# Patient Record
Sex: FEMALE | Race: WHITE | HISPANIC OR LATINO | Employment: OTHER | URBAN - METROPOLITAN AREA
[De-identification: names, ages, dates, MRNs, and addresses within clinical notes are randomized per-mention and may not be internally consistent; named-entity substitution may affect disease eponyms.]

---

## 2017-03-03 ENCOUNTER — HOSPITAL ENCOUNTER (OUTPATIENT)
Facility: MEDICAL CENTER | Age: 77
End: 2017-03-03
Attending: FAMILY MEDICINE
Payer: MEDICARE

## 2017-03-03 PROCEDURE — 81001 URINALYSIS AUTO W/SCOPE: CPT

## 2017-03-03 PROCEDURE — 87086 URINE CULTURE/COLONY COUNT: CPT

## 2017-03-04 LAB
APPEARANCE UR: CLEAR
BILIRUB UR QL STRIP.AUTO: NEGATIVE
COLOR UR AUTO: YELLOW
GLUCOSE UR STRIP.AUTO-MCNC: NEGATIVE MG/DL
KETONES UR STRIP.AUTO-MCNC: NEGATIVE MG/DL
LEUKOCYTE ESTERASE UR QL STRIP.AUTO: NEGATIVE
MICRO URNS: ABNORMAL
NITRITE UR QL STRIP.AUTO: NEGATIVE
PH UR: 6.5 [PH]
PROT UR QL STRIP: NEGATIVE MG/DL
RBC #/AREA URNS HPF: NORMAL /HPF
RBC UR QL AUTO: ABNORMAL
SP GR UR STRIP.AUTO: 1.01

## 2017-03-04 PROCEDURE — 81001 URINALYSIS AUTO W/SCOPE: CPT

## 2017-03-04 PROCEDURE — 87086 URINE CULTURE/COLONY COUNT: CPT

## 2017-03-06 LAB
BACTERIA UR CULT: NORMAL
SIGNIFICANT IND 70042: NORMAL
SOURCE SOURCE: NORMAL

## 2017-03-21 ENCOUNTER — HOSPITAL ENCOUNTER (OUTPATIENT)
Dept: LAB | Facility: MEDICAL CENTER | Age: 77
End: 2017-03-21
Attending: FAMILY MEDICINE
Payer: MEDICARE

## 2017-03-21 LAB
ANION GAP SERPL CALC-SCNC: 6 MMOL/L (ref 0–11.9)
BUN SERPL-MCNC: 19 MG/DL (ref 8–22)
CALCIUM SERPL-MCNC: 9.6 MG/DL (ref 8.5–10.5)
CHLORIDE SERPL-SCNC: 104 MMOL/L (ref 96–112)
CO2 SERPL-SCNC: 26 MMOL/L (ref 20–33)
CREAT SERPL-MCNC: 0.81 MG/DL (ref 0.5–1.4)
GLUCOSE SERPL-MCNC: 94 MG/DL (ref 65–99)
POTASSIUM SERPL-SCNC: 4.3 MMOL/L (ref 3.6–5.5)
SODIUM SERPL-SCNC: 136 MMOL/L (ref 135–145)

## 2017-03-21 PROCEDURE — 36415 COLL VENOUS BLD VENIPUNCTURE: CPT

## 2017-03-21 PROCEDURE — 80048 BASIC METABOLIC PNL TOTAL CA: CPT

## 2017-04-06 ENCOUNTER — HOSPITAL ENCOUNTER (OUTPATIENT)
Dept: RADIOLOGY | Facility: MEDICAL CENTER | Age: 77
End: 2017-04-06
Attending: FAMILY MEDICINE
Payer: MEDICARE

## 2017-04-06 DIAGNOSIS — G45.9 TRANSIENT CEREBRAL ISCHEMIA, UNSPECIFIED TYPE: ICD-10-CM

## 2017-04-06 PROCEDURE — 700117 HCHG RX CONTRAST REV CODE 255: Performed by: FAMILY MEDICINE

## 2017-04-06 PROCEDURE — A9579 GAD-BASE MR CONTRAST NOS,1ML: HCPCS | Performed by: FAMILY MEDICINE

## 2017-04-06 PROCEDURE — 70553 MRI BRAIN STEM W/O & W/DYE: CPT

## 2017-04-06 RX ADMIN — GADODIAMIDE 15 ML: 287 INJECTION INTRAVENOUS at 15:58

## 2017-10-17 ENCOUNTER — HOSPITAL ENCOUNTER (OUTPATIENT)
Dept: LAB | Facility: MEDICAL CENTER | Age: 77
End: 2017-10-17
Attending: FAMILY MEDICINE
Payer: MEDICARE

## 2017-10-17 LAB
ALBUMIN SERPL BCP-MCNC: 3.8 G/DL (ref 3.2–4.9)
ALBUMIN/GLOB SERPL: 1.1 G/DL
ALP SERPL-CCNC: 90 U/L (ref 30–99)
ALT SERPL-CCNC: 13 U/L (ref 2–50)
ANION GAP SERPL CALC-SCNC: 9 MMOL/L (ref 0–11.9)
AST SERPL-CCNC: 18 U/L (ref 12–45)
BASOPHILS # BLD AUTO: 1.5 % (ref 0–1.8)
BASOPHILS # BLD: 0.13 K/UL (ref 0–0.12)
BILIRUB SERPL-MCNC: 0.2 MG/DL (ref 0.1–1.5)
BUN SERPL-MCNC: 19 MG/DL (ref 8–22)
CALCIUM SERPL-MCNC: 9.5 MG/DL (ref 8.5–10.5)
CHLORIDE SERPL-SCNC: 107 MMOL/L (ref 96–112)
CO2 SERPL-SCNC: 23 MMOL/L (ref 20–33)
CREAT SERPL-MCNC: 0.76 MG/DL (ref 0.5–1.4)
CRP SERPL HS-MCNC: 12.1 MG/L (ref 0–7.5)
EOSINOPHIL # BLD AUTO: 0.15 K/UL (ref 0–0.51)
EOSINOPHIL NFR BLD: 1.7 % (ref 0–6.9)
ERYTHROCYTE [DISTWIDTH] IN BLOOD BY AUTOMATED COUNT: 42.5 FL (ref 35.9–50)
ERYTHROCYTE [SEDIMENTATION RATE] IN BLOOD BY WESTERGREN METHOD: 43 MM/HOUR (ref 0–30)
GFR SERPL CREATININE-BSD FRML MDRD: >60 ML/MIN/1.73 M 2
GLOBULIN SER CALC-MCNC: 3.5 G/DL (ref 1.9–3.5)
GLUCOSE SERPL-MCNC: 105 MG/DL (ref 65–99)
HCT VFR BLD AUTO: 38 % (ref 37–47)
HGB BLD-MCNC: 12.3 G/DL (ref 12–16)
IMM GRANULOCYTES # BLD AUTO: 0.02 K/UL (ref 0–0.11)
IMM GRANULOCYTES NFR BLD AUTO: 0.2 % (ref 0–0.9)
LYMPHOCYTES # BLD AUTO: 2.59 K/UL (ref 1–4.8)
LYMPHOCYTES NFR BLD: 29.2 % (ref 22–41)
MCH RBC QN AUTO: 27.5 PG (ref 27–33)
MCHC RBC AUTO-ENTMCNC: 32.4 G/DL (ref 33.6–35)
MCV RBC AUTO: 85 FL (ref 81.4–97.8)
MONOCYTES # BLD AUTO: 0.35 K/UL (ref 0–0.85)
MONOCYTES NFR BLD AUTO: 4 % (ref 0–13.4)
NEUTROPHILS # BLD AUTO: 5.62 K/UL (ref 2–7.15)
NEUTROPHILS NFR BLD: 63.4 % (ref 44–72)
NRBC # BLD AUTO: 0.02 K/UL
NRBC BLD AUTO-RTO: 0.2 /100 WBC
PLATELET # BLD AUTO: 144 K/UL (ref 164–446)
PMV BLD AUTO: 12 FL (ref 9–12.9)
POTASSIUM SERPL-SCNC: 4.3 MMOL/L (ref 3.6–5.5)
PROT SERPL-MCNC: 7.3 G/DL (ref 6–8.2)
RBC # BLD AUTO: 4.47 M/UL (ref 4.2–5.4)
RHEUMATOID FACT SER IA-ACNC: <10 IU/ML (ref 0–14)
SODIUM SERPL-SCNC: 139 MMOL/L (ref 135–145)
WBC # BLD AUTO: 8.9 K/UL (ref 4.8–10.8)

## 2017-10-17 PROCEDURE — 86038 ANTINUCLEAR ANTIBODIES: CPT

## 2017-10-17 PROCEDURE — 86431 RHEUMATOID FACTOR QUANT: CPT

## 2017-10-17 PROCEDURE — 85652 RBC SED RATE AUTOMATED: CPT

## 2017-10-17 PROCEDURE — 85025 COMPLETE CBC W/AUTO DIFF WBC: CPT

## 2017-10-17 PROCEDURE — 86200 CCP ANTIBODY: CPT

## 2017-10-17 PROCEDURE — 86141 C-REACTIVE PROTEIN HS: CPT

## 2017-10-17 PROCEDURE — 36415 COLL VENOUS BLD VENIPUNCTURE: CPT

## 2017-10-17 PROCEDURE — 80053 COMPREHEN METABOLIC PANEL: CPT

## 2017-10-19 LAB
CCP IGG SERPL-ACNC: 42 UNITS (ref 0–19)
NUCLEAR IGG SER QL IA: NORMAL

## 2017-11-16 ENCOUNTER — HOSPITAL ENCOUNTER (OUTPATIENT)
Dept: RADIOLOGY | Facility: MEDICAL CENTER | Age: 77
End: 2017-11-16
Attending: FAMILY MEDICINE
Payer: MEDICARE

## 2017-11-16 DIAGNOSIS — Z12.31 VISIT FOR SCREENING MAMMOGRAM: ICD-10-CM

## 2017-11-16 PROCEDURE — G0202 SCR MAMMO BI INCL CAD: HCPCS

## 2018-06-12 ENCOUNTER — HOSPITAL ENCOUNTER (OUTPATIENT)
Dept: LAB | Facility: MEDICAL CENTER | Age: 78
End: 2018-06-12
Attending: FAMILY MEDICINE
Payer: MEDICARE

## 2018-06-12 LAB
25(OH)D3 SERPL-MCNC: 85 NG/ML (ref 30–100)
ALBUMIN SERPL BCP-MCNC: 4.1 G/DL (ref 3.2–4.9)
ALBUMIN/GLOB SERPL: 1.1 G/DL
ALP SERPL-CCNC: 97 U/L (ref 30–99)
ALT SERPL-CCNC: 21 U/L (ref 2–50)
ANION GAP SERPL CALC-SCNC: 8 MMOL/L (ref 0–11.9)
AST SERPL-CCNC: 21 U/L (ref 12–45)
BASOPHILS # BLD AUTO: 1.1 % (ref 0–1.8)
BASOPHILS # BLD: 0.07 K/UL (ref 0–0.12)
BILIRUB SERPL-MCNC: 0.5 MG/DL (ref 0.1–1.5)
BUN SERPL-MCNC: 21 MG/DL (ref 8–22)
CALCIUM SERPL-MCNC: 9.4 MG/DL (ref 8.5–10.5)
CHLORIDE SERPL-SCNC: 106 MMOL/L (ref 96–112)
CHOLEST SERPL-MCNC: 144 MG/DL (ref 100–199)
CO2 SERPL-SCNC: 24 MMOL/L (ref 20–33)
CREAT SERPL-MCNC: 1.06 MG/DL (ref 0.5–1.4)
EOSINOPHIL # BLD AUTO: 0.13 K/UL (ref 0–0.51)
EOSINOPHIL NFR BLD: 2 % (ref 0–6.9)
ERYTHROCYTE [DISTWIDTH] IN BLOOD BY AUTOMATED COUNT: 44.6 FL (ref 35.9–50)
GLOBULIN SER CALC-MCNC: 3.6 G/DL (ref 1.9–3.5)
GLUCOSE SERPL-MCNC: 119 MG/DL (ref 65–99)
HCT VFR BLD AUTO: 43.4 % (ref 37–47)
HDLC SERPL-MCNC: 51 MG/DL
HGB BLD-MCNC: 13.4 G/DL (ref 12–16)
IMM GRANULOCYTES # BLD AUTO: 0.01 K/UL (ref 0–0.11)
IMM GRANULOCYTES NFR BLD AUTO: 0.2 % (ref 0–0.9)
LDLC SERPL CALC-MCNC: 70 MG/DL
LYMPHOCYTES # BLD AUTO: 1.58 K/UL (ref 1–4.8)
LYMPHOCYTES NFR BLD: 24.1 % (ref 22–41)
MCH RBC QN AUTO: 27.3 PG (ref 27–33)
MCHC RBC AUTO-ENTMCNC: 30.9 G/DL (ref 33.6–35)
MCV RBC AUTO: 88.4 FL (ref 81.4–97.8)
MONOCYTES # BLD AUTO: 0.27 K/UL (ref 0–0.85)
MONOCYTES NFR BLD AUTO: 4.1 % (ref 0–13.4)
NEUTROPHILS # BLD AUTO: 4.5 K/UL (ref 2–7.15)
NEUTROPHILS NFR BLD: 68.5 % (ref 44–72)
NRBC # BLD AUTO: 0 K/UL
NRBC BLD-RTO: 0 /100 WBC
PLATELET # BLD AUTO: 126 K/UL (ref 164–446)
PMV BLD AUTO: 11.9 FL (ref 9–12.9)
POTASSIUM SERPL-SCNC: 4.1 MMOL/L (ref 3.6–5.5)
PROT SERPL-MCNC: 7.7 G/DL (ref 6–8.2)
RBC # BLD AUTO: 4.91 M/UL (ref 4.2–5.4)
SODIUM SERPL-SCNC: 138 MMOL/L (ref 135–145)
TRIGL SERPL-MCNC: 113 MG/DL (ref 0–149)
TSH SERPL DL<=0.005 MIU/L-ACNC: 2.61 UIU/ML (ref 0.38–5.33)
WBC # BLD AUTO: 6.6 K/UL (ref 4.8–10.8)

## 2018-06-12 PROCEDURE — 82306 VITAMIN D 25 HYDROXY: CPT

## 2018-06-12 PROCEDURE — 84443 ASSAY THYROID STIM HORMONE: CPT

## 2018-06-12 PROCEDURE — 36415 COLL VENOUS BLD VENIPUNCTURE: CPT

## 2018-06-12 PROCEDURE — 85025 COMPLETE CBC W/AUTO DIFF WBC: CPT

## 2018-06-12 PROCEDURE — 80061 LIPID PANEL: CPT

## 2018-06-12 PROCEDURE — 80053 COMPREHEN METABOLIC PANEL: CPT

## 2019-03-28 ENCOUNTER — APPOINTMENT (OUTPATIENT)
Dept: RADIOLOGY | Facility: MEDICAL CENTER | Age: 79
DRG: 388 | End: 2019-03-28
Attending: HOSPITALIST
Payer: MEDICARE

## 2019-03-28 ENCOUNTER — APPOINTMENT (OUTPATIENT)
Dept: RADIOLOGY | Facility: MEDICAL CENTER | Age: 79
DRG: 388 | End: 2019-03-28
Attending: EMERGENCY MEDICINE
Payer: MEDICARE

## 2019-03-28 ENCOUNTER — HOSPITAL ENCOUNTER (INPATIENT)
Facility: MEDICAL CENTER | Age: 79
LOS: 4 days | DRG: 388 | End: 2019-04-01
Attending: EMERGENCY MEDICINE | Admitting: HOSPITALIST
Payer: MEDICARE

## 2019-03-28 DIAGNOSIS — R54 AGE-RELATED PHYSICAL DEBILITY: ICD-10-CM

## 2019-03-28 DIAGNOSIS — E87.20 LACTIC ACIDOSIS: ICD-10-CM

## 2019-03-28 DIAGNOSIS — R07.9 ACUTE CHEST PAIN: ICD-10-CM

## 2019-03-28 DIAGNOSIS — D72.829 LEUKOCYTOSIS, UNSPECIFIED TYPE: ICD-10-CM

## 2019-03-28 DIAGNOSIS — E86.0 DEHYDRATION: ICD-10-CM

## 2019-03-28 DIAGNOSIS — K56.7 ILEUS (HCC): ICD-10-CM

## 2019-03-28 DIAGNOSIS — R10.9 ACUTE ABDOMINAL PAIN: ICD-10-CM

## 2019-03-28 DIAGNOSIS — I87.8 POOR VENOUS ACCESS: ICD-10-CM

## 2019-03-28 DIAGNOSIS — Z86.73 HISTORY OF STROKE: ICD-10-CM

## 2019-03-28 DIAGNOSIS — K85.90 ACUTE PANCREATITIS, UNSPECIFIED COMPLICATION STATUS, UNSPECIFIED PANCREATITIS TYPE: ICD-10-CM

## 2019-03-28 DIAGNOSIS — I10 ESSENTIAL HYPERTENSION: ICD-10-CM

## 2019-03-28 DIAGNOSIS — F41.9 ANXIETY: ICD-10-CM

## 2019-03-28 DIAGNOSIS — M79.7 FIBROMYALGIA: ICD-10-CM

## 2019-03-28 PROBLEM — R65.10 SIRS (SYSTEMIC INFLAMMATORY RESPONSE SYNDROME) (HCC): Status: ACTIVE | Noted: 2019-03-28

## 2019-03-28 PROBLEM — R79.9 ELEVATED BUN: Status: ACTIVE | Noted: 2019-03-28

## 2019-03-28 PROBLEM — K56.609 SMALL BOWEL OBSTRUCTION (HCC): Status: ACTIVE | Noted: 2019-03-28

## 2019-03-28 LAB
ALBUMIN SERPL BCP-MCNC: 4.8 G/DL (ref 3.2–4.9)
ALBUMIN/GLOB SERPL: 1.3 G/DL
ALP SERPL-CCNC: 105 U/L (ref 30–99)
ALT SERPL-CCNC: 26 U/L (ref 2–50)
ANION GAP SERPL CALC-SCNC: 11 MMOL/L (ref 0–11.9)
APTT PPP: 21.3 SEC (ref 24.7–36)
AST SERPL-CCNC: 37 U/L (ref 12–45)
BASOPHILS # BLD AUTO: 0.3 % (ref 0–1.8)
BASOPHILS # BLD: 0.07 K/UL (ref 0–0.12)
BILIRUB SERPL-MCNC: 0.3 MG/DL (ref 0.1–1.5)
BNP SERPL-MCNC: 11 PG/ML (ref 0–100)
BUN SERPL-MCNC: 24 MG/DL (ref 8–22)
CALCIUM SERPL-MCNC: 9.3 MG/DL (ref 8.5–10.5)
CHLORIDE SERPL-SCNC: 106 MMOL/L (ref 96–112)
CHOLEST SERPL-MCNC: 133 MG/DL (ref 100–199)
CO2 SERPL-SCNC: 24 MMOL/L (ref 20–33)
CREAT SERPL-MCNC: 1.15 MG/DL (ref 0.5–1.4)
EKG IMPRESSION: NORMAL
EOSINOPHIL # BLD AUTO: 0.02 K/UL (ref 0–0.51)
EOSINOPHIL NFR BLD: 0.1 % (ref 0–6.9)
ERYTHROCYTE [DISTWIDTH] IN BLOOD BY AUTOMATED COUNT: 46.2 FL (ref 35.9–50)
GLOBULIN SER CALC-MCNC: 3.6 G/DL (ref 1.9–3.5)
GLUCOSE SERPL-MCNC: 182 MG/DL (ref 65–99)
HCG SERPL QL: NEGATIVE
HCT VFR BLD AUTO: 47.7 % (ref 37–47)
HDLC SERPL-MCNC: 48 MG/DL
HGB BLD-MCNC: 15.3 G/DL (ref 12–16)
IMM GRANULOCYTES # BLD AUTO: 0.17 K/UL (ref 0–0.11)
IMM GRANULOCYTES NFR BLD AUTO: 0.8 % (ref 0–0.9)
INR PPP: 0.97 (ref 0.87–1.13)
LACTATE BLD-SCNC: 1.9 MMOL/L (ref 0.5–2)
LACTATE BLD-SCNC: 3.1 MMOL/L (ref 0.5–2)
LACTATE BLD-SCNC: 3.3 MMOL/L (ref 0.5–2)
LDLC SERPL CALC-MCNC: 67 MG/DL
LIPASE SERPL-CCNC: 4447 U/L (ref 11–82)
LYMPHOCYTES # BLD AUTO: 1.78 K/UL (ref 1–4.8)
LYMPHOCYTES NFR BLD: 8.8 % (ref 22–41)
MCH RBC QN AUTO: 29.1 PG (ref 27–33)
MCHC RBC AUTO-ENTMCNC: 32.1 G/DL (ref 33.6–35)
MCV RBC AUTO: 90.7 FL (ref 81.4–97.8)
MONOCYTES # BLD AUTO: 0.85 K/UL (ref 0–0.85)
MONOCYTES NFR BLD AUTO: 4.2 % (ref 0–13.4)
NEUTROPHILS # BLD AUTO: 17.23 K/UL (ref 2–7.15)
NEUTROPHILS NFR BLD: 85.8 % (ref 44–72)
NRBC # BLD AUTO: 0 K/UL
NRBC BLD-RTO: 0 /100 WBC
PLATELET # BLD AUTO: 209 K/UL (ref 164–446)
PMV BLD AUTO: 11 FL (ref 9–12.9)
POTASSIUM SERPL-SCNC: 4.1 MMOL/L (ref 3.6–5.5)
PROT SERPL-MCNC: 8.4 G/DL (ref 6–8.2)
PROTHROMBIN TIME: 13 SEC (ref 12–14.6)
RBC # BLD AUTO: 5.26 M/UL (ref 4.2–5.4)
SODIUM SERPL-SCNC: 141 MMOL/L (ref 135–145)
TRIGL SERPL-MCNC: 89 MG/DL (ref 0–149)
TROPONIN I SERPL-MCNC: <0.01 NG/ML (ref 0–0.04)
WBC # BLD AUTO: 20.1 K/UL (ref 4.8–10.8)

## 2019-03-28 PROCEDURE — 85025 COMPLETE CBC W/AUTO DIFF WBC: CPT

## 2019-03-28 PROCEDURE — 99223 1ST HOSP IP/OBS HIGH 75: CPT | Performed by: HOSPITALIST

## 2019-03-28 PROCEDURE — 304538 HCHG NG TUBE

## 2019-03-28 PROCEDURE — 80053 COMPREHEN METABOLIC PANEL: CPT

## 2019-03-28 PROCEDURE — 83605 ASSAY OF LACTIC ACID: CPT

## 2019-03-28 PROCEDURE — 770006 HCHG ROOM/CARE - MED/SURG/GYN SEMI*

## 2019-03-28 PROCEDURE — 76705 ECHO EXAM OF ABDOMEN: CPT

## 2019-03-28 PROCEDURE — 700111 HCHG RX REV CODE 636 W/ 250 OVERRIDE (IP): Performed by: EMERGENCY MEDICINE

## 2019-03-28 PROCEDURE — 70450 CT HEAD/BRAIN W/O DYE: CPT

## 2019-03-28 PROCEDURE — 700105 HCHG RX REV CODE 258: Performed by: EMERGENCY MEDICINE

## 2019-03-28 PROCEDURE — 84703 CHORIONIC GONADOTROPIN ASSAY: CPT

## 2019-03-28 PROCEDURE — 96376 TX/PRO/DX INJ SAME DRUG ADON: CPT

## 2019-03-28 PROCEDURE — 36556 INSERT NON-TUNNEL CV CATH: CPT

## 2019-03-28 PROCEDURE — 96368 THER/DIAG CONCURRENT INF: CPT

## 2019-03-28 PROCEDURE — B548ZZA ULTRASONOGRAPHY OF SUPERIOR VENA CAVA, GUIDANCE: ICD-10-PCS | Performed by: EMERGENCY MEDICINE

## 2019-03-28 PROCEDURE — 02HV33Z INSERTION OF INFUSION DEVICE INTO SUPERIOR VENA CAVA, PERCUTANEOUS APPROACH: ICD-10-PCS | Performed by: EMERGENCY MEDICINE

## 2019-03-28 PROCEDURE — 700111 HCHG RX REV CODE 636 W/ 250 OVERRIDE (IP): Performed by: HOSPITALIST

## 2019-03-28 PROCEDURE — 700111 HCHG RX REV CODE 636 W/ 250 OVERRIDE (IP): Performed by: NURSE PRACTITIONER

## 2019-03-28 PROCEDURE — 99285 EMERGENCY DEPT VISIT HI MDM: CPT

## 2019-03-28 PROCEDURE — 96365 THER/PROPH/DIAG IV INF INIT: CPT

## 2019-03-28 PROCEDURE — 71045 X-RAY EXAM CHEST 1 VIEW: CPT

## 2019-03-28 PROCEDURE — 71275 CT ANGIOGRAPHY CHEST: CPT

## 2019-03-28 PROCEDURE — 84484 ASSAY OF TROPONIN QUANT: CPT

## 2019-03-28 PROCEDURE — C1751 CATH, INF, PER/CENT/MIDLINE: HCPCS

## 2019-03-28 PROCEDURE — 700101 HCHG RX REV CODE 250: Performed by: EMERGENCY MEDICINE

## 2019-03-28 PROCEDURE — 93005 ELECTROCARDIOGRAM TRACING: CPT | Performed by: EMERGENCY MEDICINE

## 2019-03-28 PROCEDURE — 83690 ASSAY OF LIPASE: CPT

## 2019-03-28 PROCEDURE — 700105 HCHG RX REV CODE 258: Performed by: HOSPITALIST

## 2019-03-28 PROCEDURE — 83880 ASSAY OF NATRIURETIC PEPTIDE: CPT

## 2019-03-28 PROCEDURE — 96375 TX/PRO/DX INJ NEW DRUG ADDON: CPT

## 2019-03-28 PROCEDURE — 96372 THER/PROPH/DIAG INJ SC/IM: CPT

## 2019-03-28 PROCEDURE — 85730 THROMBOPLASTIN TIME PARTIAL: CPT

## 2019-03-28 PROCEDURE — C9113 INJ PANTOPRAZOLE SODIUM, VIA: HCPCS | Performed by: NURSE PRACTITIONER

## 2019-03-28 PROCEDURE — 87040 BLOOD CULTURE FOR BACTERIA: CPT

## 2019-03-28 PROCEDURE — 85610 PROTHROMBIN TIME: CPT

## 2019-03-28 PROCEDURE — 80061 LIPID PANEL: CPT

## 2019-03-28 PROCEDURE — 700117 HCHG RX CONTRAST REV CODE 255: Performed by: EMERGENCY MEDICINE

## 2019-03-28 RX ORDER — LOTEPREDNOL ETABONATE 5 MG/G
1 GEL OPHTHALMIC 4 TIMES DAILY
COMMUNITY
Start: 2019-03-04 | End: 2019-03-28 | Stop reason: CLARIF

## 2019-03-28 RX ORDER — ACETAMINOPHEN AND CODEINE PHOSPHATE 120; 12 MG/5ML; MG/5ML
30 SOLUTION ORAL
Status: DISCONTINUED | OUTPATIENT
Start: 2019-03-28 | End: 2019-03-28

## 2019-03-28 RX ORDER — PROCHLORPERAZINE MALEATE 10 MG
10 TABLET ORAL EVERY 6 HOURS PRN
Status: DISCONTINUED | OUTPATIENT
Start: 2019-03-28 | End: 2019-04-01 | Stop reason: HOSPADM

## 2019-03-28 RX ORDER — SODIUM CHLORIDE 9 MG/ML
INJECTION, SOLUTION INTRAVENOUS CONTINUOUS
Status: DISCONTINUED | OUTPATIENT
Start: 2019-03-28 | End: 2019-03-28

## 2019-03-28 RX ORDER — ONDANSETRON 4 MG/1
4 TABLET, ORALLY DISINTEGRATING ORAL EVERY 8 HOURS PRN
COMMUNITY
End: 2019-04-02

## 2019-03-28 RX ORDER — PANTOPRAZOLE SODIUM 40 MG/10ML
40 INJECTION, POWDER, LYOPHILIZED, FOR SOLUTION INTRAVENOUS DAILY
Status: DISCONTINUED | OUTPATIENT
Start: 2019-03-28 | End: 2019-03-30

## 2019-03-28 RX ORDER — ONDANSETRON 2 MG/ML
4 INJECTION INTRAMUSCULAR; INTRAVENOUS EVERY 4 HOURS PRN
Status: DISCONTINUED | OUTPATIENT
Start: 2019-03-28 | End: 2019-04-01 | Stop reason: HOSPADM

## 2019-03-28 RX ORDER — HEPARIN SODIUM 5000 [USP'U]/ML
5000 INJECTION, SOLUTION INTRAVENOUS; SUBCUTANEOUS EVERY 8 HOURS
Status: DISCONTINUED | OUTPATIENT
Start: 2019-03-28 | End: 2019-04-01 | Stop reason: HOSPADM

## 2019-03-28 RX ORDER — ONDANSETRON 2 MG/ML
4 INJECTION INTRAMUSCULAR; INTRAVENOUS ONCE
Status: COMPLETED | OUTPATIENT
Start: 2019-03-28 | End: 2019-03-28

## 2019-03-28 RX ORDER — CLONIDINE 0.1 MG/24H
1 PATCH, EXTENDED RELEASE TRANSDERMAL
Status: DISCONTINUED | OUTPATIENT
Start: 2019-03-28 | End: 2019-03-28

## 2019-03-28 RX ORDER — MORPHINE SULFATE 4 MG/ML
4 INJECTION, SOLUTION INTRAMUSCULAR; INTRAVENOUS
Status: DISCONTINUED | OUTPATIENT
Start: 2019-03-28 | End: 2019-03-31

## 2019-03-28 RX ORDER — BUSPIRONE HYDROCHLORIDE 10 MG/1
30 TABLET ORAL 2 TIMES DAILY
Status: DISCONTINUED | OUTPATIENT
Start: 2019-03-28 | End: 2019-03-28

## 2019-03-28 RX ORDER — SODIUM CHLORIDE 9 MG/ML
1000 INJECTION, SOLUTION INTRAVENOUS ONCE
Status: COMPLETED | OUTPATIENT
Start: 2019-03-28 | End: 2019-03-28

## 2019-03-28 RX ORDER — POLYETHYLENE GLYCOL 3350 17 G/17G
1 POWDER, FOR SOLUTION ORAL
Status: DISCONTINUED | OUTPATIENT
Start: 2019-03-28 | End: 2019-04-01 | Stop reason: HOSPADM

## 2019-03-28 RX ORDER — SODIUM CHLORIDE, SODIUM LACTATE, POTASSIUM CHLORIDE, CALCIUM CHLORIDE 600; 310; 30; 20 MG/100ML; MG/100ML; MG/100ML; MG/100ML
INJECTION, SOLUTION INTRAVENOUS CONTINUOUS
Status: DISCONTINUED | OUTPATIENT
Start: 2019-03-28 | End: 2019-03-31

## 2019-03-28 RX ORDER — ONDANSETRON 4 MG/1
4 TABLET, ORALLY DISINTEGRATING ORAL EVERY 4 HOURS PRN
Status: DISCONTINUED | OUTPATIENT
Start: 2019-03-28 | End: 2019-04-01 | Stop reason: HOSPADM

## 2019-03-28 RX ORDER — OMEPRAZOLE 20 MG/1
20 CAPSULE, DELAYED RELEASE ORAL DAILY
COMMUNITY
End: 2019-04-02

## 2019-03-28 RX ORDER — TRIAMCINOLONE ACETONIDE 1 MG/G
1 CREAM TOPICAL 3 TIMES DAILY PRN
COMMUNITY
End: 2019-03-28 | Stop reason: CLARIF

## 2019-03-28 RX ORDER — SODIUM CHLORIDE 9 MG/ML
1000 INJECTION, SOLUTION INTRAVENOUS ONCE
Status: DISPENSED | OUTPATIENT
Start: 2019-03-28 | End: 2019-03-29

## 2019-03-28 RX ORDER — MIDAZOLAM HYDROCHLORIDE 1 MG/ML
INJECTION INTRAMUSCULAR; INTRAVENOUS
Status: DISPENSED
Start: 2019-03-28 | End: 2019-03-28

## 2019-03-28 RX ORDER — GABAPENTIN 100 MG/1
200 CAPSULE ORAL 2 TIMES DAILY
Status: DISCONTINUED | OUTPATIENT
Start: 2019-03-28 | End: 2019-04-01 | Stop reason: HOSPADM

## 2019-03-28 RX ORDER — ENALAPRILAT 1.25 MG/ML
1.25 INJECTION INTRAVENOUS EVERY 6 HOURS PRN
Status: DISCONTINUED | OUTPATIENT
Start: 2019-03-28 | End: 2019-04-01 | Stop reason: HOSPADM

## 2019-03-28 RX ORDER — LORAZEPAM 1 MG/1
0.5 TABLET ORAL EVERY 4 HOURS PRN
Status: DISCONTINUED | OUTPATIENT
Start: 2019-03-28 | End: 2019-03-30

## 2019-03-28 RX ORDER — BISACODYL 10 MG
10 SUPPOSITORY, RECTAL RECTAL
Status: DISCONTINUED | OUTPATIENT
Start: 2019-03-28 | End: 2019-04-01 | Stop reason: HOSPADM

## 2019-03-28 RX ORDER — LOTEPREDNOL ETABONATE 5 MG/G
1 GEL OPHTHALMIC 3 TIMES DAILY
COMMUNITY
End: 2019-04-02

## 2019-03-28 RX ORDER — OXYCODONE HYDROCHLORIDE 10 MG/1
10 TABLET ORAL
Status: DISCONTINUED | OUTPATIENT
Start: 2019-03-28 | End: 2019-03-31

## 2019-03-28 RX ORDER — HYDROXYZINE HYDROCHLORIDE 25 MG/1
25 TABLET, FILM COATED ORAL
Status: ON HOLD | COMMUNITY
End: 2019-04-01

## 2019-03-28 RX ORDER — OXYCODONE HYDROCHLORIDE 5 MG/1
5 TABLET ORAL
Status: DISCONTINUED | OUTPATIENT
Start: 2019-03-28 | End: 2019-03-31

## 2019-03-28 RX ORDER — AMOXICILLIN 250 MG
2 CAPSULE ORAL 2 TIMES DAILY
Status: DISCONTINUED | OUTPATIENT
Start: 2019-03-28 | End: 2019-04-01 | Stop reason: HOSPADM

## 2019-03-28 RX ORDER — BUSPIRONE HYDROCHLORIDE 10 MG/1
15 TABLET ORAL 3 TIMES DAILY
Status: DISCONTINUED | OUTPATIENT
Start: 2019-03-28 | End: 2019-04-01 | Stop reason: HOSPADM

## 2019-03-28 RX ORDER — LABETALOL HYDROCHLORIDE 5 MG/ML
10 INJECTION, SOLUTION INTRAVENOUS EVERY 4 HOURS PRN
Status: DISCONTINUED | OUTPATIENT
Start: 2019-03-28 | End: 2019-04-01 | Stop reason: HOSPADM

## 2019-03-28 RX ORDER — ACETAMINOPHEN AND CODEINE PHOSPHATE 120; 12 MG/5ML; MG/5ML
30 SOLUTION ORAL NIGHTLY PRN
COMMUNITY
End: 2019-04-02

## 2019-03-28 RX ADMIN — ONDANSETRON 4 MG: 2 INJECTION INTRAMUSCULAR; INTRAVENOUS at 17:32

## 2019-03-28 RX ADMIN — MORPHINE SULFATE 4 MG: 4 INJECTION INTRAVENOUS at 09:14

## 2019-03-28 RX ADMIN — ONDANSETRON 4 MG: 2 INJECTION INTRAMUSCULAR; INTRAVENOUS at 09:18

## 2019-03-28 RX ADMIN — SODIUM CHLORIDE 1000 ML: 9 INJECTION, SOLUTION INTRAVENOUS at 05:35

## 2019-03-28 RX ADMIN — ONDANSETRON 4 MG: 2 INJECTION INTRAMUSCULAR; INTRAVENOUS at 05:23

## 2019-03-28 RX ADMIN — ENALAPRILAT 1.25 MG: 1.25 INJECTION INTRAVENOUS at 12:11

## 2019-03-28 RX ADMIN — SODIUM CHLORIDE, POTASSIUM CHLORIDE, SODIUM LACTATE AND CALCIUM CHLORIDE: 600; 310; 30; 20 INJECTION, SOLUTION INTRAVENOUS at 17:23

## 2019-03-28 RX ADMIN — FENTANYL CITRATE 50 MCG: 50 INJECTION INTRAMUSCULAR; INTRAVENOUS at 05:28

## 2019-03-28 RX ADMIN — SODIUM CHLORIDE 1000 ML: 9 INJECTION, SOLUTION INTRAVENOUS at 04:45

## 2019-03-28 RX ADMIN — MORPHINE SULFATE 4 MG: 4 INJECTION INTRAVENOUS at 18:21

## 2019-03-28 RX ADMIN — HEPARIN SODIUM 5000 UNITS: 5000 INJECTION, SOLUTION INTRAVENOUS; SUBCUTANEOUS at 21:47

## 2019-03-28 RX ADMIN — FENTANYL CITRATE 50 MCG: 50 INJECTION INTRAMUSCULAR; INTRAVENOUS at 07:57

## 2019-03-28 RX ADMIN — CEFTRIAXONE SODIUM 2 G: 2 INJECTION, POWDER, FOR SOLUTION INTRAMUSCULAR; INTRAVENOUS at 08:05

## 2019-03-28 RX ADMIN — METRONIDAZOLE 500 MG: 500 INJECTION, SOLUTION INTRAVENOUS at 08:07

## 2019-03-28 RX ADMIN — SODIUM CHLORIDE 150 ML: 9 INJECTION, SOLUTION INTRAVENOUS at 08:09

## 2019-03-28 RX ADMIN — MORPHINE SULFATE 4 MG: 4 INJECTION INTRAVENOUS at 21:48

## 2019-03-28 RX ADMIN — IOHEXOL 100 ML: 350 INJECTION, SOLUTION INTRAVENOUS at 04:49

## 2019-03-28 RX ADMIN — MORPHINE SULFATE 4 MG: 4 INJECTION INTRAVENOUS at 15:13

## 2019-03-28 RX ADMIN — MORPHINE SULFATE 4 MG: 4 INJECTION INTRAVENOUS at 12:11

## 2019-03-28 RX ADMIN — SODIUM CHLORIDE, POTASSIUM CHLORIDE, SODIUM LACTATE AND CALCIUM CHLORIDE 150 ML: 600; 310; 30; 20 INJECTION, SOLUTION INTRAVENOUS at 12:15

## 2019-03-28 RX ADMIN — PANTOPRAZOLE SODIUM 40 MG: 40 INJECTION, POWDER, LYOPHILIZED, FOR SOLUTION INTRAVENOUS at 21:49

## 2019-03-28 RX ADMIN — SODIUM CHLORIDE, POTASSIUM CHLORIDE, SODIUM LACTATE AND CALCIUM CHLORIDE: 600; 310; 30; 20 INJECTION, SOLUTION INTRAVENOUS at 22:22

## 2019-03-28 RX ADMIN — HEPARIN SODIUM 5000 UNITS: 5000 INJECTION, SOLUTION INTRAVENOUS; SUBCUTANEOUS at 15:36

## 2019-03-28 RX ADMIN — ONDANSETRON 4 MG: 2 INJECTION INTRAMUSCULAR; INTRAVENOUS at 21:48

## 2019-03-28 ASSESSMENT — COGNITIVE AND FUNCTIONAL STATUS - GENERAL
MOVING FROM LYING ON BACK TO SITTING ON SIDE OF FLAT BED: A LITTLE
CLIMB 3 TO 5 STEPS WITH RAILING: A LITTLE
WALKING IN HOSPITAL ROOM: A LITTLE
STANDING UP FROM CHAIR USING ARMS: A LITTLE
MOBILITY SCORE: 19
SUGGESTED CMS G CODE MODIFIER MOBILITY: CK
TURNING FROM BACK TO SIDE WHILE IN FLAT BAD: A LITTLE

## 2019-03-28 ASSESSMENT — PATIENT HEALTH QUESTIONNAIRE - PHQ9
2. FEELING DOWN, DEPRESSED, IRRITABLE, OR HOPELESS: NOT AT ALL
1. LITTLE INTEREST OR PLEASURE IN DOING THINGS: NOT AT ALL
SUM OF ALL RESPONSES TO PHQ9 QUESTIONS 1 AND 2: 0

## 2019-03-28 ASSESSMENT — ENCOUNTER SYMPTOMS
DOUBLE VISION: 0
BLURRED VISION: 0
ABDOMINAL PAIN: 1
BRUISES/BLEEDS EASILY: 0
COUGH: 0
DIZZINESS: 0
FOCAL WEAKNESS: 0
WEAKNESS: 1
NAUSEA: 1
HALLUCINATIONS: 0
DEPRESSION: 0
FLANK PAIN: 0
SHORTNESS OF BREATH: 0
EYE DISCHARGE: 0
HEARTBURN: 0
MYALGIAS: 0
PALPITATIONS: 0
CHILLS: 0
FEVER: 0
HEMOPTYSIS: 0
VOMITING: 1
SPEECH CHANGE: 0
SENSORY CHANGE: 0

## 2019-03-28 ASSESSMENT — LIFESTYLE VARIABLES
DO YOU DRINK ALCOHOL: NO
ALCOHOL_USE: NO
SUBSTANCE_ABUSE: 0

## 2019-03-28 ASSESSMENT — PAIN DESCRIPTION - DESCRIPTORS
DESCRIPTORS: SHARP
DESCRIPTORS: SHARP

## 2019-03-28 NOTE — ASSESSMENT & PLAN NOTE
Unclear etiology of pancreatitis   RUQ ultrasound OK  IVF aggressive   Anti emetics   Pain control   Advance diet as tolerataed  Improving

## 2019-03-28 NOTE — ASSESSMENT & PLAN NOTE
SBO VS ileus from pancreaitis   IVF, anti emetics   Dr. Gomez General surgery consulted   NGT unable to be placed at bedside, through IR or via endoscopy  Abd w +BS and improving clinically  GS recommends again tube given above  3/30 Advance to clears, mobilize patient, decr IVF  3/31 Advance to full if taking clears at lunch. Says she's not taking much PO because she dislikes the fluids here.

## 2019-03-28 NOTE — ED NOTES
Pt sitting at a  45 degree comfortably speaking with family, no needs at this time, bed in lowered position, side rails up, call light in reach

## 2019-03-28 NOTE — ED NOTES
Pt refused oxycodone stating she is allergic to medication, pt will wait for her 3hr dose of morphine due to increased pain

## 2019-03-28 NOTE — ED NOTES
Received report from ZULEMA Chou, taking over pt care, pt resting comfortably, bed in lowered position side rails up, son at bedside. MD advised the unsuccessful attempts at NG placement

## 2019-03-28 NOTE — PROGRESS NOTES
Attending Hospitalist today is Dr. Rodriguez starting at 0900. Please call this physician for orders, updates and questions. Thank you.

## 2019-03-28 NOTE — PROGRESS NOTES
Patient seen and examined in the emergency room.  Patient appears in mild pain with a distended abdomen that is diffusely tender to palpation.  She is mildly diaphoretic.  Blood pressure is poorly controlled.  NG tube unsuccessfully placed by bedside nursing after multiple attempts.  Attempting to orchestrate placement through fluoroscopy.  Added as needed blood pressure control and continuing IV LR for acute pancreatitis.  At present clear etiology noted.  Patient admitted after midnight please see the HPI for complete details from this morning.  Patient awaits a bed on the general surgical unit.

## 2019-03-28 NOTE — ED TRIAGE NOTES
Chief Complaint   Patient presents with   • Chest Pain   • Abdominal Pain       Pt BIB by EMS from home sudden onset of n/v/d at 2200 yesterday.  EMS reports giving 6.25mg phenegran in route, 50 of fentanyl at 0304, aspirin 324mg.     Patient demonstrates mottling of extremities upon arrival at ED. Abdomen distended,  firm and painful.    BP's equal bilaterally. Patient is A&Ox4.

## 2019-03-28 NOTE — ED PROVIDER NOTES
ED PROVIDER NOTE     Scribed for Guanako Clark M.D. by Claire Wasserman. 3/28/2019, 4:10 AM.    CHIEF COMPLAINT  Chief Complaint   Patient presents with   • Chest Pain   • Abdominal Pain     HPI    Primary care provider: William Coleman M.D.  Means of arrival: Ambulance  History obtained from: Patient, daughter  History limited by: Patient being poor historian    Luz Maria Saavedra is a 78 y.o. female with a history of seizures and a CVA in 2008 who presents to the ED for evaluation of acute left sided chest pain that radiates to upper back and diffuse abdominal pain at 2200 tonight after eating some ground meat. She endorses associated nausea and vomiting. No alleviating or exacerbating factors reported. Patient's daughter contacted EMS at that time. Patient additionally reports to have generalized weakness the past 3 months. The patient also endorses diarrhea and is concerned she may have an infection in her right eye, but has no associated eye redness at this time. She has prior surgical history of a cholecystectomy. The patient denies numbness or weakness or vision changes.  On arrival daughter reports the patient has been feeling unwell for the last 4-5 days.  No alleviating measures noted.  No aggravating factors noted.  Symptoms have been constant and worsening since 10 PM.  At worst her pain is severe, and she feels abdominal distention as well.    Further HPI unobtainable due to patient being a poor historian.    REVIEW OF SYSTEMS  Constitutional: Positive for generalized weakness, no fevers or chills.  Eyes: Negative for redness or vision changes or discharge.  Cardiovascular: Positive for chest pain.  No syncope.  Gastrointestinal: Positive for diffuse abdominal pain, nausea, vomiting, and diarrhea.  No black or bloody output.  Musculoskeletal: Positive for left upper back pain.  No extremity pain.  Skin: Negative for itching or rash.   Neurological: Negative for numbness or weakness.  Further ROS  "unobtainable secondary to patient being a poor historian.    PAST MEDICAL HISTORY   has a past medical history of Anxiety and depression; Hypertension; Renal disorder; and Stroke (HCC).    PAST FAMILY HISTORY  History reviewed. No pertinent family history.    SOCIAL HISTORY  Social History     Social History Main Topics   • Smoking status: Never Smoker   • Smokeless tobacco: Never Used   • Alcohol use No   • Drug use: Yes   • Sexual activity: None noted       SURGICAL HISTORY   has a past surgical history that includes pham by laparoscopy; neuroma excision (11/21/08); cataract phaco with iol (1/27/2009); cataract phaco with iol (2/10/2009); cholecystectomy; exploratory laparotomy (8/4/2012); and hiatal hernia repair.    CURRENT MEDICATIONS  Home Medications     Reviewed by Denise Bernal (Pharmacy Tech) on 03/28/19 at 1208  Med List Status: Complete   Medication Last Dose Status   aspirin EC (ECOTRIN) 81 MG Tablet Delayed Response 3/27/2019 Active   busPIRone (BUSPAR) 15 MG tablet 3/27/2019 Active   flurazepam (DALMANE) 30 MG capsule 3/26/2019 Active   hydrOXYzine HCl (ATARAX) 25 MG Tab 3/26/2019 Active   lisinopril (PRINIVIL) 10 MG TABS 3/27/2019 Active   Loteprednol Etabonate (LOTEMAX) 0.5 % Gel 3/27/2019 Active   omeprazole (PRILOSEC) 20 MG delayed-release capsule 3/27/2019 Active   ondansetron (ZOFRAN ODT) 4 MG TABLET DISPERSIBLE 3/27/2019 Active   vitamin D, Ergocalciferol, (DRISDOL) 07123 UNITS CAPS capsule 3/23/2019 Active                ALLERGIES  Allergies   Allergen Reactions   • Amitriptyline    • Ciprofloxacin    • Codeine    • Hydralazine      Face swelling   • Other Drug      Pt states she is allergic to many antibiotics but she does not remember what they are.   • Pcn [Penicillins]    • Potassium Shortness of Breath     \"I feel like I can't breath\".  Has tolerate IV KCl in past   • Xanax [Alprazolam]    • Zoloft    • Zyrtec [Cetirizine]        PHYSICAL EXAM  VITAL SIGNS: /54   Pulse " "79   Temp 36.6 °C (97.9 °F) (Temporal)   Resp 12   Ht 1.626 m (5' 4\")   Wt 81.2 kg (179 lb 0.2 oz)   SpO2 94%   BMI 30.73 kg/m²    Pulse ox interpretation: On room air, I interpret this pulse ox as normal.  Constitutional: Chronically ill appearing in moderate distress.   HEENT: Normocephalic, atraumatic. Posterior pharynx clear, mucous membranes very dry.  Eyes:  EOMI. Normal sclerae.  Normal conjunctivae.  Neck: Supple, nontender.  Chest/Pulmonary: Diminished at bases, no wheezes or rhonchi.  Cardiovascular: Regular rate and rhythm, no murmur.   Abdomen: Diffuse tenderness, slightly firm and distended with guarding present. No rebound or masses.  Back: No CVA or midline tenderness.   Musculoskeletal: No deformity or edema or tenderness to her extremities.  Neuro: No asymmetry. Sensation grossly intact. Moving all extremities  Psych: Very anxious but cooperative.  Skin: Mottled extremities, diaphoretic.    DIAGNOSTIC STUDIES / PROCEDURES    LABS & EKG  Results for orders placed or performed during the hospital encounter of 03/28/19   CBC with Differential   Result Value Ref Range    WBC 20.1 (H) 4.8 - 10.8 K/uL    RBC 5.26 4.20 - 5.40 M/uL    Hemoglobin 15.3 12.0 - 16.0 g/dL    Hematocrit 47.7 (H) 37.0 - 47.0 %    MCV 90.7 81.4 - 97.8 fL    MCH 29.1 27.0 - 33.0 pg    MCHC 32.1 (L) 33.6 - 35.0 g/dL    RDW 46.2 35.9 - 50.0 fL    Platelet Count 209 164 - 446 K/uL    MPV 11.0 9.0 - 12.9 fL    Neutrophils-Polys 85.80 (H) 44.00 - 72.00 %    Lymphocytes 8.80 (L) 22.00 - 41.00 %    Monocytes 4.20 0.00 - 13.40 %    Eosinophils 0.10 0.00 - 6.90 %    Basophils 0.30 0.00 - 1.80 %    Immature Granulocytes 0.80 0.00 - 0.90 %    Nucleated RBC 0.00 /100 WBC    Neutrophils (Absolute) 17.23 (H) 2.00 - 7.15 K/uL    Lymphs (Absolute) 1.78 1.00 - 4.80 K/uL    Monos (Absolute) 0.85 0.00 - 0.85 K/uL    Eos (Absolute) 0.02 0.00 - 0.51 K/uL    Baso (Absolute) 0.07 0.00 - 0.12 K/uL    Immature Granulocytes (abs) 0.17 (H) 0.00 - 0.11 " K/uL    NRBC (Absolute) 0.00 K/uL   Complete Metabolic Panel (CMP)   Result Value Ref Range    Sodium 141 135 - 145 mmol/L    Potassium 4.1 3.6 - 5.5 mmol/L    Chloride 106 96 - 112 mmol/L    Co2 24 20 - 33 mmol/L    Anion Gap 11.0 0.0 - 11.9    Glucose 182 (H) 65 - 99 mg/dL    Bun 24 (H) 8 - 22 mg/dL    Creatinine 1.15 0.50 - 1.40 mg/dL    Calcium 9.3 8.5 - 10.5 mg/dL    AST(SGOT) 37 12 - 45 U/L    ALT(SGPT) 26 2 - 50 U/L    Alkaline Phosphatase 105 (H) 30 - 99 U/L    Total Bilirubin 0.3 0.1 - 1.5 mg/dL    Albumin 4.8 3.2 - 4.9 g/dL    Total Protein 8.4 (H) 6.0 - 8.2 g/dL    Globulin 3.6 (H) 1.9 - 3.5 g/dL    A-G Ratio 1.3 g/dL   Troponin   Result Value Ref Range    Troponin I <0.01 0.00 - 0.04 ng/mL   LIPASE   Result Value Ref Range    Lipase 4447 (H) 11 - 82 U/L   BTYPE NATRIURETIC PEPTIDE   Result Value Ref Range    B Natriuretic Peptide 11 0 - 100 pg/mL   APTT (PTT)   Result Value Ref Range    APTT 21.3 (L) 24.7 - 36.0 sec   PROTHROMBIN TIME (INR)   Result Value Ref Range    PT 13.0 12.0 - 14.6 sec    INR 0.97 0.87 - 1.13   HCG QUAL SERUM   Result Value Ref Range    Beta-Hcg Qualitative Serum Negative Negative   LACTIC ACID   Result Value Ref Range    Lactic Acid 3.3 (H) 0.5 - 2.0 mmol/L   LACTIC ACID   Result Value Ref Range    Lactic Acid 1.9 0.5 - 2.0 mmol/L   ESTIMATED GFR   Result Value Ref Range    GFR If  55 (A) >60 mL/min/1.73 m 2    GFR If Non African American 45 (A) >60 mL/min/1.73 m 2   Lipid Profile   Result Value Ref Range    Cholesterol,Tot 133 100 - 199 mg/dL    Triglycerides 89 0 - 149 mg/dL    HDL 48 >=40 mg/dL    LDL 67 <100 mg/dL   LACTIC ACID   Result Value Ref Range    Lactic Acid 3.1 (H) 0.5 - 2.0 mmol/L   EKG   Result Value Ref Range    Report       [facility] Emergency Dept.    Test Date:  2019-03-28  Pt Name:    JOSE DEL CID                Department: ER  MRN:        [id number]                      Room:       T416  Gender:     Female                        Technician: [id number]  :        [id number]                   Requested By:ER TRIAGE PROTOCOL  Order #:    [id number]                    Reading MD: Guanako Clark MD    Measurements  Intervals                                Axis  Rate:       80                           P:          36  NJ:         160                          QRS:        13  QRSD:       84                           T:          -22  QT:         392  QTc:        453    Interpretive Statements  SINUS RHYTHM  ABNORMAL T, CONSIDER ISCHEMIA, INFERIOR LEADS  Compared to ECG 10/19/2014 13:12:02  Possible ischemia now present  T-wave abnormality still present  No STEMI  Electronically Signed On 3- 17:58:11 PDT by Guanako Clark MD         RADIOLOGY  DX-CHEST-PORTABLE (1 VIEW)   Final Result      Right central line projects over the superior cavoatrial junction. No pneumothorax.      Stable elevation of the right hemidiaphragm.      Bibasilar atelectasis.      Stable prominence of the cardiomediastinal silhouette.      Atherosclerotic plaque.         US-RUQ   Final Result         1. Limited study due to poor acoustic windows and excessive bowel gas.   2. Hepatic steatosis.   3. Cholecystectomy.      CT-CTA COMPLETE THORACOABDOMINAL AORTA   Final Result         1. No aortic aneurysm or dissection. No pulmonary embolus.   2. Findings suggestive of acute interstitial edematous pancreatitis. Correlate with lipase.   3. Near diffuse small bowel dilatation with nondilated terminal ileum. This may be due to low-grade obstruction, or due to ileus related to pancreatitis. Transition point is not identified.   4. Stable tiny pulmonary nodules, overall unchanged since  study, benign.      CT-HEAD W/O   Final Result      No noncontrast CT evidence of acute intracranial hemorrhage.      Stable nonspecific white matter changes and cerebral volume loss.        PROCEDURES  Central Line Placement Procedure Note  Indication: vascular access    Consent: The  patient provided verbal consent for this procedure.    Procedure: The patient was positioned appropriately and the skin over the right internal jugular vein was prepped with chlorhexidine, and sterile technique was used throughout the entire procedure.  Hands were washed prior to procedure, Sterile cap and goggles and mask and gown and gloves and draped ultrasound probe cover utilized.  Local anesthesia was obtained by infiltration using 1% Lidocaine without epinephrine.  Under ultrasound guidance a long 18-gauge Angiocath was inserted into the right internal jugular vein, the catheter was then inserted over the needle and venous return was noted.  A guidewire was inserted into the Angiocath.  Ultrasound confirmed guidewire in the right internal jugular vein.  A skin nick was made with an 11 blade scalpel.  Dilator passed over wire.  A triple lumen catheter was then inserted into the vessel over the guidewire using the Seldinger technique.  All ports showed good, free flowing blood return and were flushed with saline solution.  The catheter was then securely fastened to the skin with sutures and covered with a sterile dressing after placement of Biopatch.  A post procedure X-ray was ordered and showed good line position and no evidence of pneumothorax or other complication.    The patient tolerated the procedure well.  EBL less than 5 ml.    Complications: None      COURSE & MEDICAL DECISION MAKING    This is a 78 y.o. female who presents with abdominal pain and vomiting and chest pain and back pain, significantly worse since 10 PM but potentially present for several days.    Differential Diagnosis includes but is not limited to:  Aortic dissection, ACS, PE, AAA, food borne illness    ED Course:  4:10 AM Patient evaluated at bedside. Patient will be treated with 1 L IV fluids for concern for dehydration and she appears unwell and I presume a surgical process is present so I must keep her nothing by mouth, and she is  also going to receive a large IV contrast bolus.  Code aorta protocol ordered.  Ordered for CT-CTA Complete Thoracoabdominal Aorta, CT-Head w/o, DX-Chest (1 view), Lactic Acid, BNP, APTT, PTT, HCG Qual Serum, CBC with differential, CMP, Troponin, Lipase, Estimated GFR, and EKG to evaluate her symptoms.     4:17 AM - Obtained and reviewed past medical records which indicate patient has history of CVA, depression, and anxiety.    4:57 AM - Reviewed patient's lab results as shown above. She will be medicated with Rocephin 2 g and Flagyl 500 mg for possible abdominal infection.  Lactic acid level elevated, white count elevated.  Significant bowel distention but no evidence of perforation on my review of CT scan, no obvious aortic dissection.    5:20 AM - Patient reevaluated at bedside. She is resting in bed with stable vital signs. Discussed lab and radiology results with indicates pancreatitis with a severely elevated lipase. Ordered US-RUQ to further investigate. Patient will receive 50 mcg Fentanyl for further pain management.     5:35 AM - Patient informed she will require a central line given her concerning lab work, and she is a difficult stick nursing staff has attempted multiple peripheral IV attempts and she only has a very small line but is not infusing well. The patient was counseled on risks, benefits, and alternatives. She consents to central line.     5:53 AM I discussed the patient's case and the above findings with Dr. Guerra (Hospitlaist) who kindly agrees to admit the patient. She will be admitted in guarded condition.     6:01 AM - Central line placed at this time as outlined above.     6:32 AM - Dr. Daniel, general surgery, consulted by phone. She will evaluate the patient and agrees with the plan of care.     Patient has had a positive response to IV fluid rehydration, vitals are stable and she is feeling slightly better.  Attempted many times for NG tube placement without success, the patient has had  esophageal strictures in the past and also a Nissen fundoplication.  General surgery team aware of my failed attempts at placement of nasogastric tube.  Ultrasound limited but no acute process, EKG without STEMI.  Labs otherwise stable, lactic acid level clearing with IV fluids.    Upon my evaluation, this patient had a high probability of imminent or life-threatening deterioration due to acute pancreatitis, lactic acidosis, severe dehydration.     I personally provided 35 minutes of total critical care time outside of time spent on separately billable/documented procedures. This required my direct attention, intervention, and management which included the following:  -review of laboratory data  -review of radiology studies  -discussion with consultants: Hospitalist, general surgery  -discussions with family and patient  -monitoring for potential decompensation with serial bedside reassessments  -Aggressive IV fluid rehydration, parenteral antibiotics    Medications   NS infusion 1,000 mL (0 mL Intravenous Held 3/28/19 0530)   senna-docusate (PERICOLACE or SENOKOT S) 8.6-50 MG per tablet 2 Tab (2 Tabs Oral Refused 3/28/19 1738)     And   polyethylene glycol/lytes (MIRALAX) PACKET 1 Packet (not administered)     And   magnesium hydroxide (MILK OF MAGNESIA) suspension 30 mL (not administered)     And   bisacodyl (DULCOLAX) suppository 10 mg (not administered)   heparin injection 5,000 Units (5,000 Units Subcutaneous Given 3/28/19 1536)   Pharmacy Consult Request ...Pain Management Review 1 Each (not administered)     And   oxyCODONE immediate-release (ROXICODONE) tablet 5 mg (not administered)     And   oxyCODONE immediate release (ROXICODONE) tablet 10 mg (not administered)     And   morphine (pf) 4 mg/ml injection 4 mg (4 mg Intravenous Given 3/28/19 1513)   ondansetron (ZOFRAN) syringe/vial injection 4 mg (4 mg Intravenous Given 3/28/19 1732)   ondansetron (ZOFRAN ODT) dispertab 4 mg (not administered)   aspirin EC  (ECOTRIN) tablet 325 mg (0 mg Oral Held 3/28/19 0615)   gabapentin (NEURONTIN) capsule 200 mg (200 mg Oral Refused 3/28/19 1738)   LORazepam (ATIVAN) tablet 0.5 mg (not administered)   MIDAZOLAM HCL 2 MG/2ML INJ SOLN (not administered)   labetalol (NORMODYNE,TRANDATE) injection 10 mg (not administered)   enalaprilat (VASOTEC) injection 1.25 mg (1.25 mg Intravenous Given 3/28/19 1211)   lactated ringers infusion ( Intravenous New Bag 3/28/19 1723)   pantoprazole (PROTONIX) injection 40 mg (not administered)   busPIRone (BUSPAR) tablet 15 mg (15 mg Oral Refused 3/28/19 1738)   NS infusion 1,000 mL (0 mL Intravenous Stopped 3/28/19 0809)   iohexol (OMNIPAQUE) 350 mg/mL (100 mL Intravenous Given 3/28/19 0449)   cefTRIAXone (ROCEPHIN) 2 g in  mL IVPB (0 g Intravenous Stopped 3/28/19 0836)   metroNIDAZOLE (FLAGYL) IVPB 500 mg (0 mg Intravenous Stopped 3/28/19 0911)   NS infusion 1,000 mL (0 mL Intravenous Stopped 3/28/19 0809)   fentaNYL (SUBLIMAZE) injection 50 mcg (50 mcg Intravenous Given 3/28/19 0528)   ondansetron (ZOFRAN) syringe/vial injection 4 mg (4 mg Intravenous Given 3/28/19 0523)   fentaNYL (SUBLIMAZE) injection 50 mcg (50 mcg Intravenous Given 3/28/19 0757)       FINAL IMPRESSION  1. Acute pancreatitis, unspecified complication status, unspecified pancreatitis type    2. Ileus (HCC)    3. Leukocytosis, unspecified type    4. Dehydration    5. Acute chest pain    6. Acute abdominal pain    7. Poor venous access    8. Lactic acidosis    9. Essential hypertension    10. Anxiety    11. History of stroke    12. Fibromyalgia        -ADMIT-     The patient is referred to a primary physician for blood pressure management, diabetic screening, and for all other preventative health concerns.     Pertinent Labs & Imaging studies reviewed and verified by myself, as well as nursing notes and the patient's past medical, family, and social histories (See chart for details).    Results, exam findings, clinical  impression, presumed diagnosis, treatment options, and plan for admission were discussed with the patient and family, and they verbalized understanding, agreed with, and appreciated the plan of care.    I, Claire Wasserman (Addi), am scribing for, and in the presence of, Guanako Clark M.D..    Electronically signed by: Claire Wasserman (Addi), 3/28/2019    Guanako SIDHU M.D. personally performed the services described in this documentation, as scribed by Claire Wasserman in my presence, and it is both accurate and complete.    C.    Portions of this record were made with voice recognition software.  Despite my review, spelling/grammar/context errors may still remain.  Interpretation of this chart should be taken in this context.    The note accurately reflects work and decisions made by me.  Guanako Clark  3/28/2019  6:06 PM

## 2019-03-28 NOTE — PROGRESS NOTES
The attending hospitalist between 07:00-09:00 is Dr. Guerra, please call this physician for orders, updates and questions.  Thank you

## 2019-03-28 NOTE — ASSESSMENT & PLAN NOTE
Stop Ativan given possible delirium  Resume buspar  Decr interruptions at night  Up to chairs w meals  PT/OT

## 2019-03-28 NOTE — H&P
Hospital Medicine History & Physical Note    Date of Service  3/28/2019    Primary Care Physician  William Coleman M.D.    Consultants  Dr. Gomez     Code Status  full    Chief Complaint  abd pain     History of Presenting Illness  78 y.o. female who presented 3/28/2019 with Past medical history of depression hypertension previous stroke who presents with epigastric abdominal pain that radiates to her back.  This patient ingested Demerol may last night and developed acute onset epigastric abdominal pain radiating into the back.  Associated nausea and vomiting.  Multiple episodes of dry heaving.  Pain is 10 out of 10 radiating into her back.  She has not had any new medications or recent travel.  She has a prior history of cholecystectomy and multiple abdominal surgeries.  In the emergency department she is found to have evidence of ileus versus small bowel obstruction and diffuse pancreatitis.  She will be admitted to the hospital for further management.    Review of Systems  Review of Systems   Constitutional: Negative for chills and fever.   HENT: Negative for congestion, hearing loss and tinnitus.    Eyes: Negative for blurred vision, double vision and discharge.   Respiratory: Negative for cough, hemoptysis and shortness of breath.    Cardiovascular: Negative for chest pain, palpitations and leg swelling.   Gastrointestinal: Positive for abdominal pain, nausea and vomiting. Negative for heartburn.   Genitourinary: Negative for dysuria and flank pain.   Musculoskeletal: Negative for joint pain and myalgias.   Skin: Negative for rash.   Neurological: Positive for weakness. Negative for dizziness, sensory change, speech change and focal weakness.   Endo/Heme/Allergies: Negative for environmental allergies. Does not bruise/bleed easily.   Psychiatric/Behavioral: Negative for depression, hallucinations and substance abuse.       Past Medical History   has a past medical history of Anxiety and depression;  "Hypertension; Renal disorder; and Stroke (HCC).    Surgical History   has a past surgical history that includes pham by laparoscopy; neuroma excision (11/21/08); cataract phaco with iol (1/27/2009); cataract phaco with iol (2/10/2009); cholecystectomy; exploratory laparotomy (8/4/2012); and hiatal hernia repair.     Family History  Reviewed and not pertinent    Social History   reports that she has never smoked. She has never used smokeless tobacco. She reports that she uses drugs. She reports that she does not drink alcohol.    Allergies  Allergies   Allergen Reactions   • Amitriptyline    • Ciprofloxacin    • Codeine    • Hydralazine      Face swelling   • Other Drug      Pt states she is allergic to many antibiotics but she does not remember what they are.   • Pcn [Penicillins]    • Potassium Shortness of Breath     \"I feel like I can't breath\"   • Xanax [Alprazolam]    • Zoloft    • Zyrtec [Cetirizine]        Medications  Prior to Admission Medications   Prescriptions Last Dose Informant Patient Reported? Taking?   aspirin EC (ECOTRIN) 81 MG TBEC   Yes No   Sig: Take 4 Tabs by mouth every day.   busPIRone (BUSPAR) 15 MG tablet   Yes No   Sig: Take 30 mg by mouth 2 times a day.   flurazepam (DALMANE) 30 MG capsule   Yes No   Sig: Take 30 mg by mouth every bedtime.   fluticasone (FLONASE) 50 MCG/ACT nasal spray   No No   Sig: Spray 1 Spray in nose every day.   gabapentin (NEURONTIN) 100 MG CAPS   Yes No   Sig: Take 200 mg by mouth 2 Times a Day.   hydrOXYzine (ATARAX) 25 MG TABS   Yes No   Sig: Take 25 mg by mouth every bedtime. Indications: Motion Sickness   levofloxacin (LEVAQUIN) 500 MG tablet   No No   Sig: Take 1.5 Tabs by mouth every day.   lisinopril (PRINIVIL) 10 MG TABS   No No   Sig: Take 1 Tab by mouth every day. Indications: High Blood Pressure   oxycodone, immediate release, (ROXICODONE) 5 MG TABS   No No   Sig: Take 1 Tab by mouth every four hours as needed (moderate to severe pain).   polyethylene " glycol 3350 (MIRALAX) POWD   No No   Sig: Take 17 g by mouth every day.   psyllium (METAMUCIL SMOOTH TEXTURE) 28 % packet   No No   Sig: Take 1 Packet by mouth 2 times a day.   vitamin D, Ergocalciferol, (DRISDOL) 51614 UNITS CAPS capsule   Yes No   Sig: Take 50,000 Units by mouth every Sunday.      Facility-Administered Medications: None       Physical Exam  Temp:  [36.6 °C (97.9 °F)] 36.6 °C (97.9 °F)  Pulse:  [76-86] 77  Resp:  [12-22] 20  BP: (134-136)/(54-67) 136/54  SpO2:  [93 %-95 %] 95 %    Physical Exam   Constitutional: She is oriented to person, place, and time. She appears well-developed and well-nourished. She appears distressed.   Ill appearing   HENT:   Head: Normocephalic and atraumatic.   Eyes: Pupils are equal, round, and reactive to light. Conjunctivae and EOM are normal.   Neck: Normal range of motion. Neck supple. No JVD present.   Cardiovascular: Normal rate, regular rhythm, normal heart sounds and intact distal pulses.    No murmur heard.  Pulmonary/Chest: Effort normal and breath sounds normal. No respiratory distress. She has no wheezes.   Abdominal: Soft. Bowel sounds are normal. She exhibits distension. There is tenderness.   Severe ttp Epigastric area with associated distention   Musculoskeletal: Normal range of motion. She exhibits no edema.   Neurological: She is alert and oriented to person, place, and time. She exhibits normal muscle tone.   Skin: Skin is warm and dry.   Psychiatric: She has a normal mood and affect. Her behavior is normal. Judgment and thought content normal.   Nursing note and vitals reviewed.      Laboratory:  Recent Labs      03/28/19   0400   WBC  20.1*   RBC  5.26   HEMOGLOBIN  15.3   HEMATOCRIT  47.7*   MCV  90.7   MCH  29.1   MCHC  32.1*   RDW  46.2   PLATELETCT  209   MPV  11.0     Recent Labs      03/28/19   0400   SODIUM  141   POTASSIUM  4.1   CHLORIDE  106   CO2  24   GLUCOSE  182*   BUN  24*   CREATININE  1.15   CALCIUM  9.3     Recent Labs       03/28/19   0400   ALTSGPT  26   ASTSGOT  37   ALKPHOSPHAT  105*   TBILIRUBIN  0.3   LIPASE  4447*   GLUCOSE  182*     Recent Labs      03/28/19   0400   APTT  21.3*   INR  0.97     Recent Labs      03/28/19   0400   BNPBTYPENAT  11         Recent Labs      03/28/19   0400   TROPONINI  <0.01       Urinalysis:    No results found     Imaging:  CT-CTA COMPLETE THORACOABDOMINAL AORTA   Final Result         1. No aortic aneurysm or dissection. No pulmonary embolus.   2. Findings suggestive of acute interstitial edematous pancreatitis. Correlate with lipase.   3. Near diffuse small bowel dilatation with nondilated terminal ileum. This may be due to low-grade obstruction, or due to ileus related to pancreatitis. Transition point is not identified.   4. Stable tiny pulmonary nodules, overall unchanged since 2014 study, benign.      CT-HEAD W/O   Final Result      No noncontrast CT evidence of acute intracranial hemorrhage.      Stable nonspecific white matter changes and cerebral volume loss.      US-RUQ    (Results Pending)   DX-CHEST-PORTABLE (1 VIEW)    (Results Pending)         Assessment/Plan:  I anticipate this patient will require at least two midnights for appropriate medical management, necessitating inpatient admission.    * Pancreatitis   Assessment & Plan    Unclear etiology of pancreatitis   RUQ ultrasound pending   Check lipid panel   IVF aggressive   Anti emetics   Pain control   Advance diet as tolerataed     Elevated BUN   Assessment & Plan    IVF and monitor renal fucntion   Suspect due to hypovolemia      SIRS (systemic inflammatory response syndrome) (HCC)   Assessment & Plan    Reactive from pancreatitis   Cont to monitor      Small bowel obstruction (HCC)   Assessment & Plan    SBO VS ileus from pancreaitis   IVF, anti emetics   Dr. Gomez General surgery consulted   NG tube LIS      Lactic acidosis- (present on admission)   Assessment & Plan    Hypovolemic from n/v and pancreatitis   IVF and repeat  lactic ordered     Depression- (present on admission)   Assessment & Plan    On buspar      History of stroke- (present on admission)   Assessment & Plan    Hx of      Anxiety- (present on admission)   Assessment & Plan    Prn ativan ordered  Resume buspar     GERD (gastroesophageal reflux disease)- (present on admission)   Assessment & Plan    Hx of cont to montior          VTE prophylaxis: heparin

## 2019-03-28 NOTE — ED NOTES
Pt resting comfortably at this time after pain medication administration, bed in lowered position, side rails up, call light in reach,son at bedside

## 2019-03-29 LAB
ALBUMIN SERPL BCP-MCNC: 2.9 G/DL (ref 3.2–4.9)
ALBUMIN/GLOB SERPL: 1 G/DL
ALP SERPL-CCNC: 65 U/L (ref 30–99)
ALT SERPL-CCNC: 34 U/L (ref 2–50)
ANION GAP SERPL CALC-SCNC: 6 MMOL/L (ref 0–11.9)
AST SERPL-CCNC: 33 U/L (ref 12–45)
BASOPHILS # BLD AUTO: 0.7 % (ref 0–1.8)
BASOPHILS # BLD: 0.14 K/UL (ref 0–0.12)
BILIRUB SERPL-MCNC: 0.5 MG/DL (ref 0.1–1.5)
BUN SERPL-MCNC: 16 MG/DL (ref 8–22)
CALCIUM SERPL-MCNC: 8 MG/DL (ref 8.5–10.5)
CHLORIDE SERPL-SCNC: 112 MMOL/L (ref 96–112)
CO2 SERPL-SCNC: 24 MMOL/L (ref 20–33)
CREAT SERPL-MCNC: 0.74 MG/DL (ref 0.5–1.4)
EOSINOPHIL # BLD AUTO: 0.01 K/UL (ref 0–0.51)
EOSINOPHIL NFR BLD: 0 % (ref 0–6.9)
ERYTHROCYTE [DISTWIDTH] IN BLOOD BY AUTOMATED COUNT: 49 FL (ref 35.9–50)
GLOBULIN SER CALC-MCNC: 3 G/DL (ref 1.9–3.5)
GLUCOSE SERPL-MCNC: 133 MG/DL (ref 65–99)
HCT VFR BLD AUTO: 34.6 % (ref 37–47)
HGB BLD-MCNC: 12 G/DL (ref 12–16)
IMM GRANULOCYTES # BLD AUTO: 0.15 K/UL (ref 0–0.11)
IMM GRANULOCYTES NFR BLD AUTO: 0.7 % (ref 0–0.9)
LIPASE SERPL-CCNC: 423 U/L (ref 11–82)
LYMPHOCYTES # BLD AUTO: 1.44 K/UL (ref 1–4.8)
LYMPHOCYTES NFR BLD: 6.9 % (ref 22–41)
MCH RBC QN AUTO: 32.8 PG (ref 27–33)
MCHC RBC AUTO-ENTMCNC: 34.7 G/DL (ref 33.6–35)
MCV RBC AUTO: 94.5 FL (ref 81.4–97.8)
MONOCYTES # BLD AUTO: 0.98 K/UL (ref 0–0.85)
MONOCYTES NFR BLD AUTO: 4.7 % (ref 0–13.4)
NEUTROPHILS # BLD AUTO: 18.1 K/UL (ref 2–7.15)
NEUTROPHILS NFR BLD: 87 % (ref 44–72)
NRBC # BLD AUTO: 0.03 K/UL
NRBC BLD-RTO: 0.1 /100 WBC
PLATELET # BLD AUTO: 197 K/UL (ref 164–446)
PMV BLD AUTO: 10.6 FL (ref 9–12.9)
POTASSIUM SERPL-SCNC: 4.1 MMOL/L (ref 3.6–5.5)
PROT SERPL-MCNC: 5.9 G/DL (ref 6–8.2)
RBC # BLD AUTO: 3.66 M/UL (ref 4.2–5.4)
SODIUM SERPL-SCNC: 142 MMOL/L (ref 135–145)
WBC # BLD AUTO: 20.8 K/UL (ref 4.8–10.8)

## 2019-03-29 PROCEDURE — 80053 COMPREHEN METABOLIC PANEL: CPT

## 2019-03-29 PROCEDURE — 700111 HCHG RX REV CODE 636 W/ 250 OVERRIDE (IP): Performed by: HOSPITALIST

## 2019-03-29 PROCEDURE — 83690 ASSAY OF LIPASE: CPT

## 2019-03-29 PROCEDURE — 99233 SBSQ HOSP IP/OBS HIGH 50: CPT | Performed by: HOSPITALIST

## 2019-03-29 PROCEDURE — A9270 NON-COVERED ITEM OR SERVICE: HCPCS | Performed by: HOSPITALIST

## 2019-03-29 PROCEDURE — 700105 HCHG RX REV CODE 258: Performed by: HOSPITALIST

## 2019-03-29 PROCEDURE — 700102 HCHG RX REV CODE 250 W/ 637 OVERRIDE(OP): Performed by: HOSPITALIST

## 2019-03-29 PROCEDURE — 85025 COMPLETE CBC W/AUTO DIFF WBC: CPT

## 2019-03-29 PROCEDURE — C9113 INJ PANTOPRAZOLE SODIUM, VIA: HCPCS | Performed by: NURSE PRACTITIONER

## 2019-03-29 PROCEDURE — 770006 HCHG ROOM/CARE - MED/SURG/GYN SEMI*

## 2019-03-29 PROCEDURE — 97162 PT EVAL MOD COMPLEX 30 MIN: CPT

## 2019-03-29 PROCEDURE — 700111 HCHG RX REV CODE 636 W/ 250 OVERRIDE (IP): Performed by: NURSE PRACTITIONER

## 2019-03-29 RX ADMIN — ASPIRIN 325 MG: 325 TABLET, COATED ORAL at 06:04

## 2019-03-29 RX ADMIN — HEPARIN SODIUM 5000 UNITS: 5000 INJECTION, SOLUTION INTRAVENOUS; SUBCUTANEOUS at 12:34

## 2019-03-29 RX ADMIN — SODIUM CHLORIDE, POTASSIUM CHLORIDE, SODIUM LACTATE AND CALCIUM CHLORIDE: 600; 310; 30; 20 INJECTION, SOLUTION INTRAVENOUS at 18:13

## 2019-03-29 RX ADMIN — LORAZEPAM 0.5 MG: 1 TABLET ORAL at 06:04

## 2019-03-29 RX ADMIN — MORPHINE SULFATE 4 MG: 4 INJECTION INTRAVENOUS at 12:34

## 2019-03-29 RX ADMIN — MORPHINE SULFATE 4 MG: 4 INJECTION INTRAVENOUS at 04:16

## 2019-03-29 RX ADMIN — GABAPENTIN 200 MG: 100 CAPSULE ORAL at 18:13

## 2019-03-29 RX ADMIN — HEPARIN SODIUM 5000 UNITS: 5000 INJECTION, SOLUTION INTRAVENOUS; SUBCUTANEOUS at 06:03

## 2019-03-29 RX ADMIN — ENALAPRILAT 1.25 MG: 1.25 INJECTION INTRAVENOUS at 08:15

## 2019-03-29 RX ADMIN — SODIUM CHLORIDE, POTASSIUM CHLORIDE, SODIUM LACTATE AND CALCIUM CHLORIDE: 600; 310; 30; 20 INJECTION, SOLUTION INTRAVENOUS at 10:57

## 2019-03-29 RX ADMIN — PANTOPRAZOLE SODIUM 40 MG: 40 INJECTION, POWDER, LYOPHILIZED, FOR SOLUTION INTRAVENOUS at 06:12

## 2019-03-29 RX ADMIN — GABAPENTIN 200 MG: 100 CAPSULE ORAL at 06:04

## 2019-03-29 RX ADMIN — BUSPIRONE HYDROCHLORIDE 15 MG: 30 TABLET ORAL at 06:03

## 2019-03-29 RX ADMIN — ONDANSETRON 4 MG: 2 INJECTION INTRAMUSCULAR; INTRAVENOUS at 04:16

## 2019-03-29 RX ADMIN — MORPHINE SULFATE 4 MG: 4 INJECTION INTRAVENOUS at 08:20

## 2019-03-29 RX ADMIN — HEPARIN SODIUM 5000 UNITS: 5000 INJECTION, SOLUTION INTRAVENOUS; SUBCUTANEOUS at 22:49

## 2019-03-29 RX ADMIN — PROCHLORPERAZINE MALEATE 10 MG: 10 TABLET, FILM COATED ORAL at 06:04

## 2019-03-29 RX ADMIN — BUSPIRONE HYDROCHLORIDE 15 MG: 30 TABLET ORAL at 10:57

## 2019-03-29 RX ADMIN — SENNOSIDES, DOCUSATE SODIUM 2 TABLET: 50; 8.6 TABLET, FILM COATED ORAL at 06:04

## 2019-03-29 RX ADMIN — MORPHINE SULFATE 4 MG: 4 INJECTION INTRAVENOUS at 18:13

## 2019-03-29 RX ADMIN — BUSPIRONE HYDROCHLORIDE 15 MG: 30 TABLET ORAL at 18:13

## 2019-03-29 ASSESSMENT — COGNITIVE AND FUNCTIONAL STATUS - GENERAL
WALKING IN HOSPITAL ROOM: A LITTLE
WALKING IN HOSPITAL ROOM: A LITTLE
SUGGESTED CMS G CODE MODIFIER MOBILITY: CK
SUGGESTED CMS G CODE MODIFIER MOBILITY: CL
TURNING FROM BACK TO SIDE WHILE IN FLAT BAD: UNABLE
MOVING FROM LYING ON BACK TO SITTING ON SIDE OF FLAT BED: UNABLE
STANDING UP FROM CHAIR USING ARMS: A LITTLE
MOBILITY SCORE: 11
SUGGESTED CMS G CODE MODIFIER DAILY ACTIVITY: CK
MOBILITY SCORE: 18
DRESSING REGULAR LOWER BODY CLOTHING: A LITTLE
DRESSING REGULAR UPPER BODY CLOTHING: A LITTLE
DAILY ACTIVITIY SCORE: 19
MOVING FROM LYING ON BACK TO SITTING ON SIDE OF FLAT BED: A LITTLE
PERSONAL GROOMING: A LITTLE
CLIMB 3 TO 5 STEPS WITH RAILING: A LITTLE
STANDING UP FROM CHAIR USING ARMS: A LITTLE
TOILETING: A LITTLE
MOVING TO AND FROM BED TO CHAIR: UNABLE
TURNING FROM BACK TO SIDE WHILE IN FLAT BAD: A LITTLE
CLIMB 3 TO 5 STEPS WITH RAILING: A LOT
MOVING TO AND FROM BED TO CHAIR: A LITTLE
HELP NEEDED FOR BATHING: A LITTLE

## 2019-03-29 ASSESSMENT — ENCOUNTER SYMPTOMS
WEIGHT LOSS: 0
CHILLS: 0
ABDOMINAL PAIN: 1
VOMITING: 0
FEVER: 0
NAUSEA: 1

## 2019-03-29 ASSESSMENT — GAIT ASSESSMENTS: GAIT LEVEL OF ASSIST: REFUSED

## 2019-03-29 NOTE — DIETARY
"Nutrition services: Day 1 of admit.  Luz Maria Saavedra is a 78 y.o. female with admitting DX of Pancreatitis.     Consult received for poor PO intake PTA.     Pt with PMH including Stroke, Renal disorder, HTN, anxiety and depression. Per H&P, pt presented with abd pain. Pt had associated nausea and vomiting. Pt with hx of multiple abdominal surgeries.     Per ED note on 3/28, pt's DTR reports her feeling unwell x 4-5 days with abdominal distension, pain, and N/V.     Nursing was unable to place NG tube despite trying 20 times. Continued with anti-emetics in the meantime awaiting consult for Dr. Daniel regarding placement of NG with endoscopy or surgical assistance. MD Daniel noted on 3/29 to hold NGT related to pt with fundoplication.     Assessment:  Height: 162.6 cm (5' 4\")  Weight: 81.2 kg (179 lb 0.2 oz)  Body mass index is 30.73 kg/m².  Diet/Intake: Pt initially on CLD diet though currently has ADAT orders.    Per RN NPO day #1/2?     Evaluation:   1. Meds: Buspar, Protonix 40mg QD, Senna   2. Fluids:  3. GI/: MD Daniel noted on 3/29, pt abd less distended today, though tender..   4. Labs: Glucose 133 Alb 2.9 Ca 8.0  Lipase 423 Lactic Acid 3.1   5. Spoke with RN regarding currently without diet orders despite ADAT orders. RN stated plan for surgical placement of NGT and pt should be NPO.     Malnutrition Risk: Pt with no recent acute history, poor PO acute (4-5 days and do not suspect malnutrition at this time. Negative to unplanned wt loss per nursing nutrition screen.     Recommendations/Plan:  1. Nutrition per MD discretion.   2. Awaiting diet advancement when medically feasible.   3. Encourage intake of 50% or greater.   4. Document intake of all meals  as % taken in ADL's to provide interdisciplinary communication across all shifts.   5. Monitor weight.  6. Nutrition rep will continue to see patient for ongoing meal and snack preferences.             "

## 2019-03-29 NOTE — PROGRESS NOTES
Report received from Salomón PEREZ RN. Pt arrived to the floor via gurney with transport at 1700. Family at bedside. C/O nausea and pain, medicated per MAR, no SOB. Assessment complete. Discussed POC, , pt verbalizes understanding. Explained importance of calling before getting OOB. Call light and belongings within reach. Bed alarm on. Bed in the lowest position. Treaded socks in place. Hourly rounding in progress. Will continue to monitor .

## 2019-03-29 NOTE — CARE PLAN
Problem: Pain Management  Goal: Pain level will decrease to patient's comfort goal  Patient requesting IV Morphine as needed for pain.     Problem: Psychosocial Needs:  Goal: Level of anxiety will decrease  Patient very anxious and defensive regarding education and plan of care for pancreatitis. This RN spent hours educating patient and family and reassuring patient.

## 2019-03-29 NOTE — PROGRESS NOTES
Hospital Medicine Daily Progress Note    Date of Service  3/29/2019    Chief Complaint  78 y.o. female admitted 3/28/2019 with acute pancreatitis, and ileus versus small bowel obstruction    Hospital Course    Patient presented with abdominal pain and distention that radiates to her back.  She had associated nausea and vomiting.  She does report ingesting Demerol prior to arrival.  She has had multiple prior abdominal surgeries including cholecystectomy, Nissen fundoplication and takedown of multiple adhesions.  CT CTA thoracoabdominal aorta again redemonstrated pancreatitis and showed near diffuse small bowel dilatation with nondilated terminal ileum.  Differential included obstruction versus ileus related to pancreatitis.  Transition point was not identified.  General surgery consultation was obtained with instructions to place an NG tube.  However after multiple attempts this was unsuccessful.  The patient was transferred to the medical floor and kept strictly n.p.o.  She was given IV blood pressure support, IV PPI.  Her abdomen is less distended and tender today.  She is burping and belching regularly.  She was evaluated again by general surgery who feels given her esophageal history and improvement today replacement of the NG tube is not warranted.  She is not a surgical candidate at present.  We will continue medical management.      Interval Problem Update  Lipase markedly reduced today at 423  Abdominal pain--less  Lipid panel benign  Anemia ---  Leukocytosis ---  Blood cultures no growth to date    Consultants/Specialty  General surgery    Code Status  Full    Disposition  Home likely    Review of Systems  Review of Systems   Constitutional: Negative for chills, fever, malaise/fatigue and weight loss.   Gastrointestinal: Positive for abdominal pain and nausea. Negative for vomiting.   Genitourinary: Negative for dysuria, frequency and urgency.        Physical Exam  Temp:  [36.4 °C (97.5 °F)-37.4 °C (99.3  °F)] 37.4 °C (99.3 °F)  Pulse:  [] 98  Resp:  [16-20] 20  BP: (120-185)/(55-86) 163/76  SpO2:  [92 %-99 %] 92 %    Physical Exam   Constitutional: She is oriented to person, place, and time. She appears well-developed and well-nourished. No distress.   HENT:   Head: Normocephalic and atraumatic.   Eyes: Pupils are equal, round, and reactive to light. EOM are normal.   Neck: Neck supple.   Cardiovascular: Normal rate, regular rhythm and normal heart sounds.    Pulmonary/Chest: Effort normal and breath sounds normal. No respiratory distress. She has no wheezes. She has no rales.   Abdominal: Soft. She exhibits distension. Bowel sounds are decreased. There is generalized tenderness.   Neurological: She is alert and oriented to person, place, and time. No cranial nerve deficit.   Skin: Skin is warm and dry.       Fluids    Intake/Output Summary (Last 24 hours) at 03/29/19 1534  Last data filed at 03/28/19 2032   Gross per 24 hour   Intake          7996.67 ml   Output                0 ml   Net          7996.67 ml       Laboratory  Recent Labs      03/28/19   0400   WBC  20.1*   RBC  5.26   HEMOGLOBIN  15.3   HEMATOCRIT  47.7*   MCV  90.7   MCH  29.1   MCHC  32.1*   RDW  46.2   PLATELETCT  209   MPV  11.0     Recent Labs      03/28/19   0400  03/29/19   1012   SODIUM  141  142   POTASSIUM  4.1  4.1   CHLORIDE  106  112   CO2  24  24   GLUCOSE  182*  133*   BUN  24*  16   CREATININE  1.15  0.74   CALCIUM  9.3  8.0*     Recent Labs      03/28/19   0400   APTT  21.3*   INR  0.97     Recent Labs      03/28/19   0400   BNPBTYPENAT  11     Recent Labs      03/28/19   0820   TRIGLYCERIDE  89   HDL  48   LDL  67       Imaging  DX-CHEST-PORTABLE (1 VIEW)   Final Result      Right central line projects over the superior cavoatrial junction. No pneumothorax.      Stable elevation of the right hemidiaphragm.      Bibasilar atelectasis.      Stable prominence of the cardiomediastinal silhouette.      Atherosclerotic plaque.          US-RUQ   Final Result         1. Limited study due to poor acoustic windows and excessive bowel gas.   2. Hepatic steatosis.   3. Cholecystectomy.      CT-CTA COMPLETE THORACOABDOMINAL AORTA   Final Result         1. No aortic aneurysm or dissection. No pulmonary embolus.   2. Findings suggestive of acute interstitial edematous pancreatitis. Correlate with lipase.   3. Near diffuse small bowel dilatation with nondilated terminal ileum. This may be due to low-grade obstruction, or due to ileus related to pancreatitis. Transition point is not identified.   4. Stable tiny pulmonary nodules, overall unchanged since 2014 study, benign.      CT-HEAD W/O   Final Result      No noncontrast CT evidence of acute intracranial hemorrhage.      Stable nonspecific white matter changes and cerebral volume loss.           Assessment/Plan  * Pancreatitis   Assessment & Plan    Unclear etiology of pancreatitis   RUQ ultrasound OK  IVF aggressive   Anti emetics   Pain control   Advance diet as tolerataed  Improving     Elevated BUN   Assessment & Plan    IVF and monitor renal fucntion   Suspect due to hypovolemia      SIRS (systemic inflammatory response syndrome) (HCC)   Assessment & Plan    Reactive from pancreatitis   Cont to monitor      Small bowel obstruction (HCC)   Assessment & Plan    SBO VS ileus from pancreaitis   IVF, anti emetics   Dr. Gomez General surgery consulted   NG tube LIS      Lactic acidosis- (present on admission)   Assessment & Plan    Hypovolemic from n/v and pancreatitis   IVF and repeat lactic ordered     Depression- (present on admission)   Assessment & Plan    On buspar      History of stroke- (present on admission)   Assessment & Plan    Hx of      Anxiety- (present on admission)   Assessment & Plan    Prn ativan ordered  Resume buspar     GERD (gastroesophageal reflux disease)- (present on admission)   Assessment & Plan    Hx of   Cont IV PPI          VTE prophylaxis: SCDs

## 2019-03-29 NOTE — PROGRESS NOTES
Trauma / Surgical Daily Progress Note    Date of Service  3/29/2019    Chief Complaint  78 y.o. female admitted 3/28/2019 with Pancreatitis    Interval Events  Pain somewhat better. No labs done today. Abd exam improved. Still no surgical issues. Hold on NGT as she has a fundoplication.    Review of Systems  Review of Systems   Gastrointestinal: Positive for abdominal pain.        Vital Signs  Temp:  [36.4 °C (97.5 °F)-37.2 °C (99 °F)] 37.1 °C (98.8 °F)  Pulse:  [] 96  Resp:  [9-21] 20  BP: (120-185)/(55-86) 120/73  SpO2:  [96 %-100 %] 99 %    Physical Exam  Physical Exam   Constitutional: She appears well-developed.   HENT:   Head: Normocephalic.   Neck: Neck supple.   Cardiovascular: Normal rate.    Pulmonary/Chest: Effort normal.   Abdominal: She exhibits distension. There is tenderness.   Less distended today   Musculoskeletal: Normal range of motion.   Neurological: She is alert.   Skin: Skin is warm.       Laboratory  Recent Results (from the past 24 hour(s))   LACTIC ACID    Collection Time: 03/28/19 12:10 PM   Result Value Ref Range    Lactic Acid 3.1 (H) 0.5 - 2.0 mmol/L       Fluids    Intake/Output Summary (Last 24 hours) at 03/29/19 1002  Last data filed at 03/28/19 2032   Gross per 24 hour   Intake          7996.67 ml   Output                0 ml   Net          7996.67 ml       Core Measures & Quality Metrics  Labs reviewed, Medications reviewed and Radiology images reviewed  Myers catheter: No Myers  Central line in place: Need for access          Antibiotics: Treating active infection/contamination beyond 24 hours perioperative coverage      AKILA Score  ETOH Screening    Assessment/Plan  No new Assessment & Plan notes have been filed under this hospital service since the last note was generated.  Service: Surgery General      Discussed patient condition with RN and Patient.  CRITICAL CARE TIME EXCLUDING PROCEDURES: 20    minutes

## 2019-03-29 NOTE — PROGRESS NOTES
· 2 RN skin check complete with ZULEMA Recinos.  · Sacrum red but blanching. Noted bruising to bilateral arms.   · Devices in place: nasal cannula.  · Skin assessed under devices yes.  · Confirmed pressure ulcers found on none.  The following interventions in place: pt turns self from side to side, barrier cream applies to sacrum.

## 2019-03-29 NOTE — PROGRESS NOTES
Spiritual Care Note    Patient Information     Patient's Name: Luz Maria Saavedra   MRN: [id number]    YOB: 1940   Age and Gender: 78 y.o. female   Service Area:    Room (and Bed): [place] [address]   Ethnicity or Nationality: [facility]    Primary Language: English and Citizen of the [place]   Gnosticist/Spiritual preference: Religious   Place of Residence:    Family/Friends/Others Present:    Clinical Team Present:    Medical Diagnosis(-es)/Procedure(s):    Code Status: Full Code    Date of Admission: 3/28/2019   Length of Stay: 1 days        Spiritual Care Provider Information:  Name of Spiritual Care Provider: Suman  Title of Spiritual Care Provider:      Total time : 20 minutes    Spiritual Screen Results:    Gen Nursing  Spiritual Screen  Is your spiritual health or inner well-being important to you as you cope with your medical condition?: Yes  Would you like to receive a visit from our Spiritual Care team or your own Mosque or spiritual leader?: Yes  Was spiritual care education provided to the patient?: Yes  Cultural/Spiritual Needs During Care: Cultural     Palliative Care  PC Gnosticist/Spiritual Screening  Was spiritual care education provided to the patient?: Yes      Encounter/Request Information  Encounter/Request Type   Visited With: Patient  Nature of the Visit: Initial, On shift  Continue Visiting: No  Crisis Visit: Critical care  Referral From/ Origin of Request: Epic nursing  Referral To: Community clergy    Religous Needs/Values  Gnosticist Needs Visit  Gnosticist Needs: Prayer  Ritual Needs Visit  Ritual Needs: [place]    Spiritual Assessment   Spiritual Care Encounters  Observations/Symptoms: Accepting  Interacton/Conversation: Requested   Assessment: Need  Need: Malinda Issues  Intended Effects: Demonstrate Caring and Concern  Outcomes: Acceptance  Plan: No Further Visits    Notes:

## 2019-03-29 NOTE — PROGRESS NOTES
Report received from day shift RN at bedside.  Patient up to restroom during shift change.    Patient alert and oriented x 4.  Patient states she understands to call for assistance when getting out of bed.    Patient on 2L O2 via NC; IVF infusing.  Patient slight anxious concerning plan of care.  POC explained to patient as well as the need to call for assistance prior to getting up out of bed.    Patient is currently NPO with orders to advance diet as tolerated.  Patient having nausea when consuming ice chips.  IV pain and nausea medications given PRN.      Bed alarm on and in use; bed locked and in the lowest position.  Call light and personal belongings within reach.

## 2019-03-29 NOTE — THERAPY
"Pt is an 77 y/o female admitted with abdominal pain. Pt initally reluctant to mobilize with PT as she wanted to rest, but agreeable with encouragement. Pt tolerated sitting EOB and then requested to lie down to rest. Pt able to take side steps at EOB with Min A to allow for better positioning upon return BTB. Pt refused further mobility. Pt will benefit from acute PT interventions to address present impairments and optimize function and safety prior to DC home.       Physical Therapy Evaluation completed.   Bed Mobility:  Supine to Sit: Moderate Assist  Transfers: Sit to Stand: Minimal Assist. Side stepping at EOB with Min HHA x3 steps  Gait: Level Of Assist: Refused   Plan of Care: Will benefit from Physical Therapy 3 times per week  Discharge Recommendations: Equipment: Will Continue to Assess for Equipment Needs. Post-acute therapy: DC disposition is dependent on pt's mobility progress while in acute setting. Based on limited motivation to mobilize this afternoon, unable to fully assess placement vs. home with HH.       See \"Rehab Therapy-Acute\" Patient Summary Report for complete documentation.     "

## 2019-03-29 NOTE — PROGRESS NOTES
"Assumed care of patient at 0820. Patient complaining of mid chest pain and stating she can't get a full breath. Her BP is elevated and she is laying on her back. Medicated per MAR for BP and for pain. Educated patient she needs to sit up to relieve pressure on lungs and heart and that due to her obstruction, her belly is full and also creating pressure. This RN stated patient needs to be NPO to decrease obstruction and let pancreas/bowels rest. Patient constantly replying to RN stating \"I am 78. I know my body and I need water and to lay down and you are too young to know anything\".   Family also very time consuming with questions and demands for drinks for them. This RN spent hours with family and patient educating and reassuring.      "

## 2019-03-29 NOTE — CARE PLAN
Problem: Safety  Goal: Will remain free from injury  Outcome: PROGRESSING AS EXPECTED  Treaded socks in place, bed in the lowest position, bed alarm on, call light and belongings within reach, pt call for assistance appropriately    Problem: Pain Management  Goal: Pain level will decrease to patient's comfort goal  Outcome: PROGRESSING AS EXPECTED  Medicated with IV morphine per MAR with adequate pain control, hourly rounding in progress

## 2019-03-29 NOTE — CARE PLAN
Problem: Nutritional:  Goal: Achieve adequate nutritional intake  Advance diet beyond CLD and Patient will consume 50% or greater of meals  Outcome: NOT MET

## 2019-03-29 NOTE — THERAPY
Occupational Therapy Contact Note:    OT orders received, pt is to be evaluated general sx today, will hold eval until POC is established.    Elsie Livingston, OTR/L  Pager: [phone]

## 2019-03-30 ENCOUNTER — APPOINTMENT (OUTPATIENT)
Dept: RADIOLOGY | Facility: MEDICAL CENTER | Age: 79
DRG: 388 | End: 2019-03-30
Attending: HOSPITALIST
Payer: MEDICARE

## 2019-03-30 PROBLEM — K85.90 PANCREATITIS: Status: RESOLVED | Noted: 2019-03-28 | Resolved: 2019-03-30

## 2019-03-30 PROBLEM — R65.10 SIRS (SYSTEMIC INFLAMMATORY RESPONSE SYNDROME) (HCC): Status: RESOLVED | Noted: 2019-03-28 | Resolved: 2019-03-30

## 2019-03-30 PROBLEM — R79.9 ELEVATED BUN: Status: RESOLVED | Noted: 2019-03-28 | Resolved: 2019-03-30

## 2019-03-30 LAB
ALBUMIN SERPL BCP-MCNC: 3.1 G/DL (ref 3.2–4.9)
ALBUMIN/GLOB SERPL: 1.3 G/DL
ALP SERPL-CCNC: 60 U/L (ref 30–99)
ALT SERPL-CCNC: 28 U/L (ref 2–50)
ANION GAP SERPL CALC-SCNC: 5 MMOL/L (ref 0–11.9)
AST SERPL-CCNC: 27 U/L (ref 12–45)
BASOPHILS # BLD AUTO: 0.4 % (ref 0–1.8)
BASOPHILS # BLD: 0.08 K/UL (ref 0–0.12)
BILIRUB SERPL-MCNC: 0.7 MG/DL (ref 0.1–1.5)
BUN SERPL-MCNC: 14 MG/DL (ref 8–22)
CALCIUM SERPL-MCNC: 8.2 MG/DL (ref 8.5–10.5)
CHLORIDE SERPL-SCNC: 111 MMOL/L (ref 96–112)
CO2 SERPL-SCNC: 25 MMOL/L (ref 20–33)
CREAT SERPL-MCNC: 0.78 MG/DL (ref 0.5–1.4)
EOSINOPHIL # BLD AUTO: 0.04 K/UL (ref 0–0.51)
EOSINOPHIL NFR BLD: 0.2 % (ref 0–6.9)
ERYTHROCYTE [DISTWIDTH] IN BLOOD BY AUTOMATED COUNT: 49.6 FL (ref 35.9–50)
GLOBULIN SER CALC-MCNC: 2.4 G/DL (ref 1.9–3.5)
GLUCOSE SERPL-MCNC: 119 MG/DL (ref 65–99)
HCT VFR BLD AUTO: 29.8 % (ref 37–47)
HGB BLD-MCNC: 11.1 G/DL (ref 12–16)
IMM GRANULOCYTES # BLD AUTO: 0.12 K/UL (ref 0–0.11)
IMM GRANULOCYTES NFR BLD AUTO: 0.7 % (ref 0–0.9)
LACTATE BLD-SCNC: 0.7 MMOL/L (ref 0.5–2)
LYMPHOCYTES # BLD AUTO: 2.09 K/UL (ref 1–4.8)
LYMPHOCYTES NFR BLD: 11.5 % (ref 22–41)
MCH RBC QN AUTO: 35.6 PG (ref 27–33)
MCHC RBC AUTO-ENTMCNC: 37.2 G/DL (ref 33.6–35)
MCV RBC AUTO: 95.5 FL (ref 81.4–97.8)
MONOCYTES # BLD AUTO: 0.9 K/UL (ref 0–0.85)
MONOCYTES NFR BLD AUTO: 4.9 % (ref 0–13.4)
NEUTROPHILS # BLD AUTO: 15.02 K/UL (ref 2–7.15)
NEUTROPHILS NFR BLD: 82.3 % (ref 44–72)
NRBC # BLD AUTO: 0 K/UL
NRBC BLD-RTO: 0 /100 WBC
PLATELET # BLD AUTO: 164 K/UL (ref 164–446)
PMV BLD AUTO: 9.8 FL (ref 9–12.9)
POTASSIUM SERPL-SCNC: 3.8 MMOL/L (ref 3.6–5.5)
PROT SERPL-MCNC: 5.5 G/DL (ref 6–8.2)
RBC # BLD AUTO: 3.12 M/UL (ref 4.2–5.4)
SODIUM SERPL-SCNC: 141 MMOL/L (ref 135–145)
WBC # BLD AUTO: 18.3 K/UL (ref 4.8–10.8)

## 2019-03-30 PROCEDURE — 83605 ASSAY OF LACTIC ACID: CPT

## 2019-03-30 PROCEDURE — 71045 X-RAY EXAM CHEST 1 VIEW: CPT

## 2019-03-30 PROCEDURE — 93010 ELECTROCARDIOGRAM REPORT: CPT | Performed by: INTERNAL MEDICINE

## 2019-03-30 PROCEDURE — C9113 INJ PANTOPRAZOLE SODIUM, VIA: HCPCS | Performed by: NURSE PRACTITIONER

## 2019-03-30 PROCEDURE — A9270 NON-COVERED ITEM OR SERVICE: HCPCS | Performed by: NURSE PRACTITIONER

## 2019-03-30 PROCEDURE — 700102 HCHG RX REV CODE 250 W/ 637 OVERRIDE(OP): Performed by: HOSPITALIST

## 2019-03-30 PROCEDURE — 700101 HCHG RX REV CODE 250

## 2019-03-30 PROCEDURE — 99232 SBSQ HOSP IP/OBS MODERATE 35: CPT | Performed by: HOSPITALIST

## 2019-03-30 PROCEDURE — 94640 AIRWAY INHALATION TREATMENT: CPT

## 2019-03-30 PROCEDURE — 700105 HCHG RX REV CODE 258: Performed by: HOSPITALIST

## 2019-03-30 PROCEDURE — 700102 HCHG RX REV CODE 250 W/ 637 OVERRIDE(OP): Performed by: NURSE PRACTITIONER

## 2019-03-30 PROCEDURE — 700105 HCHG RX REV CODE 258: Performed by: NURSE PRACTITIONER

## 2019-03-30 PROCEDURE — 72040 X-RAY EXAM NECK SPINE 2-3 VW: CPT

## 2019-03-30 PROCEDURE — 73060 X-RAY EXAM OF HUMERUS: CPT | Mod: RT

## 2019-03-30 PROCEDURE — A9270 NON-COVERED ITEM OR SERVICE: HCPCS | Performed by: HOSPITALIST

## 2019-03-30 PROCEDURE — 97166 OT EVAL MOD COMPLEX 45 MIN: CPT

## 2019-03-30 PROCEDURE — 700111 HCHG RX REV CODE 636 W/ 250 OVERRIDE (IP): Performed by: NURSE PRACTITIONER

## 2019-03-30 PROCEDURE — 770006 HCHG ROOM/CARE - MED/SURG/GYN SEMI*

## 2019-03-30 PROCEDURE — 700111 HCHG RX REV CODE 636 W/ 250 OVERRIDE (IP): Performed by: HOSPITALIST

## 2019-03-30 PROCEDURE — 80053 COMPREHEN METABOLIC PANEL: CPT

## 2019-03-30 PROCEDURE — 93005 ELECTROCARDIOGRAM TRACING: CPT | Performed by: HOSPITALIST

## 2019-03-30 PROCEDURE — 85025 COMPLETE CBC W/AUTO DIFF WBC: CPT

## 2019-03-30 RX ORDER — OMEPRAZOLE 20 MG/1
20 CAPSULE, DELAYED RELEASE ORAL 2 TIMES DAILY
Status: DISCONTINUED | OUTPATIENT
Start: 2019-03-30 | End: 2019-04-01 | Stop reason: HOSPADM

## 2019-03-30 RX ORDER — IPRATROPIUM BROMIDE AND ALBUTEROL SULFATE 2.5; .5 MG/3ML; MG/3ML
SOLUTION RESPIRATORY (INHALATION)
Status: COMPLETED
Start: 2019-03-30 | End: 2019-03-30

## 2019-03-30 RX ORDER — TEMAZEPAM 15 MG/1
15 CAPSULE ORAL
Status: DISCONTINUED | OUTPATIENT
Start: 2019-03-30 | End: 2019-04-01 | Stop reason: HOSPADM

## 2019-03-30 RX ORDER — ECHINACEA PURPUREA EXTRACT 125 MG
2 TABLET ORAL 4 TIMES DAILY PRN
Status: DISCONTINUED | OUTPATIENT
Start: 2019-03-30 | End: 2019-04-01 | Stop reason: HOSPADM

## 2019-03-30 RX ADMIN — PANTOPRAZOLE SODIUM 40 MG: 40 INJECTION, POWDER, LYOPHILIZED, FOR SOLUTION INTRAVENOUS at 06:14

## 2019-03-30 RX ADMIN — HEPARIN SODIUM 5000 UNITS: 5000 INJECTION, SOLUTION INTRAVENOUS; SUBCUTANEOUS at 06:14

## 2019-03-30 RX ADMIN — BUSPIRONE HYDROCHLORIDE 15 MG: 30 TABLET ORAL at 06:14

## 2019-03-30 RX ADMIN — SODIUM CHLORIDE, POTASSIUM CHLORIDE, SODIUM LACTATE AND CALCIUM CHLORIDE: 600; 310; 30; 20 INJECTION, SOLUTION INTRAVENOUS at 01:13

## 2019-03-30 RX ADMIN — OXYCODONE HYDROCHLORIDE 5 MG: 5 TABLET ORAL at 18:40

## 2019-03-30 RX ADMIN — MORPHINE SULFATE 4 MG: 4 INJECTION INTRAVENOUS at 06:14

## 2019-03-30 RX ADMIN — SODIUM CHLORIDE, POTASSIUM CHLORIDE, SODIUM LACTATE AND CALCIUM CHLORIDE: 600; 310; 30; 20 INJECTION, SOLUTION INTRAVENOUS at 08:10

## 2019-03-30 RX ADMIN — OMEPRAZOLE 20 MG: 20 CAPSULE, DELAYED RELEASE ORAL at 18:40

## 2019-03-30 RX ADMIN — HEPARIN SODIUM 5000 UNITS: 5000 INJECTION, SOLUTION INTRAVENOUS; SUBCUTANEOUS at 14:15

## 2019-03-30 RX ADMIN — SENNOSIDES, DOCUSATE SODIUM 2 TABLET: 50; 8.6 TABLET, FILM COATED ORAL at 18:40

## 2019-03-30 RX ADMIN — HEPARIN SODIUM 5000 UNITS: 5000 INJECTION, SOLUTION INTRAVENOUS; SUBCUTANEOUS at 22:11

## 2019-03-30 RX ADMIN — IPRATROPIUM BROMIDE AND ALBUTEROL SULFATE 3 ML: .5; 3 SOLUTION RESPIRATORY (INHALATION) at 19:59

## 2019-03-30 RX ADMIN — Medication 2 SPRAY: at 16:00

## 2019-03-30 RX ADMIN — ASPIRIN 325 MG: 325 TABLET, COATED ORAL at 06:14

## 2019-03-30 RX ADMIN — GABAPENTIN 200 MG: 100 CAPSULE ORAL at 06:14

## 2019-03-30 RX ADMIN — SODIUM CHLORIDE, POTASSIUM CHLORIDE, SODIUM LACTATE AND CALCIUM CHLORIDE: 600; 310; 30; 20 INJECTION, SOLUTION INTRAVENOUS at 18:40

## 2019-03-30 RX ADMIN — GABAPENTIN 200 MG: 100 CAPSULE ORAL at 18:40

## 2019-03-30 RX ADMIN — BUSPIRONE HYDROCHLORIDE 15 MG: 30 TABLET ORAL at 18:40

## 2019-03-30 RX ADMIN — MORPHINE SULFATE 4 MG: 4 INJECTION INTRAVENOUS at 01:40

## 2019-03-30 RX ADMIN — SENNOSIDES, DOCUSATE SODIUM 2 TABLET: 50; 8.6 TABLET, FILM COATED ORAL at 06:16

## 2019-03-30 RX ADMIN — BUSPIRONE HYDROCHLORIDE 15 MG: 30 TABLET ORAL at 11:32

## 2019-03-30 ASSESSMENT — ENCOUNTER SYMPTOMS
NAUSEA: 1
ABDOMINAL PAIN: 1
WEIGHT LOSS: 0
CHILLS: 0
VOMITING: 0
FEVER: 0

## 2019-03-30 ASSESSMENT — COPD QUESTIONNAIRES
DO YOU EVER COUGH UP ANY MUCUS OR PHLEGM?: NO/ONLY WITH OCCASIONAL COLDS OR INFECTIONS
COPD SCREENING SCORE: 5
DURING THE PAST 4 WEEKS HOW MUCH DID YOU FEEL SHORT OF BREATH: SOME OF THE TIME
HAVE YOU SMOKED AT LEAST 100 CIGARETTES IN YOUR ENTIRE LIFE: NO/DON'T KNOW

## 2019-03-30 ASSESSMENT — COGNITIVE AND FUNCTIONAL STATUS - GENERAL
DRESSING REGULAR LOWER BODY CLOTHING: A LITTLE
SUGGESTED CMS G CODE MODIFIER DAILY ACTIVITY: CJ
TOILETING: A LITTLE
DAILY ACTIVITIY SCORE: 21
HELP NEEDED FOR BATHING: A LITTLE

## 2019-03-30 ASSESSMENT — ACTIVITIES OF DAILY LIVING (ADL): TOILETING: INDEPENDENT

## 2019-03-30 ASSESSMENT — LIFESTYLE VARIABLES: EVER_SMOKED: NEVER

## 2019-03-30 NOTE — CONSULTS
DATE OF SERVICE:  03/28/2019    SURGICAL CONSULTATION    REQUESTING PHYSICIAN:  Guanako Clark MD    REASON FOR CONSULTATION:  The patient is an 78-year-old female who presented   to the emergency room with severe abdominal pain for several days.  She   apparently has a history of seizure and CVA several years ago.  She complained   of her back and diffuse abdominal pain after eating.  She also had nausea.    She has been feeling poorly for approximately 4-5 days.  She does have a prior   history of having her gallbladder removed.  Workup in the emergency room   found to have a white count of 20,000.  Electrolytes and liver functions are   normal.  Her amylase was 4000, her lipase is 4000 and a CT scan done   demonstrated small bowel dilatation and also evidence of acute pancreatitis.    I have been asked to see her in regards to this.    PAST MEDICAL HISTORY:    ILLNESSES:  History of anxiety, depression, hypertension, and stroke.    PAST SURGICAL HISTORY:  Laparoscopic cholecystectomy, cataract surgery, and   exploratory celiotomy with hiatal hernia repair.    MEDICATIONS:  Currently are Ecotrin, BuSpar, Dalmane, Flonase, Neurontin,   Atarax, Levaquin, Prinivil, Roxicodone, MiraLax.    ALLERGIES:  TO AMITRIPTYLINE, CIPROFLOXACIN, CODEINE, HYDRALAZINE, PENICILLIN,   POTASSIUM, XANAX, ZOLOFT, AND ZYRTEC.      SOCIAL HISTORY:  She is not a smoker or drinker.    REVIEW OF SYSTEMS:  Otherwise unremarkable.    PHYSICAL EXAMINATION:    GENERAL:  She is complaining of abdominal pain.  VITAL SIGNS:  Her blood pressure is 130/67.  Heart rate is in the 80s.  GENERAL:  She is alert and cooperative.  HEENT:  She is anicteric.  NECK:  Supple.  ABDOMEN:  Soft, but diffusely tender.  She has no palpable masses.  No   peritoneal signs.  EXTREMITIES:  Without edema.  NEUROLOGIC:  Intact.    Labs and CT as above.    IMPRESSION:  This is an 78-year-old female with acute pancreatitis of unknown   etiology with probable small  bowel ileus.  At this point, I do not feel any   surgical intervention is necessary.  I will follow along, however, with serial   examinations.       ____________________________________     MD ALEX SAHU / ROGER    DD:  03/30/2019 12:45:44  DT:  03/30/2019 13:14:38    D#:  [id number]  Job#:  [id number]

## 2019-03-30 NOTE — PROGRESS NOTES
Received report from day shift RN. Assumed patient care  AOx4  Pain rated at 5/10, declines intervention at this time  1 L of O2 via nasal cannula  Right triple lumen IJ in place, possible candidate for removal  NPO, no complaints of nausea  +void  Call light within reach  Bed locked and in low position  POC discussed with patient  All needs met at this time.

## 2019-03-30 NOTE — PROGRESS NOTES
Hospital Medicine Daily Progress Note    Date of Service  3/30/2019    Chief Complaint  78 y.o. female admitted 3/28/2019 with acute pancreatitis, and ileus versus small bowel obstruction    Hospital Course    Patient presented with abdominal pain and distention that radiates to her back.  She had associated nausea and vomiting.  She does report ingesting Demerol prior to arrival.  She has had multiple prior abdominal surgeries including cholecystectomy, Nissen fundoplication and takedown of multiple adhesions.  CT CTA thoracoabdominal aorta again redemonstrated pancreatitis and showed near diffuse small bowel dilatation with nondilated terminal ileum.  Differential included obstruction versus ileus related to pancreatitis.  Transition point was not identified.  General surgery consultation was obtained with instructions to place an NG tube.  However after multiple attempts this was unsuccessful.  The patient was transferred to the medical floor and kept strictly n.p.o.  She was given IV blood pressure support, IV PPI.  Her abdomen is less distended and tender today.  She is burping and belching regularly.  She was evaluated again by general surgery who feels given her esophageal history and improvement today replacement of the NG tube is not warranted.  She is not a surgical candidate at present.  We will continue medical management.      Interval Problem Update  Abdominal pain--stable, +BS, mild distention still  Agitation --- stream-of-conscious verbally; A/O x 4  Anemia --- 11/29  Leukocytosis --- 18 - declining  Blood cultures no growth to date  Elevated Lactic acid - resolved    Consultants/Specialty  General surgery --- s/o    Code Status  Full    Disposition  Home likely    Review of Systems  Review of Systems   Constitutional: Negative for chills, fever, malaise/fatigue and weight loss.   Gastrointestinal: Positive for abdominal pain and nausea. Negative for vomiting.   Genitourinary: Negative for dysuria,  frequency and urgency.        Physical Exam  Temp:  [36.4 °C (97.5 °F)-37.4 °C (99.3 °F)] 36.4 °C (97.5 °F)  Pulse:  [86-98] 86  Resp:  [16-20] 18  BP: (128-163)/(48-89) 131/65  SpO2:  [92 %-96 %] 95 %    Physical Exam   Constitutional: She is oriented to person, place, and time. She appears well-developed and well-nourished. No distress.   HENT:   Head: Normocephalic and atraumatic.   Eyes: Pupils are equal, round, and reactive to light. EOM are normal.   Neck: Neck supple.   Cardiovascular: Normal rate, regular rhythm and normal heart sounds.    Pulmonary/Chest: Effort normal and breath sounds normal. No respiratory distress. She has no wheezes. She has no rales.   Abdominal: Soft. She exhibits distension. Bowel sounds are decreased. There is generalized tenderness.   Neurological: She is alert and oriented to person, place, and time. No cranial nerve deficit.   Skin: Skin is warm and dry.       Fluids    Intake/Output Summary (Last 24 hours) at 03/30/19 1012  Last data filed at 03/30/19 0900   Gross per 24 hour   Intake             1440 ml   Output              300 ml   Net             1140 ml       Laboratory  Recent Labs      03/28/19   0400  03/29/19   1012  03/30/19   0352   WBC  20.1*  20.8*  18.3*   RBC  5.26  3.66*  3.12*   HEMOGLOBIN  15.3  12.0  11.1*   HEMATOCRIT  47.7*  34.6*  29.8*   MCV  90.7  94.5  95.5   MCH  29.1  32.8  35.6*   MCHC  32.1*  34.7  37.2*   RDW  46.2  49.0  49.6   PLATELETCT  209  197  164   MPV  11.0  10.6  9.8     Recent Labs      03/28/19   0400  03/29/19   1012  03/30/19   0352   SODIUM  141  142  141   POTASSIUM  4.1  4.1  3.8   CHLORIDE  106  112  111   CO2  24  24  25   GLUCOSE  182*  133*  119*   BUN  24*  16  14   CREATININE  1.15  0.74  0.78   CALCIUM  9.3  8.0*  8.2*     Recent Labs      03/28/19   0400   APTT  21.3*   INR  0.97     Recent Labs      03/28/19   0400   BNPBTYPENAT  11     Recent Labs      03/28/19   0820   TRIGLYCERIDE  89   HDL  48   LDL  67        Imaging  DX-CHEST-PORTABLE (1 VIEW)   Final Result      Right central line projects over the superior cavoatrial junction. No pneumothorax.      Stable elevation of the right hemidiaphragm.      Bibasilar atelectasis.      Stable prominence of the cardiomediastinal silhouette.      Atherosclerotic plaque.         US-RUQ   Final Result         1. Limited study due to poor acoustic windows and excessive bowel gas.   2. Hepatic steatosis.   3. Cholecystectomy.      CT-CTA COMPLETE THORACOABDOMINAL AORTA   Final Result         1. No aortic aneurysm or dissection. No pulmonary embolus.   2. Findings suggestive of acute interstitial edematous pancreatitis. Correlate with lipase.   3. Near diffuse small bowel dilatation with nondilated terminal ileum. This may be due to low-grade obstruction, or due to ileus related to pancreatitis. Transition point is not identified.   4. Stable tiny pulmonary nodules, overall unchanged since 2014 study, benign.      CT-HEAD W/O   Final Result      No noncontrast CT evidence of acute intracranial hemorrhage.      Stable nonspecific white matter changes and cerebral volume loss.           Assessment/Plan  * Small bowel obstruction (HCC)   Assessment & Plan    SBO VS ileus from pancreaitis   IVF, anti emetics   Dr. Gomez General surgery consulted   NGT unable to be placed at bedside, through IR or via endoscopy  Abd w +BS and improving clinically  GS recommends again tube given above  3/30 Advance to clears, mobilize patient, decr IVF     Anxiety- (present on admission)   Assessment & Plan    Stop Ativan given possible delirium  Resume buspar  Decr interruptions at night  Up to chairs w meals  Amb TID       Depression- (present on admission)   Assessment & Plan    On buspar      History of stroke- (present on admission)   Assessment & Plan    Hx of      GERD (gastroesophageal reflux disease)- (present on admission)   Assessment & Plan    Hx of   3/30: IV PPI switch to PO          VTE  prophylaxis: SCDs

## 2019-03-30 NOTE — THERAPY
"Occupational Therapy Evaluation completed.   Functional Status:    77 y/o female admitted with pancreatitis. Pt completed sup>sit with SBA with HOB elevated. Sit><stand with CGA. Pt ambulated to the bathroom without AD, CGA for safety and facilitation - pt uses SPC at home, but did not want to use FWW at this time, despite wall walking/furniture surfing. Once in the bathroom, pt brushed her hair and teeth with CGA for stability. She then returned to her room and sat in the chair with SBA. Pt was able to doff her L sock with supv and extra time, but required to Min A to put it back on. Pt very wheezy throughout session, but maintained O2 levels between 88-94 or RA. Pt reportedly lives alone and would likely benefit from HH. Anticipate pt would be better with AD, defer to PT for recs.    Plan of Care: Will benefit from Occupational Therapy 3 times per week  Discharge Recommendations:  Equipment: Will Continue to Assess for Equipment Needs. Post-acute therapy Recommend home health or outpatient transitional care services for continued occupational therapy services      See \"Rehab Therapy-Acute\" Patient Summary Report for complete documentation.    "

## 2019-03-30 NOTE — PROGRESS NOTES
Trauma / Surgical Daily Progress Note    Date of Service  3/30/2019    Chief Complaint  78 y.o. female admitted 3/28/2019 with Pancreatitis    Interval Events  Pain  Better. Labs better yesterday. Start clears. Mobilize and decrease fluids. Will sign off..    Review of Systems  Review of Systems   Gastrointestinal: Positive for abdominal pain.        Vital Signs  Temp:  [36.4 °C (97.5 °F)-37.4 °C (99.3 °F)] 36.4 °C (97.5 °F)  Pulse:  [86-98] 86  Resp:  [16-20] 18  BP: (128-163)/(48-89) 131/65  SpO2:  [92 %-96 %] 95 %    Physical Exam  Physical Exam   Constitutional: She appears well-developed.   HENT:   Head: Normocephalic.   Neck: Neck supple.   Cardiovascular: Normal rate.    Pulmonary/Chest: Effort normal.   Abdominal: Soft. She exhibits no distension. There is tenderness.   Less distended today   Musculoskeletal: Normal range of motion.   Neurological: She is alert.   Skin: Skin is warm.       Laboratory  Recent Results (from the past 24 hour(s))   Comp Metabolic Panel    Collection Time: 03/29/19 10:12 AM   Result Value Ref Range    Sodium 142 135 - 145 mmol/L    Potassium 4.1 3.6 - 5.5 mmol/L    Chloride 112 96 - 112 mmol/L    Co2 24 20 - 33 mmol/L    Anion Gap 6.0 0.0 - 11.9    Glucose 133 (H) 65 - 99 mg/dL    Bun 16 8 - 22 mg/dL    Creatinine 0.74 0.50 - 1.40 mg/dL    Calcium 8.0 (L) 8.5 - 10.5 mg/dL    AST(SGOT) 33 12 - 45 U/L    ALT(SGPT) 34 2 - 50 U/L    Alkaline Phosphatase 65 30 - 99 U/L    Total Bilirubin 0.5 0.1 - 1.5 mg/dL    Albumin 2.9 (L) 3.2 - 4.9 g/dL    Total Protein 5.9 (L) 6.0 - 8.2 g/dL    Globulin 3.0 1.9 - 3.5 g/dL    A-G Ratio 1.0 g/dL   LIPASE    Collection Time: 03/29/19 10:12 AM   Result Value Ref Range    Lipase 423 (H) 11 - 82 U/L   ESTIMATED GFR    Collection Time: 03/29/19 10:12 AM   Result Value Ref Range    GFR If African American >60 >60 mL/min/1.73 m 2    GFR If Non African American >60 >60 mL/min/1.73 m 2   CBC WITH DIFFERENTIAL    Collection Time: 03/29/19 10:12 AM   Result  Value Ref Range    WBC 20.8 (H) 4.8 - 10.8 K/uL    RBC 3.66 (L) 4.20 - 5.40 M/uL    Hemoglobin 12.0 12.0 - 16.0 g/dL    Hematocrit 34.6 (L) 37.0 - 47.0 %    MCV 94.5 81.4 - 97.8 fL    MCH 32.8 27.0 - 33.0 pg    MCHC 34.7 33.6 - 35.0 g/dL    RDW 49.0 35.9 - 50.0 fL    Platelet Count 197 164 - 446 K/uL    MPV 10.6 9.0 - 12.9 fL    Neutrophils-Polys 87.00 (H) 44.00 - 72.00 %    Lymphocytes 6.90 (L) 22.00 - 41.00 %    Monocytes 4.70 0.00 - 13.40 %    Eosinophils 0.00 0.00 - 6.90 %    Basophils 0.70 0.00 - 1.80 %    Immature Granulocytes 0.70 0.00 - 0.90 %    Nucleated RBC 0.10 /100 WBC    Neutrophils (Absolute) 18.10 (H) 2.00 - 7.15 K/uL    Lymphs (Absolute) 1.44 1.00 - 4.80 K/uL    Monos (Absolute) 0.98 (H) 0.00 - 0.85 K/uL    Eos (Absolute) 0.01 0.00 - 0.51 K/uL    Baso (Absolute) 0.14 (H) 0.00 - 0.12 K/uL    Immature Granulocytes (abs) 0.15 (H) 0.00 - 0.11 K/uL    NRBC (Absolute) 0.03 K/uL   CBC with Differential    Collection Time: 03/30/19  3:52 AM   Result Value Ref Range    WBC 18.3 (H) 4.8 - 10.8 K/uL    RBC 3.12 (L) 4.20 - 5.40 M/uL    Hemoglobin 11.1 (L) 12.0 - 16.0 g/dL    Hematocrit 29.8 (L) 37.0 - 47.0 %    MCV 95.5 81.4 - 97.8 fL    MCH 35.6 (H) 27.0 - 33.0 pg    MCHC 37.2 (H) 33.6 - 35.0 g/dL    RDW 49.6 35.9 - 50.0 fL    Platelet Count 164 164 - 446 K/uL    MPV 9.8 9.0 - 12.9 fL    Neutrophils-Polys 82.30 (H) 44.00 - 72.00 %    Lymphocytes 11.50 (L) 22.00 - 41.00 %    Monocytes 4.90 0.00 - 13.40 %    Eosinophils 0.20 0.00 - 6.90 %    Basophils 0.40 0.00 - 1.80 %    Immature Granulocytes 0.70 0.00 - 0.90 %    Nucleated RBC 0.00 /100 WBC    Neutrophils (Absolute) 15.02 (H) 2.00 - 7.15 K/uL    Lymphs (Absolute) 2.09 1.00 - 4.80 K/uL    Monos (Absolute) 0.90 (H) 0.00 - 0.85 K/uL    Eos (Absolute) 0.04 0.00 - 0.51 K/uL    Baso (Absolute) 0.08 0.00 - 0.12 K/uL    Immature Granulocytes (abs) 0.12 (H) 0.00 - 0.11 K/uL    NRBC (Absolute) 0.00 K/uL   Comp Metabolic Panel (CMP)    Collection Time: 03/30/19  3:52 AM    Result Value Ref Range    Sodium 141 135 - 145 mmol/L    Potassium 3.8 3.6 - 5.5 mmol/L    Chloride 111 96 - 112 mmol/L    Co2 25 20 - 33 mmol/L    Anion Gap 5.0 0.0 - 11.9    Glucose 119 (H) 65 - 99 mg/dL    Bun 14 8 - 22 mg/dL    Creatinine 0.78 0.50 - 1.40 mg/dL    Calcium 8.2 (L) 8.5 - 10.5 mg/dL    AST(SGOT) 27 12 - 45 U/L    ALT(SGPT) 28 2 - 50 U/L    Alkaline Phosphatase 60 30 - 99 U/L    Total Bilirubin 0.7 0.1 - 1.5 mg/dL    Albumin 3.1 (L) 3.2 - 4.9 g/dL    Total Protein 5.5 (L) 6.0 - 8.2 g/dL    Globulin 2.4 1.9 - 3.5 g/dL    A-G Ratio 1.3 g/dL   LACTIC ACID    Collection Time: 03/30/19  3:52 AM   Result Value Ref Range    Lactic Acid 0.7 0.5 - 2.0 mmol/L   ESTIMATED GFR    Collection Time: 03/30/19  3:52 AM   Result Value Ref Range    GFR If African American >60 >60 mL/min/1.73 m 2    GFR If Non African American >60 >60 mL/min/1.73 m 2       Fluids    Intake/Output Summary (Last 24 hours) at 03/30/19 0956  Last data filed at 03/30/19 0900   Gross per 24 hour   Intake             1440 ml   Output              300 ml   Net             1140 ml       Core Measures & Quality Metrics  Labs reviewed, Medications reviewed and Radiology images reviewed  Myers catheter: No Myers  Central line in place: Need for access          Antibiotics: Treating active infection/contamination beyond 24 hours perioperative coverage      AKILA Score  ETOH Screening    Assessment/Plan  No new Assessment & Plan notes have been filed under this hospital service since the last note was generated.  Service: Surgery General      Discussed patient condition with RN and Patient.  CRITICAL CARE TIME EXCLUDING PROCEDURES: 20    minutes

## 2019-03-30 NOTE — PROGRESS NOTES
"Assumed care of patient. Patient initially sleeping but was awoken instantly begins stating \"See?? I'm having trouble with my speech and throat. I told you yesterday I was having a stroke.\" Patient with no signs of stroke but is anxious. RN performed stroke assessment and ambulated patient to bathroom. RN reassured patient. Patient NPO. Abdomen is slightly softer than yesterday. No other needs at this time. Call light within reach.   "

## 2019-03-30 NOTE — CARE PLAN
Problem: Pain Management  Goal: Pain level will decrease to patient's comfort goal  Outcome: PROGRESSING AS EXPECTED  PRN medication adequately reduces patient's pain     Problem: Psychosocial Needs:  Goal: Level of anxiety will decrease  Outcome: PROGRESSING AS EXPECTED  Patient encouraged to express feelings to reduce anxiety

## 2019-03-30 NOTE — PROGRESS NOTES
Patient ambulated with CNA to bathroom. RN stepped into room to pass a medication when CNA called from bathroom stating patient had fallen. Patient states her vision went black, had ringing in her ears and became weak. CNA had assisted patient to floor. Both CNA and patient state she did not hit her head. Patient assisted to feet by RN and CNA. Patient complaining of left back/neck pain and right upper arm pain. Patient weak back to bed. VSS and patient alert and oriented. Dr. Rodriguez notified and x-ray orders received. Pharmacy notified and Charge RN notified.

## 2019-03-30 NOTE — CARE PLAN
Problem: Pain Management  Goal: Pain level will decrease to patient's comfort goal  Patient requesting IV Morphine for pain as needed.     Problem: Psychosocial Needs:  Goal: Level of anxiety will decrease  Patient and family anxious and requires much reassurance and conversation. Patient reluctant to be educated.

## 2019-03-31 ENCOUNTER — HOME HEALTH ADMISSION (OUTPATIENT)
Dept: HOME HEALTH SERVICES | Facility: HOME HEALTHCARE | Age: 79
End: 2019-03-31
Payer: MEDICARE

## 2019-03-31 PROBLEM — R33.9 URINARY RETENTION: Status: ACTIVE | Noted: 2019-03-31

## 2019-03-31 LAB
ALBUMIN SERPL BCP-MCNC: 2.7 G/DL (ref 3.2–4.9)
ALBUMIN/GLOB SERPL: 1.1 G/DL
ALP SERPL-CCNC: 64 U/L (ref 30–99)
ALT SERPL-CCNC: 22 U/L (ref 2–50)
ANION GAP SERPL CALC-SCNC: 4 MMOL/L (ref 0–11.9)
AST SERPL-CCNC: 21 U/L (ref 12–45)
BASOPHILS # BLD AUTO: 0.2 % (ref 0–1.8)
BASOPHILS # BLD: 0.03 K/UL (ref 0–0.12)
BILIRUB SERPL-MCNC: 0.6 MG/DL (ref 0.1–1.5)
BUN SERPL-MCNC: 11 MG/DL (ref 8–22)
CALCIUM SERPL-MCNC: 8 MG/DL (ref 8.5–10.5)
CHLORIDE SERPL-SCNC: 109 MMOL/L (ref 96–112)
CO2 SERPL-SCNC: 29 MMOL/L (ref 20–33)
CREAT SERPL-MCNC: 0.71 MG/DL (ref 0.5–1.4)
EKG IMPRESSION: NORMAL
EOSINOPHIL # BLD AUTO: 0.1 K/UL (ref 0–0.51)
EOSINOPHIL NFR BLD: 0.8 % (ref 0–6.9)
ERYTHROCYTE [DISTWIDTH] IN BLOOD BY AUTOMATED COUNT: 46.7 FL (ref 35.9–50)
GLOBULIN SER CALC-MCNC: 2.5 G/DL (ref 1.9–3.5)
GLUCOSE SERPL-MCNC: 109 MG/DL (ref 65–99)
HCT VFR BLD AUTO: 32.8 % (ref 37–47)
HGB BLD-MCNC: 10.7 G/DL (ref 12–16)
IMM GRANULOCYTES # BLD AUTO: 0.04 K/UL (ref 0–0.11)
IMM GRANULOCYTES NFR BLD AUTO: 0.3 % (ref 0–0.9)
LYMPHOCYTES # BLD AUTO: 1.36 K/UL (ref 1–4.8)
LYMPHOCYTES NFR BLD: 10.7 % (ref 22–41)
MCH RBC QN AUTO: 29.6 PG (ref 27–33)
MCHC RBC AUTO-ENTMCNC: 32.6 G/DL (ref 33.6–35)
MCV RBC AUTO: 90.9 FL (ref 81.4–97.8)
MONOCYTES # BLD AUTO: 0.73 K/UL (ref 0–0.85)
MONOCYTES NFR BLD AUTO: 5.7 % (ref 0–13.4)
NEUTROPHILS # BLD AUTO: 10.5 K/UL (ref 2–7.15)
NEUTROPHILS NFR BLD: 82.3 % (ref 44–72)
NRBC # BLD AUTO: 0 K/UL
NRBC BLD-RTO: 0 /100 WBC
PLATELET # BLD AUTO: 161 K/UL (ref 164–446)
PMV BLD AUTO: 9.8 FL (ref 9–12.9)
POTASSIUM SERPL-SCNC: 3.4 MMOL/L (ref 3.6–5.5)
PROT SERPL-MCNC: 5.2 G/DL (ref 6–8.2)
RBC # BLD AUTO: 3.61 M/UL (ref 4.2–5.4)
SODIUM SERPL-SCNC: 142 MMOL/L (ref 135–145)
WBC # BLD AUTO: 12.8 K/UL (ref 4.8–10.8)

## 2019-03-31 PROCEDURE — 700111 HCHG RX REV CODE 636 W/ 250 OVERRIDE (IP): Performed by: NURSE PRACTITIONER

## 2019-03-31 PROCEDURE — 80053 COMPREHEN METABOLIC PANEL: CPT

## 2019-03-31 PROCEDURE — 700102 HCHG RX REV CODE 250 W/ 637 OVERRIDE(OP): Performed by: NURSE PRACTITIONER

## 2019-03-31 PROCEDURE — A9270 NON-COVERED ITEM OR SERVICE: HCPCS | Performed by: HOSPITALIST

## 2019-03-31 PROCEDURE — 700102 HCHG RX REV CODE 250 W/ 637 OVERRIDE(OP): Performed by: HOSPITALIST

## 2019-03-31 PROCEDURE — 85025 COMPLETE CBC W/AUTO DIFF WBC: CPT

## 2019-03-31 PROCEDURE — 700105 HCHG RX REV CODE 258: Performed by: NURSE PRACTITIONER

## 2019-03-31 PROCEDURE — 700111 HCHG RX REV CODE 636 W/ 250 OVERRIDE (IP): Performed by: HOSPITALIST

## 2019-03-31 PROCEDURE — 99232 SBSQ HOSP IP/OBS MODERATE 35: CPT | Performed by: HOSPITALIST

## 2019-03-31 PROCEDURE — A9270 NON-COVERED ITEM OR SERVICE: HCPCS | Performed by: NURSE PRACTITIONER

## 2019-03-31 PROCEDURE — 770006 HCHG ROOM/CARE - MED/SURG/GYN SEMI*

## 2019-03-31 RX ORDER — OXYCODONE HYDROCHLORIDE 5 MG/1
5 TABLET ORAL
Status: DISCONTINUED | OUTPATIENT
Start: 2019-03-31 | End: 2019-04-01 | Stop reason: HOSPADM

## 2019-03-31 RX ORDER — MORPHINE SULFATE 4 MG/ML
1 INJECTION, SOLUTION INTRAMUSCULAR; INTRAVENOUS
Status: DISCONTINUED | OUTPATIENT
Start: 2019-03-31 | End: 2019-04-01 | Stop reason: HOSPADM

## 2019-03-31 RX ORDER — KETOROLAC TROMETHAMINE 30 MG/ML
30 INJECTION, SOLUTION INTRAMUSCULAR; INTRAVENOUS EVERY 6 HOURS PRN
Status: DISCONTINUED | OUTPATIENT
Start: 2019-03-31 | End: 2019-04-01 | Stop reason: HOSPADM

## 2019-03-31 RX ORDER — TAMSULOSIN HYDROCHLORIDE 0.4 MG/1
0.4 CAPSULE ORAL
Status: DISCONTINUED | OUTPATIENT
Start: 2019-03-31 | End: 2019-04-01 | Stop reason: HOSPADM

## 2019-03-31 RX ORDER — OXYCODONE HYDROCHLORIDE 10 MG/1
10 TABLET ORAL
Status: DISCONTINUED | OUTPATIENT
Start: 2019-03-31 | End: 2019-04-01 | Stop reason: HOSPADM

## 2019-03-31 RX ADMIN — SENNOSIDES, DOCUSATE SODIUM 2 TABLET: 50; 8.6 TABLET, FILM COATED ORAL at 06:34

## 2019-03-31 RX ADMIN — MAGNESIUM HYDROXIDE 30 ML: 400 SUSPENSION ORAL at 13:45

## 2019-03-31 RX ADMIN — OMEPRAZOLE 20 MG: 20 CAPSULE, DELAYED RELEASE ORAL at 06:35

## 2019-03-31 RX ADMIN — HEPARIN SODIUM 5000 UNITS: 5000 INJECTION, SOLUTION INTRAVENOUS; SUBCUTANEOUS at 06:33

## 2019-03-31 RX ADMIN — KETOROLAC TROMETHAMINE 30 MG: 30 INJECTION, SOLUTION INTRAMUSCULAR; INTRAVENOUS at 14:55

## 2019-03-31 RX ADMIN — BUSPIRONE HYDROCHLORIDE 15 MG: 30 TABLET ORAL at 16:38

## 2019-03-31 RX ADMIN — ASPIRIN 325 MG: 325 TABLET, COATED ORAL at 06:00

## 2019-03-31 RX ADMIN — GABAPENTIN 200 MG: 100 CAPSULE ORAL at 06:33

## 2019-03-31 RX ADMIN — BUSPIRONE HYDROCHLORIDE 15 MG: 30 TABLET ORAL at 11:29

## 2019-03-31 RX ADMIN — MORPHINE SULFATE 2 MG: 4 INJECTION INTRAVENOUS at 09:58

## 2019-03-31 RX ADMIN — BUSPIRONE HYDROCHLORIDE 15 MG: 30 TABLET ORAL at 06:34

## 2019-03-31 RX ADMIN — SODIUM CHLORIDE, POTASSIUM CHLORIDE, SODIUM LACTATE AND CALCIUM CHLORIDE: 600; 310; 30; 20 INJECTION, SOLUTION INTRAVENOUS at 06:51

## 2019-03-31 RX ADMIN — MORPHINE SULFATE 1 MG: 4 INJECTION INTRAVENOUS at 16:38

## 2019-03-31 RX ADMIN — POTASSIUM CHLORIDE: 2 INJECTION, SOLUTION, CONCENTRATE INTRAVENOUS at 08:30

## 2019-03-31 RX ADMIN — TAMSULOSIN HYDROCHLORIDE 0.4 MG: 0.4 CAPSULE ORAL at 11:29

## 2019-03-31 RX ADMIN — GABAPENTIN 200 MG: 100 CAPSULE ORAL at 16:38

## 2019-03-31 RX ADMIN — HEPARIN SODIUM 5000 UNITS: 5000 INJECTION, SOLUTION INTRAVENOUS; SUBCUTANEOUS at 21:34

## 2019-03-31 RX ADMIN — HEPARIN SODIUM 5000 UNITS: 5000 INJECTION, SOLUTION INTRAVENOUS; SUBCUTANEOUS at 13:45

## 2019-03-31 RX ADMIN — OMEPRAZOLE 20 MG: 20 CAPSULE, DELAYED RELEASE ORAL at 16:38

## 2019-03-31 ASSESSMENT — ENCOUNTER SYMPTOMS
WEIGHT LOSS: 0
VOMITING: 0
NAUSEA: 0
CHILLS: 0
FEVER: 0
ABDOMINAL PAIN: 1

## 2019-03-31 NOTE — PROGRESS NOTES
Pt complained of bladder distention and feeling unable to void except in small amounts. Pt was bladder scanned per unit protocol.   0800 Post void Bladder Scan was 553 ml   Dr Rodriguez was updated on bladder scan results. One time order for straight cath was received.   Pt attempted to void again and failed.  1000 pt was straight cathed for 600 ml. Pt denies any discomfort at this time.

## 2019-03-31 NOTE — DISCHARGE PLANNING
We are currently verifying MD acceptance of your patient. This will be resolved ASAP. If you want this escalated for a faster response please call [phone] and press option #2. Thank you for your patience.     Respectfully,   [facility]

## 2019-03-31 NOTE — PROGRESS NOTES
"At 1915 staff saw patient at bed 1's bed with IV tubing pulled taught from [facility]. Patient with eyes closed and stating \"I feel sick\". Patient began to collapse and four staff members assisted patient back to bed. Initial BP was 78/46 and is now trending up to 110/44. Patient with now, slow choppy speech, speaking in broken sentences and with eyes closed. Dr. Corey paged. On return call he was thoroughly educated on patient condition. No new orders received. MD stated he will monitor and we should call back if pressures remain low.   Rapid RN called and she called official rapid due to mid chest pain and wheezing with breathing.  "

## 2019-03-31 NOTE — FACE TO FACE
Face to Face Supporting Documentation - Home Health    The encounter with this patient was in whole or in part the primary reason for home health admission.    Date of encounter:   Patient:                    MRN:                       YOB: 2021  Luz Maria Saavedra  [id number]  1940     Home health to see patient for:  Skilled Nursing care for assessment, interventions & education, Home health aide, Physical Therapy evaluation and treatment and Occupational therapy evaluation and treatment    Skilled need for:  New Onset Medical Diagnosis ileus, pancreatitis, urinary retention    Skilled nursing interventions to include:  Comment: Diet education; med management    Homebound status evidenced by:  Need the aid of supportive devices such as crutches, canes, wheelchairs or walkers or Needs the assistance of another person in order to leave the home. Leaving home requires a considerable and taxing effort. There is a normal inability to leave the home.    Community Physician to provide follow up care: William Coleman M.D.     Optional Interventions? No      I certify the face to face encounter for this home health care referral meets the CMS requirements and the encounter/clinical assessment with the patient was, in whole, or in part, for the medical condition(s) listed above, which is the primary reason for home health care. Based on my clinical findings: the service(s) are medically necessary, support the need for home health care, and the homebound criteria are met.  I certify that this patient has had a face to face encounter by myself.  AMANDA Menchaca. - NPI: [id number]

## 2019-03-31 NOTE — CARE PLAN
Problem: Safety  Goal: Will remain free from falls  Outcome: PROGRESSING AS EXPECTED  Moderate fall risk, bed alarm on    Problem: Pain Management  Goal: Pain level will decrease to patient's comfort goal  Outcome: PROGRESSING AS EXPECTED  Pain at a comfortable level for pain at this time. PRNs available

## 2019-03-31 NOTE — PROGRESS NOTES
Bedside Report received   Assumed care of patient at 0700.    Pt is A&O x 4.  Pain reported at 9/10. Medicated per MAR  Nausea denied except with some oral medication per pt  Tolerating Diet   BS are present but hypoactive x 4 Q. Pt's abd is distended and tender.   + Urine output in small amounts   + BM    + Flatus  Up x 1 assistance  Son, Enio, updated on POC via phone at pt request.   Bed in lowest position and locked.  Bed alarm in place and on  Pt resting comfortably now.  Review plan of care with patient  Call light within reach  Hourly rounds in place  All needs met at this time

## 2019-03-31 NOTE — CARE PLAN
Problem: Communication  Goal: The ability to communicate needs accurately and effectively will improve  Outcome: PROGRESSING SLOWER THAN EXPECTED  Pt was educated on need to clarify s/s from past weeks versus s/s that are occurring at this time. Pt verbalizes understanding but quickly reverts back to describing past s/s when asked about current s/s. Re-education is completed PRN.     Problem: Pain Management  Goal: Pain level will decrease to patient's comfort goal  Outcome: PROGRESSING SLOWER THAN EXPECTED  Pt was educated on need to attempt to cover pain with oral medication prior to using IV pain medication. Pt was educated on rationale and MD orders. Pt verbalizes understanding, but refuses oral medication.

## 2019-03-31 NOTE — PROGRESS NOTES
Dr. Corey updated regarding chest xray results, EKG results, and patient's improved mental status post nebulizer treatment, new orders received for RT protocol

## 2019-03-31 NOTE — DISCHARGE PLANNING
Received Choice form at 0296  Agency/Facility Name: [facility]  Referral sent per Choice form @ 5509

## 2019-03-31 NOTE — DISCHARGE PLANNING
Anticipated Discharge Disposition: Home with home health.    Action: The patient has an order for home health.  She chose [facility] and signed the choice form, which was faxed to the [facility].    Barriers to Discharge: Medical clearance and needs to be accepted by [facility] before discharging.    Plan: Await medical clearance and acceptance by [facility].

## 2019-03-31 NOTE — PROGRESS NOTES
Hospital Medicine Daily Progress Note    Date of Service  3/31/2019    Chief Complaint  78 y.o. female admitted 3/28/2019 with acute pancreatitis, and ileus versus small bowel obstruction    Hospital Course    Patient presented with abdominal pain and distention that radiates to her back.  She had associated nausea and vomiting.  She does report ingesting Demerol prior to arrival.  She has had multiple prior abdominal surgeries including cholecystectomy, Nissen fundoplication and takedown of multiple adhesions.  CT CTA thoracoabdominal aorta again redemonstrated pancreatitis and showed near diffuse small bowel dilatation with nondilated terminal ileum.  Differential included obstruction versus ileus related to pancreatitis.  Transition point was not identified.  General surgery consultation was obtained with instructions to place an NG tube.  However after multiple attempts this was unsuccessful.  The patient was transferred to the medical floor and kept strictly n.p.o.  She was given IV blood pressure support, IV PPI.  Her abdomen is less distended and tender today.  She is burping and belching regularly.  She was evaluated again by general surgery who feels given her esophageal history and improvement today replacement of the NG tube is not warranted.  She is not a surgical candidate at present.     3/31 She fell when using the bathroom w CNA on 3/30. Says her vision went black. EKG benign. CXR benign. Xray C-spine and humerus neg. Later, she was found to have urinary retention and required straight cath x 1. She was started on Flomax. Attempted to refer to SNF but she refuses. Says she is going home from hospital. Slowly attempting to advance diet.      Interval Problem Update  Abdominal pain--stable, +BS, mild distention still; says she doesn't want to take clears because it doesn't taste good  Agitation --- upset with nursing care, quality of the food; A/O x 4  Attempted to ambulate on her own and fell; Xrays  humerus/C-spine/chest neg for fx  Anemia --- stable  Leukocytosis --- 12  Hypokalemia ---3.4  Blood cultures no growth to date  Urinary retention --- 553    Consultants/Specialty  General surgery --- s/o    Code Status  Full    Disposition  Home w HH. Refusing rehab/snf. F2F and Order for HH placed.    Review of Systems  Review of Systems   Constitutional: Negative for chills, fever, malaise/fatigue and weight loss.   Cardiovascular: Positive for chest pain.   Gastrointestinal: Positive for abdominal pain. Negative for nausea and vomiting.   Genitourinary: Negative for dysuria, frequency and urgency.        Urinary retention        Physical Exam  Temp:  [36.6 °C (97.9 °F)-37 °C (98.6 °F)] 36.8 °C (98.3 °F)  Pulse:  [69-89] 73  Resp:  [16-24] 20  BP: (108-155)/(42-66) 144/52  SpO2:  [94 %-100 %] 100 %    Physical Exam   Constitutional: She is oriented to person, place, and time. She appears well-developed and well-nourished. No distress.   HENT:   Head: Normocephalic and atraumatic.   Eyes: Pupils are equal, round, and reactive to light. EOM are normal.   Neck: Neck supple.   Cardiovascular: Normal rate, regular rhythm and normal heart sounds.    Pulmonary/Chest: Effort normal and breath sounds normal. No respiratory distress. She has no wheezes. She has no rales.   Abdominal: Soft. She exhibits distension. Bowel sounds are decreased. There is no tenderness.   Neurological: She is alert and oriented to person, place, and time. No cranial nerve deficit.   Skin: Skin is warm and dry.       Fluids    Intake/Output Summary (Last 24 hours) at 03/31/19 1013  Last data filed at 03/31/19 0420   Gross per 24 hour   Intake             1140 ml   Output                0 ml   Net             1140 ml       Laboratory  Recent Labs      03/29/19   1012  03/30/19   0352  03/31/19   0649   WBC  20.8*  18.3*  12.8*   RBC  3.66*  3.12*  3.61*   HEMOGLOBIN  12.0  11.1*  10.7*   HEMATOCRIT  34.6*  29.8*  32.8*   MCV  94.5  95.5  90.9    MCH  32.8  35.6*  29.6   MCHC  34.7  37.2*  32.6*   RDW  49.0  49.6  46.7   PLATELETCT  197  164  161*   MPV  10.6  9.8  9.8     Recent Labs      03/29/19   1012  03/30/19   0352  03/31/19   0205   SODIUM  142  141  142   POTASSIUM  4.1  3.8  3.4*   CHLORIDE  112  111  109   CO2  24  25  29   GLUCOSE  133*  119*  109*   BUN  16  14  11   CREATININE  0.74  0.78  0.71   CALCIUM  8.0*  8.2*  8.0*                   Imaging  DX-CHEST-PORTABLE (1 VIEW)   Final Result      1.  Supportive tubing as described above.   2.  No other significant change from prior exam.            DX-CERVICAL SPINE-2 OR 3 VIEWS   Final Result      1.  No compression deformity or acute fracture identified.      2.  Moderate degenerative disc disease and facet arthropathy.      DX-HUMERUS 2+ RIGHT   Final Result      Negative RIGHT humerus series.      DX-CHEST-PORTABLE (1 VIEW)   Final Result      Right central line projects over the superior cavoatrial junction. No pneumothorax.      Stable elevation of the right hemidiaphragm.      Bibasilar atelectasis.      Stable prominence of the cardiomediastinal silhouette.      Atherosclerotic plaque.         US-RUQ   Final Result         1. Limited study due to poor acoustic windows and excessive bowel gas.   2. Hepatic steatosis.   3. Cholecystectomy.      CT-CTA COMPLETE THORACOABDOMINAL AORTA   Final Result         1. No aortic aneurysm or dissection. No pulmonary embolus.   2. Findings suggestive of acute interstitial edematous pancreatitis. Correlate with lipase.   3. Near diffuse small bowel dilatation with nondilated terminal ileum. This may be due to low-grade obstruction, or due to ileus related to pancreatitis. Transition point is not identified.   4. Stable tiny pulmonary nodules, overall unchanged since 2014 study, benign.      CT-HEAD W/O   Final Result      No noncontrast CT evidence of acute intracranial hemorrhage.      Stable nonspecific white matter changes and cerebral volume loss.            Assessment/Plan  * Small bowel obstruction (HCC)   Assessment & Plan    SBO VS ileus from pancreaitis   IVF, anti emetics   Dr. Gomez General surgery consulted   NGT unable to be placed at bedside, through IR or via endoscopy  Abd w +BS and improving clinically  GS recommends again tube given above  3/30 Advance to clears, mobilize patient, decr IVF  3/31 Advance to full if taking clears at lunch. Says she's not taking much PO because she dislikes the fluids here.     Anxiety- (present on admission)   Assessment & Plan    Stop Ativan given possible delirium  Resume buspar  Decr interruptions at night  Up to chairs w meals  PT/OT       Urinary retention   Assessment & Plan    3/31 bladder scan 553  Staight cath x 1   Start flomax       Depression- (present on admission)   Assessment & Plan    On buspar      Hypokalemia- (present on admission)   Assessment & Plan    Add K to LR  Follow     History of stroke- (present on admission)   Assessment & Plan    Hx of      GERD (gastroesophageal reflux disease)- (present on admission)   Assessment & Plan    Hx of   3/30: IV PPI switch to PO          VTE prophylaxis: SCDs

## 2019-03-31 NOTE — PROGRESS NOTES
Received report from day shift RN. Assumed patient care.  Patient is AOx4 and is more awake and coherent post rapid response. Declines need for pain medication at this time.  2 L of O2 via nasal cannula  Patient ambulating with 2 person assist with staggering gait  Voiding, LBM PTA  Patient encouraged to cough and deep breathe but has weak effort  Clear liquid diet. Patient's abdomen is semi firm, denies nausea/vomitting  Bed alarm on due to moderate fall risk, patient education provided  Call light within reach  Bed locked and in low position  POC discussed with patient  All needs met at this time

## 2019-04-01 ENCOUNTER — PATIENT OUTREACH (OUTPATIENT)
Dept: HEALTH INFORMATION MANAGEMENT | Facility: OTHER | Age: 79
End: 2019-04-01

## 2019-04-01 ENCOUNTER — APPOINTMENT (OUTPATIENT)
Dept: RADIOLOGY | Facility: MEDICAL CENTER | Age: 79
DRG: 388 | End: 2019-04-01
Attending: HOSPITALIST
Payer: MEDICARE

## 2019-04-01 VITALS
SYSTOLIC BLOOD PRESSURE: 154 MMHG | HEIGHT: 64 IN | WEIGHT: 179.01 LBS | OXYGEN SATURATION: 93 % | HEART RATE: 79 BPM | DIASTOLIC BLOOD PRESSURE: 70 MMHG | TEMPERATURE: 97.8 F | RESPIRATION RATE: 16 BRPM | BODY MASS INDEX: 30.56 KG/M2

## 2019-04-01 PROBLEM — K56.609 SMALL BOWEL OBSTRUCTION (HCC): Status: RESOLVED | Noted: 2019-03-28 | Resolved: 2019-04-01

## 2019-04-01 PROBLEM — R33.9 URINARY RETENTION: Status: RESOLVED | Noted: 2019-03-31 | Resolved: 2019-04-01

## 2019-04-01 LAB
ANION GAP SERPL CALC-SCNC: 5 MMOL/L (ref 0–11.9)
BUN SERPL-MCNC: 8 MG/DL (ref 8–22)
CALCIUM SERPL-MCNC: 7.6 MG/DL (ref 8.5–10.5)
CHLORIDE SERPL-SCNC: 109 MMOL/L (ref 96–112)
CO2 SERPL-SCNC: 29 MMOL/L (ref 20–33)
CREAT SERPL-MCNC: 0.71 MG/DL (ref 0.5–1.4)
ERYTHROCYTE [DISTWIDTH] IN BLOOD BY AUTOMATED COUNT: 46.1 FL (ref 35.9–50)
GLUCOSE SERPL-MCNC: 139 MG/DL (ref 65–99)
HCT VFR BLD AUTO: 31 % (ref 37–47)
HGB BLD-MCNC: 9.7 G/DL (ref 12–16)
MCH RBC QN AUTO: 27.7 PG (ref 27–33)
MCHC RBC AUTO-ENTMCNC: 31.3 G/DL (ref 33.6–35)
MCV RBC AUTO: 88.6 FL (ref 81.4–97.8)
PLATELET # BLD AUTO: 182 K/UL (ref 164–446)
PMV BLD AUTO: 10.3 FL (ref 9–12.9)
POTASSIUM SERPL-SCNC: 3.7 MMOL/L (ref 3.6–5.5)
RBC # BLD AUTO: 3.5 M/UL (ref 4.2–5.4)
SODIUM SERPL-SCNC: 143 MMOL/L (ref 135–145)
WBC # BLD AUTO: 8.4 K/UL (ref 4.8–10.8)

## 2019-04-01 PROCEDURE — 86038 ANTINUCLEAR ANTIBODIES: CPT

## 2019-04-01 PROCEDURE — 71045 X-RAY EXAM CHEST 1 VIEW: CPT

## 2019-04-01 PROCEDURE — 80048 BASIC METABOLIC PNL TOTAL CA: CPT

## 2019-04-01 PROCEDURE — 99239 HOSP IP/OBS DSCHRG MGMT >30: CPT | Performed by: HOSPITALIST

## 2019-04-01 PROCEDURE — 700111 HCHG RX REV CODE 636 W/ 250 OVERRIDE (IP): Performed by: HOSPITALIST

## 2019-04-01 PROCEDURE — 82787 IGG 1 2 3 OR 4 EACH: CPT | Mod: 91

## 2019-04-01 PROCEDURE — 700102 HCHG RX REV CODE 250 W/ 637 OVERRIDE(OP): Performed by: NURSE PRACTITIONER

## 2019-04-01 PROCEDURE — A9270 NON-COVERED ITEM OR SERVICE: HCPCS | Performed by: HOSPITALIST

## 2019-04-01 PROCEDURE — 36415 COLL VENOUS BLD VENIPUNCTURE: CPT

## 2019-04-01 PROCEDURE — 85027 COMPLETE CBC AUTOMATED: CPT

## 2019-04-01 PROCEDURE — 700102 HCHG RX REV CODE 250 W/ 637 OVERRIDE(OP): Performed by: HOSPITALIST

## 2019-04-01 PROCEDURE — A9270 NON-COVERED ITEM OR SERVICE: HCPCS | Performed by: NURSE PRACTITIONER

## 2019-04-01 PROCEDURE — 700111 HCHG RX REV CODE 636 W/ 250 OVERRIDE (IP): Performed by: NURSE PRACTITIONER

## 2019-04-01 PROCEDURE — 700105 HCHG RX REV CODE 258: Performed by: NURSE PRACTITIONER

## 2019-04-01 RX ORDER — LISINOPRIL 10 MG/1
10 TABLET ORAL
Status: DISCONTINUED | OUTPATIENT
Start: 2019-04-01 | End: 2019-04-01 | Stop reason: HOSPADM

## 2019-04-01 RX ORDER — ACETAMINOPHEN 325 MG/1
650 TABLET ORAL ONCE
Status: DISCONTINUED | OUTPATIENT
Start: 2019-04-01 | End: 2019-04-01

## 2019-04-01 RX ORDER — TAMSULOSIN HYDROCHLORIDE 0.4 MG/1
0.4 CAPSULE ORAL
Qty: 30 CAP | Refills: 1 | Status: SHIPPED | OUTPATIENT
Start: 2019-04-02

## 2019-04-01 RX ADMIN — BUSPIRONE HYDROCHLORIDE 15 MG: 30 TABLET ORAL at 06:45

## 2019-04-01 RX ADMIN — HEPARIN SODIUM 5000 UNITS: 5000 INJECTION, SOLUTION INTRAVENOUS; SUBCUTANEOUS at 06:41

## 2019-04-01 RX ADMIN — BUSPIRONE HYDROCHLORIDE 15 MG: 30 TABLET ORAL at 11:58

## 2019-04-01 RX ADMIN — GABAPENTIN 200 MG: 100 CAPSULE ORAL at 06:45

## 2019-04-01 RX ADMIN — KETOROLAC TROMETHAMINE 30 MG: 30 INJECTION, SOLUTION INTRAMUSCULAR; INTRAVENOUS at 10:02

## 2019-04-01 RX ADMIN — POTASSIUM CHLORIDE: 2 INJECTION, SOLUTION, CONCENTRATE INTRAVENOUS at 06:42

## 2019-04-01 RX ADMIN — LISINOPRIL 10 MG: 10 TABLET ORAL at 13:08

## 2019-04-01 RX ADMIN — TAMSULOSIN HYDROCHLORIDE 0.4 MG: 0.4 CAPSULE ORAL at 09:01

## 2019-04-01 RX ADMIN — OMEPRAZOLE 20 MG: 20 CAPSULE, DELAYED RELEASE ORAL at 06:45

## 2019-04-01 RX ADMIN — ASPIRIN 325 MG: 325 TABLET, COATED ORAL at 06:45

## 2019-04-01 ASSESSMENT — ENCOUNTER SYMPTOMS
ABDOMINAL PAIN: 0
WEIGHT LOSS: 0
NAUSEA: 0
FEVER: 0
VOMITING: 0
CHILLS: 0

## 2019-04-01 NOTE — DISCHARGE PLANNING
ATTN: Case Management  RE: Referral for Home Health    As of 04/01/2019, we have accepted the Home Health referral for the patient listed above.    A [facility] clinician will be out to see the patient within 48 hours. If you have any questions or concerns regarding the patient’s transition to Home Health, please do not hesitate to contact us at [phone].      We look forward to collaborating with you,  [facility] Home Health Team

## 2019-04-01 NOTE — PROGRESS NOTES
Pt discharged. Left by wheelchair accompanied by CNA and Son. Continues to feel anxious at times however is not in any distress. Discharge instructions provided to patient alongside of follow up appointments and medications, she stated understanding with no further questions. Requested the pneumo vaccine then refused it due to her time constraints, agrees to  follow up with primary care physician if she changes her mind. PIV removed.

## 2019-04-01 NOTE — PROGRESS NOTES
Received report from day shift RN. Assumed patient care  AOx4  Pain rated at 3/10, patient states this is manageable at this time  1 L of O2 via nasal cannula, patient encouraged to use IS  No complaints of nausea at this time, full liquid diet  +void  Patient is having multiple loose bowel movements  IV fluids infusing  Ambulates x1 with steady gait   Moderate fall risk, bed alarm on  Call light within reach  Bed locked and in low position   POC discussed with patient  All needs met at this time.

## 2019-04-01 NOTE — DISCHARGE SUMMARY
Discharge Summary    CHIEF COMPLAINT ON ADMISSION  Chief Complaint   Patient presents with   • Chest Pain   • Abdominal Pain       Reason for Admission  EMS     Admission Date  3/28/2019    CODE STATUS  Full Code    HPI & HOSPITAL COURSE  This is a 78 y.o. female here with abdominal pain and distention that radiates to her back.  She had associated nausea and vomiting.  She does report ingesting Demerol prior to arrival.  She has had multiple prior abdominal surgeries including cholecystectomy, Nissen fundoplication and takedown of multiple adhesions.  CT CTA thoracoabdominal aorta showed pancreatitis and near diffuse small bowel dilatation with nondilated terminal ileum.  Differential included obstruction versus ileus related to pancreatitis.  Transition point was not identified.  General surgery consultation was obtained with instructions to place an NG tube.  However after multiple attempts this was unsuccessful.  The patient was transferred to the medical floor and kept strictly n.p.o.  She was given IV blood pressure support, IV PPI, pain support, IVF, and tincture of time. Her abdomen is now nondistended and nontender.  She was evaluated again by general surgery who feels given her esophageal history and improvement today replacement of the NG tube was not warranted. She is not a surgical candidate. Ultimately, surgery signed off.    Unfortunately, the patient fell when using the bathroom w CNA on 3/30. She says her vision went black. EKG was benign. CXR was benign. Xray C-spine and humerus were negative for fracuture. Later, she was found to have urinary retention and required straight cath x 1. She was started on Flomax with successful return of normal voiding function.    Eventually the patient was started on a clear liquid diet and advance to full liquids.  The patient tolerated this well although did complain about the quality of the food and the foul taste.  Her pancreatitis completely resolved.   Triglycerides were normal.  Patient denies alcohol.  There is no evidence of obstructive gallstone pancreatitis.  Ultrasound of the abdomen showed hepatic steatosis and prior cholecystectomy.  Hepatic function tests at the time of discharge were completely normal with the exception of low albumin at 2.7.  She passed several bowel movements and was voiding normally.  She did have significant mobility limitation and weakness after her hospitalization.  I did refer her to a skilled nursing facility.  However the patient was adamantly refusing to transfer to the facility as she said these were unsafe and horrible places.  She says she is going home from hospital when cleared.     Patient was provided with a walker.  She was up to the chair with meals.  She was referred to home health which is accepted the patient. She is therefore discharged in fair and stable condition to home with organized home healthcare and close outpatient follow-up.    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  William Coleman M.D.  [address] [place] [id number]  [phone]    Go on 4/19/2019  Please arrive at 1:30 pm for a 1:45 pm appointment with your primary care provider       MEDICATIONS ON DISCHARGE     Medication List      START taking these medications      Instructions   tamsulosin 0.4 MG capsule  Start taking on:  4/2/2019  Commonly known as:  FLOMAX    "Take 1 Cap by mouth ONE-HALF HOUR AFTER BREAKFAST.  Dose:  0.4 mg        CONTINUE taking these medications      Instructions   aspirin EC 81 MG Tbec  Commonly known as:  ECOTRIN   Take 81 mg by mouth 2 Times a Day.  Dose:  81 mg     busPIRone 15 MG tablet  Commonly known as:  BUSPAR   Take 15 mg by mouth 3 times a day.  Dose:  15 mg     flurazepam 30 MG capsule  Commonly known as:  DALMANE   Take 30 mg by mouth at bedtime as needed for Sleep.  Dose:  30 mg     lisinopril 10 MG Tabs  Commonly known as:  PRINIVIL   Take 1 Tab by mouth every day. Indications: High Blood Pressure  Dose:  10 mg     LOTEMAX 0.5 % Gel  Generic drug:  Loteprednol Etabonate   Place 1 Drop in both eyes 3 times a day.  Dose:  1 Drop     omeprazole 20 MG delayed-release capsule  Commonly known as:  PRILOSEC   Take 20 mg by mouth every day.  Dose:  20 mg     vitamin D (Ergocalciferol) 04209 units Caps capsule  Commonly known as:  DRISDOL   Take 50,000 Units by mouth every 7 days. saturday  Dose:  21198 Units     ZOFRAN ODT 4 MG Tbdp  Generic drug:  ondansetron   Take 4 mg by mouth every 8 hours as needed for Nausea.  Dose:  4 mg        STOP taking these medications    hydrOXYzine HCl 25 MG Tabs  Commonly known as:  ATARAX            Allergies  Allergies   Allergen Reactions   • Amitriptyline    • Ciprofloxacin    • Codeine    • Hydralazine      Face swelling   • Other Drug      Pt states she is allergic to many antibiotics but she does not remember what they are.   • Pcn [Penicillins]    • Potassium Shortness of Breath     \"I feel like I can't breath\".  Has tolerate IV KCl in past   • Xanax [Alprazolam]    • Zoloft    • Zyrtec [Cetirizine]        DIET  Orders Placed This Encounter   Procedures   • Diet Order Full Liquid     Standing Status:   Standing     Number of Occurrences:   1     Order Specific Question:   Diet:     Answer:   Full Liquid [11]       ACTIVITY  As tolerated.  Weight bearing as " tolerated    CONSULTATIONS  GS    PROCEDURES  None    LABORATORY  Lab Results   Component Value Date    SODIUM 143 04/01/2019    POTASSIUM 3.7 04/01/2019    CHLORIDE 109 04/01/2019    CO2 29 04/01/2019    GLUCOSE 139 (H) 04/01/2019    BUN 8 04/01/2019    CREATININE 0.71 04/01/2019    CREATININE 0.9 11/19/2008        Lab Results   Component Value Date    WBC 8.4 04/01/2019    HEMOGLOBIN 9.7 (L) 04/01/2019    HEMATOCRIT 31.0 (L) 04/01/2019    PLATELETCT 182 04/01/2019        Total time of the discharge process exceeds 36 minutes.

## 2019-04-01 NOTE — DISCHARGE PLANNING
Anticipated Discharge Disposition: Home with HH    Action: Pt has been accepted by [facility].  This RN CM spoke with bedside RN.    Barriers to Discharge: none    Plan: No dc needs identified at this time.  Pending medical clearance pt to dc home with .

## 2019-04-01 NOTE — PROGRESS NOTES
AOx4 however slightly anxious. MD made aware in am rounds. Discussed concerns and fears with patient stated feelings of reassurance.  Pain expressed as mild, patient states this is manageable at this time  O2 sats >90% on room air, patient encouraged to use IS  No complaints of nausea at this time, full liquid diet  +void  IV fluids infusing  Ambulates x1 with steady gait   Moderate fall risk, bed alarm on, additional safety  education provided, states her understanding.  Call light within reach  Bed locked and in low position   POC discussed with patient  All needs met at this time.

## 2019-04-01 NOTE — CARE PLAN
Problem: Safety  Goal: Will remain free from falls  Outcome: PROGRESSING AS EXPECTED  Non skid socks on, patient's room close to nursing station, bed alarm on    Problem: Pain Management  Goal: Pain level will decrease to patient's comfort goal  Outcome: PROGRESSING AS EXPECTED  Declines need for pain medication at this time.

## 2019-04-01 NOTE — PROGRESS NOTES
[facility] Medicine Daily Progress Note    Date of Service  4/1/2019    Chief Complaint  78 y.o. female admitted 3/28/2019 with acute pancreatitis, and ileus versus small bowel obstruction    Hospital Course    Patient presented with abdominal pain and distention that radiates to her back.  She had associated nausea and vomiting.  She does report ingesting Demerol prior to arrival.  She has had multiple prior abdominal surgeries including cholecystectomy, Nissen fundoplication and takedown of multiple adhesions.  CT CTA thoracoabdominal aorta again redemonstrated pancreatitis and showed near diffuse small bowel dilatation with nondilated terminal ileum.  Differential included obstruction versus ileus related to pancreatitis.  Transition point was not identified.  General surgery consultation was obtained with instructions to place an NG tube.  However after multiple attempts this was unsuccessful.  The patient was transferred to the medical floor and kept strictly n.p.o.  She was given IV blood pressure support, IV PPI.  Her abdomen is less distended and tender today.  She is burping and belching regularly.  She was evaluated again by general surgery who feels given her esophageal history and improvement today replacement of the NG tube is not warranted.  She is not a surgical candidate at present.     3/31 She fell when using the bathroom w CNA on 3/30. Says her vision went black. EKG benign. CXR benign. Xray C-spine and humerus neg. Later, she was found to have urinary retention and required straight cath x 1. She was started on Flomax. Attempted to refer to SNF but she refuses. Says she is going home from hospital. 4/1 Adv diet to GI soft. Patient now refusing to go home. Has anxiety and c/o multiple symptoms. Thinks she's having a stroke. Neuro exam non-focal. Thinks she had a stroke in the past, but her brain MRI from 2016 is normal. CT head 3/28 normal. Severe anxiety.      Interval Problem Update  Abd soft, NT,  ND  Tolerating oral diet  Voiding  VSS and normal  Refuses SNF --- doesn't want to go home  Anxiety - reached out to son. Provided with walker    Consultants/Specialty  General surgery --- s/o    Code Status  Full    Disposition  Accepted w HH. Refusing rehab/snf. Afraid to go home.    Review of Systems  Review of Systems   Constitutional: Negative for chills, fever, malaise/fatigue and weight loss.   Cardiovascular: Negative for chest pain.   Gastrointestinal: Negative for abdominal pain, nausea and vomiting.   Genitourinary: Negative for dysuria, frequency and urgency.        Urinary retention        Physical Exam  Temp:  [36.4 °C (97.6 °F)-36.6 °C (97.8 °F)] 36.6 °C (97.8 °F)  Pulse:  [69-86] 79  Resp:  [16-20] 16  BP: (109-154)/(54-74) 154/70  SpO2:  [93 %-98 %] 93 %    Physical Exam   Constitutional: She is oriented to person, place, and time. She appears well-developed and well-nourished. No distress.   HENT:   Head: Normocephalic and atraumatic.   Eyes: Pupils are equal, round, and reactive to light. EOM are normal.   Neck: Neck supple.   Cardiovascular: Normal rate, regular rhythm and normal heart sounds.    Pulmonary/Chest: Effort normal and breath sounds normal. No respiratory distress. She has no wheezes. She has no rales.   Abdominal: Soft. Normal appearance and bowel sounds are normal. She exhibits no distension. There is no tenderness.   Neurological: She is alert and oriented to person, place, and time. No cranial nerve deficit.   Skin: Skin is warm and dry.       Fluids    Intake/Output Summary (Last 24 hours) at 04/01/19 1419  Last data filed at 04/01/19 0901   Gross per 24 hour   Intake             2020 ml   Output              800 ml   Net             1220 ml       Laboratory  Recent Labs      03/30/19   0352  03/31/19   0649  04/01/19   0406   WBC  18.3*  12.8*  8.4   RBC  3.12*  3.61*  3.50*   HEMOGLOBIN  11.1*  10.7*  9.7*   HEMATOCRIT  29.8*  32.8*  31.0*   MCV  95.5  90.9  88.6   MCH  35.6*   29.6  27.7   MCHC  37.2*  32.6*  31.3*   RDW  49.6  46.7  46.1   PLATELETCT  164  161*  182   MPV  9.8  9.8  10.3     Recent Labs      03/30/19   0352  03/31/19   0205  04/01/19   0406   SODIUM  141  142  143   POTASSIUM  3.8  3.4*  3.7   CHLORIDE  111  109  109   CO2  25  29  29   GLUCOSE  119*  109*  139*   BUN  14  11  8   CREATININE  0.78  0.71  0.71   CALCIUM  8.2*  8.0*  7.6*                   Imaging  DX-CHEST-LIMITED (1 VIEW)   Final Result         1.  Hazy left lung base opacities suggests infiltrates, stable   2.  Cardiomegaly   3.  Atherosclerosis      DX-CHEST-PORTABLE (1 VIEW)   Final Result      1.  Supportive tubing as described above.   2.  No other significant change from prior exam.            DX-CERVICAL SPINE-2 OR 3 VIEWS   Final Result      1.  No compression deformity or acute fracture identified.      2.  Moderate degenerative disc disease and facet arthropathy.      DX-HUMERUS 2+ RIGHT   Final Result      Negative RIGHT humerus series.      DX-CHEST-PORTABLE (1 VIEW)   Final Result      Right central line projects over the superior cavoatrial junction. No pneumothorax.      Stable elevation of the right hemidiaphragm.      Bibasilar atelectasis.      Stable prominence of the cardiomediastinal silhouette.      Atherosclerotic plaque.         US-RUQ   Final Result         1. Limited study due to poor acoustic windows and excessive bowel gas.   2. Hepatic steatosis.   3. Cholecystectomy.      CT-CTA COMPLETE THORACOABDOMINAL AORTA   Final Result         1. No aortic aneurysm or dissection. No pulmonary embolus.   2. Findings suggestive of acute interstitial edematous pancreatitis. Correlate with lipase.   3. Near diffuse small bowel dilatation with nondilated terminal ileum. This may be due to low-grade obstruction, or due to ileus related to pancreatitis. Transition point is not identified.   4. Stable tiny pulmonary nodules, overall unchanged since 2014 study, benign.      CT-HEAD W/O   Final  Result      No noncontrast CT evidence of acute intracranial hemorrhage.      Stable nonspecific white matter changes and cerebral volume loss.           Assessment/Plan  Anxiety- (present on admission)   Assessment & Plan    Stop Ativan given possible delirium  Resume buspar  Decr interruptions at night  Up to chairs w meals  PT/OT       Depression- (present on admission)   Assessment & Plan    On buspar      History of stroke- (present on admission)   Assessment & Plan    Hx of      GERD (gastroesophageal reflux disease)- (present on admission)   Assessment & Plan    Hx of   3/30: IV PPI switch to PO          VTE prophylaxis: SCDs

## 2019-04-02 ENCOUNTER — HOSPITAL ENCOUNTER (INPATIENT)
Facility: MEDICAL CENTER | Age: 79
LOS: 2 days | DRG: 069 | End: 2019-04-05
Attending: EMERGENCY MEDICINE | Admitting: HOSPITALIST
Payer: MEDICARE

## 2019-04-02 ENCOUNTER — APPOINTMENT (OUTPATIENT)
Dept: RADIOLOGY | Facility: MEDICAL CENTER | Age: 79
DRG: 069 | End: 2019-04-02
Attending: EMERGENCY MEDICINE
Payer: MEDICARE

## 2019-04-02 ENCOUNTER — PATIENT OUTREACH (OUTPATIENT)
Dept: HEALTH INFORMATION MANAGEMENT | Facility: OTHER | Age: 79
End: 2019-04-02

## 2019-04-02 DIAGNOSIS — K85.90 ACUTE PANCREATITIS, UNSPECIFIED COMPLICATION STATUS, UNSPECIFIED PANCREATITIS TYPE: ICD-10-CM

## 2019-04-02 DIAGNOSIS — R79.89 ELEVATED TROPONIN: ICD-10-CM

## 2019-04-02 DIAGNOSIS — G45.9 TIA (TRANSIENT ISCHEMIC ATTACK): ICD-10-CM

## 2019-04-02 DIAGNOSIS — I16.1 HYPERTENSIVE EMERGENCY: ICD-10-CM

## 2019-04-02 PROBLEM — R10.9 AP (ABDOMINAL PAIN): Status: ACTIVE | Noted: 2019-04-02

## 2019-04-02 LAB
ABO GROUP BLD: NORMAL
ALBUMIN SERPL BCP-MCNC: 3.3 G/DL (ref 3.2–4.9)
ALBUMIN/GLOB SERPL: 1.1 G/DL
ALP SERPL-CCNC: 109 U/L (ref 30–99)
ALT SERPL-CCNC: 23 U/L (ref 2–50)
ANION GAP SERPL CALC-SCNC: 9 MMOL/L (ref 0–11.9)
APPEARANCE UR: CLEAR
APTT PPP: 30.6 SEC (ref 24.7–36)
AST SERPL-CCNC: 23 U/L (ref 12–45)
BACTERIA #/AREA URNS HPF: NEGATIVE /HPF
BACTERIA BLD CULT: NORMAL
BACTERIA BLD CULT: NORMAL
BASOPHILS # BLD AUTO: 0.6 % (ref 0–1.8)
BASOPHILS # BLD: 0.05 K/UL (ref 0–0.12)
BILIRUB SERPL-MCNC: 0.7 MG/DL (ref 0.1–1.5)
BILIRUB UR QL STRIP.AUTO: NEGATIVE
BLD GP AB SCN SERPL QL: NORMAL
BUN SERPL-MCNC: 5 MG/DL (ref 8–22)
CALCIUM SERPL-MCNC: 8.6 MG/DL (ref 8.5–10.5)
CHLORIDE SERPL-SCNC: 104 MMOL/L (ref 96–112)
CO2 SERPL-SCNC: 27 MMOL/L (ref 20–33)
COLOR UR: YELLOW
CREAT SERPL-MCNC: 0.74 MG/DL (ref 0.5–1.4)
EKG IMPRESSION: NORMAL
EOSINOPHIL # BLD AUTO: 0.08 K/UL (ref 0–0.51)
EOSINOPHIL NFR BLD: 0.9 % (ref 0–6.9)
EPI CELLS #/AREA URNS HPF: NEGATIVE /HPF
ERYTHROCYTE [DISTWIDTH] IN BLOOD BY AUTOMATED COUNT: 43.6 FL (ref 35.9–50)
EST. AVERAGE GLUCOSE BLD GHB EST-MCNC: 131 MG/DL
GLOBULIN SER CALC-MCNC: 3 G/DL (ref 1.9–3.5)
GLUCOSE SERPL-MCNC: 135 MG/DL (ref 65–99)
GLUCOSE UR STRIP.AUTO-MCNC: NEGATIVE MG/DL
HBA1C MFR BLD: 6.2 % (ref 0–5.6)
HCT VFR BLD AUTO: 38.1 % (ref 37–47)
HGB BLD-MCNC: 12.6 G/DL (ref 12–16)
HYALINE CASTS #/AREA URNS LPF: NORMAL /LPF
IMM GRANULOCYTES # BLD AUTO: 0.13 K/UL (ref 0–0.11)
IMM GRANULOCYTES NFR BLD AUTO: 1.5 % (ref 0–0.9)
INR PPP: 1.12 (ref 0.87–1.13)
KETONES UR STRIP.AUTO-MCNC: ABNORMAL MG/DL
LEUKOCYTE ESTERASE UR QL STRIP.AUTO: ABNORMAL
LIPASE SERPL-CCNC: 54 U/L (ref 11–82)
LYMPHOCYTES # BLD AUTO: 1.29 K/UL (ref 1–4.8)
LYMPHOCYTES NFR BLD: 14.8 % (ref 22–41)
MCH RBC QN AUTO: 28.8 PG (ref 27–33)
MCHC RBC AUTO-ENTMCNC: 33.1 G/DL (ref 33.6–35)
MCV RBC AUTO: 87.2 FL (ref 81.4–97.8)
MICRO URNS: ABNORMAL
MONOCYTES # BLD AUTO: 0.52 K/UL (ref 0–0.85)
MONOCYTES NFR BLD AUTO: 6 % (ref 0–13.4)
NEUTROPHILS # BLD AUTO: 6.63 K/UL (ref 2–7.15)
NEUTROPHILS NFR BLD: 76.2 % (ref 44–72)
NITRITE UR QL STRIP.AUTO: NEGATIVE
NRBC # BLD AUTO: 0 K/UL
NRBC BLD-RTO: 0 /100 WBC
PH UR STRIP.AUTO: 8.5 [PH]
PLATELET # BLD AUTO: 221 K/UL (ref 164–446)
PMV BLD AUTO: 9.7 FL (ref 9–12.9)
POTASSIUM SERPL-SCNC: 3.3 MMOL/L (ref 3.6–5.5)
PROT SERPL-MCNC: 6.3 G/DL (ref 6–8.2)
PROT UR QL STRIP: NEGATIVE MG/DL
PROTHROMBIN TIME: 14.5 SEC (ref 12–14.6)
RBC # BLD AUTO: 4.37 M/UL (ref 4.2–5.4)
RBC # URNS HPF: NORMAL /HPF
RBC UR QL AUTO: ABNORMAL
RH BLD: NORMAL
SIGNIFICANT IND 70042: NORMAL
SIGNIFICANT IND 70042: NORMAL
SITE SITE: NORMAL
SITE SITE: NORMAL
SODIUM SERPL-SCNC: 140 MMOL/L (ref 135–145)
SOURCE SOURCE: NORMAL
SOURCE SOURCE: NORMAL
SP GR UR STRIP.AUTO: 1.02
TROPONIN I SERPL-MCNC: 0.17 NG/ML (ref 0–0.04)
TROPONIN I SERPL-MCNC: 0.49 NG/ML (ref 0–0.04)
UROBILINOGEN UR STRIP.AUTO-MCNC: 0.2 MG/DL
WBC # BLD AUTO: 8.7 K/UL (ref 4.8–10.8)
WBC #/AREA URNS HPF: NORMAL /HPF

## 2019-04-02 PROCEDURE — 83690 ASSAY OF LIPASE: CPT

## 2019-04-02 PROCEDURE — 0042T CT-CEREBRAL PERFUSION ANALYSIS: CPT

## 2019-04-02 PROCEDURE — 99284 EMERGENCY DEPT VISIT MOD MDM: CPT | Performed by: PSYCHIATRY & NEUROLOGY

## 2019-04-02 PROCEDURE — 700111 HCHG RX REV CODE 636 W/ 250 OVERRIDE (IP): Performed by: EMERGENCY MEDICINE

## 2019-04-02 PROCEDURE — 96374 THER/PROPH/DIAG INJ IV PUSH: CPT

## 2019-04-02 PROCEDURE — 80053 COMPREHEN METABOLIC PANEL: CPT

## 2019-04-02 PROCEDURE — 96376 TX/PRO/DX INJ SAME DRUG ADON: CPT

## 2019-04-02 PROCEDURE — 70498 CT ANGIOGRAPHY NECK: CPT

## 2019-04-02 PROCEDURE — 700111 HCHG RX REV CODE 636 W/ 250 OVERRIDE (IP): Performed by: HOSPITALIST

## 2019-04-02 PROCEDURE — G0378 HOSPITAL OBSERVATION PER HR: HCPCS

## 2019-04-02 PROCEDURE — 74176 CT ABD & PELVIS W/O CONTRAST: CPT

## 2019-04-02 PROCEDURE — 700101 HCHG RX REV CODE 250: Performed by: HOSPITALIST

## 2019-04-02 PROCEDURE — 85610 PROTHROMBIN TIME: CPT

## 2019-04-02 PROCEDURE — 86901 BLOOD TYPING SEROLOGIC RH(D): CPT

## 2019-04-02 PROCEDURE — 99220 PR INITIAL OBSERVATION CARE,LEVL III: CPT | Performed by: HOSPITALIST

## 2019-04-02 PROCEDURE — 86900 BLOOD TYPING SEROLOGIC ABO: CPT

## 2019-04-02 PROCEDURE — 700105 HCHG RX REV CODE 258: Performed by: NURSE PRACTITIONER

## 2019-04-02 PROCEDURE — 70496 CT ANGIOGRAPHY HEAD: CPT

## 2019-04-02 PROCEDURE — 85730 THROMBOPLASTIN TIME PARTIAL: CPT

## 2019-04-02 PROCEDURE — 700117 HCHG RX CONTRAST REV CODE 255: Performed by: EMERGENCY MEDICINE

## 2019-04-02 PROCEDURE — 71045 X-RAY EXAM CHEST 1 VIEW: CPT

## 2019-04-02 PROCEDURE — 93005 ELECTROCARDIOGRAM TRACING: CPT | Performed by: EMERGENCY MEDICINE

## 2019-04-02 PROCEDURE — A9270 NON-COVERED ITEM OR SERVICE: HCPCS | Performed by: EMERGENCY MEDICINE

## 2019-04-02 PROCEDURE — 99285 EMERGENCY DEPT VISIT HI MDM: CPT

## 2019-04-02 PROCEDURE — 85025 COMPLETE CBC W/AUTO DIFF WBC: CPT

## 2019-04-02 PROCEDURE — 700102 HCHG RX REV CODE 250 W/ 637 OVERRIDE(OP): Performed by: EMERGENCY MEDICINE

## 2019-04-02 PROCEDURE — 83036 HEMOGLOBIN GLYCOSYLATED A1C: CPT

## 2019-04-02 PROCEDURE — 84484 ASSAY OF TROPONIN QUANT: CPT

## 2019-04-02 PROCEDURE — 81001 URINALYSIS AUTO W/SCOPE: CPT

## 2019-04-02 PROCEDURE — 70450 CT HEAD/BRAIN W/O DYE: CPT

## 2019-04-02 PROCEDURE — 96375 TX/PRO/DX INJ NEW DRUG ADDON: CPT

## 2019-04-02 PROCEDURE — 86850 RBC ANTIBODY SCREEN: CPT

## 2019-04-02 RX ORDER — SODIUM CHLORIDE 9 MG/ML
1000 INJECTION, SOLUTION INTRAVENOUS ONCE
Status: COMPLETED | OUTPATIENT
Start: 2019-04-02 | End: 2019-04-02

## 2019-04-02 RX ORDER — ASPIRIN 325 MG
325 TABLET ORAL DAILY
Status: DISCONTINUED | OUTPATIENT
Start: 2019-04-03 | End: 2019-04-05 | Stop reason: HOSPADM

## 2019-04-02 RX ORDER — POLYETHYLENE GLYCOL 3350 17 G/17G
1 POWDER, FOR SOLUTION ORAL
Status: DISCONTINUED | OUTPATIENT
Start: 2019-04-02 | End: 2019-04-05 | Stop reason: HOSPADM

## 2019-04-02 RX ORDER — ASPIRIN 325 MG
325 TABLET ORAL ONCE
Status: COMPLETED | OUTPATIENT
Start: 2019-04-02 | End: 2019-04-02

## 2019-04-02 RX ORDER — ASPIRIN 300 MG/1
300 SUPPOSITORY RECTAL DAILY
Status: DISCONTINUED | OUTPATIENT
Start: 2019-04-03 | End: 2019-04-05 | Stop reason: HOSPADM

## 2019-04-02 RX ORDER — MORPHINE SULFATE 4 MG/ML
2 INJECTION, SOLUTION INTRAMUSCULAR; INTRAVENOUS
Status: DISCONTINUED | OUTPATIENT
Start: 2019-04-02 | End: 2019-04-03

## 2019-04-02 RX ORDER — SODIUM CHLORIDE AND POTASSIUM CHLORIDE 150; 900 MG/100ML; MG/100ML
INJECTION, SOLUTION INTRAVENOUS CONTINUOUS
Status: DISCONTINUED | OUTPATIENT
Start: 2019-04-02 | End: 2019-04-04

## 2019-04-02 RX ORDER — BUSPIRONE HYDROCHLORIDE 10 MG/1
15 TABLET ORAL 3 TIMES DAILY PRN
Status: DISCONTINUED | OUTPATIENT
Start: 2019-04-02 | End: 2019-04-05 | Stop reason: HOSPADM

## 2019-04-02 RX ORDER — AMOXICILLIN 250 MG
2 CAPSULE ORAL 2 TIMES DAILY
Status: DISCONTINUED | OUTPATIENT
Start: 2019-04-02 | End: 2019-04-05 | Stop reason: HOSPADM

## 2019-04-02 RX ORDER — ASPIRIN 81 MG/1
324 TABLET, CHEWABLE ORAL DAILY
Status: DISCONTINUED | OUTPATIENT
Start: 2019-04-03 | End: 2019-04-05 | Stop reason: HOSPADM

## 2019-04-02 RX ORDER — ACETAMINOPHEN 325 MG/1
650 TABLET ORAL EVERY 6 HOURS PRN
Status: DISCONTINUED | OUTPATIENT
Start: 2019-04-02 | End: 2019-04-05 | Stop reason: HOSPADM

## 2019-04-02 RX ORDER — ERGOCALCIFEROL 1.25 MG/1
50000 CAPSULE ORAL
Status: DISCONTINUED | OUTPATIENT
Start: 2019-04-06 | End: 2019-04-05 | Stop reason: HOSPADM

## 2019-04-02 RX ORDER — ONDANSETRON 4 MG/1
4 TABLET, ORALLY DISINTEGRATING ORAL EVERY 4 HOURS PRN
Status: DISCONTINUED | OUTPATIENT
Start: 2019-04-02 | End: 2019-04-05 | Stop reason: HOSPADM

## 2019-04-02 RX ORDER — ONDANSETRON 2 MG/ML
4 INJECTION INTRAMUSCULAR; INTRAVENOUS ONCE
Status: COMPLETED | OUTPATIENT
Start: 2019-04-02 | End: 2019-04-02

## 2019-04-02 RX ORDER — BISACODYL 10 MG
10 SUPPOSITORY, RECTAL RECTAL
Status: DISCONTINUED | OUTPATIENT
Start: 2019-04-02 | End: 2019-04-05 | Stop reason: HOSPADM

## 2019-04-02 RX ORDER — ONDANSETRON 2 MG/ML
4 INJECTION INTRAMUSCULAR; INTRAVENOUS EVERY 4 HOURS PRN
Status: DISCONTINUED | OUTPATIENT
Start: 2019-04-02 | End: 2019-04-05 | Stop reason: HOSPADM

## 2019-04-02 RX ORDER — MORPHINE SULFATE 4 MG/ML
2 INJECTION, SOLUTION INTRAMUSCULAR; INTRAVENOUS ONCE
Status: COMPLETED | OUTPATIENT
Start: 2019-04-02 | End: 2019-04-02

## 2019-04-02 RX ADMIN — ONDANSETRON 4 MG: 2 INJECTION INTRAMUSCULAR; INTRAVENOUS at 16:45

## 2019-04-02 RX ADMIN — MORPHINE SULFATE 2 MG: 4 INJECTION INTRAVENOUS at 20:37

## 2019-04-02 RX ADMIN — SODIUM CHLORIDE 1000 ML: 9 INJECTION, SOLUTION INTRAVENOUS at 13:05

## 2019-04-02 RX ADMIN — MORPHINE SULFATE 2 MG: 4 INJECTION INTRAVENOUS at 16:47

## 2019-04-02 RX ADMIN — ONDANSETRON 4 MG: 2 INJECTION INTRAMUSCULAR; INTRAVENOUS at 13:45

## 2019-04-02 RX ADMIN — POTASSIUM CHLORIDE AND SODIUM CHLORIDE: 900; 150 INJECTION, SOLUTION INTRAVENOUS at 18:33

## 2019-04-02 RX ADMIN — IOHEXOL 140 ML: 350 INJECTION, SOLUTION INTRAVENOUS at 12:53

## 2019-04-02 RX ADMIN — ASPIRIN 325 MG: 325 TABLET ORAL at 13:45

## 2019-04-02 ASSESSMENT — ENCOUNTER SYMPTOMS
SHORTNESS OF BREATH: 0
COUGH: 0
CHILLS: 0
ABDOMINAL PAIN: 1
DIARRHEA: 0
CONSTIPATION: 0
FEVER: 0
DOUBLE VISION: 0
DIZZINESS: 1
WHEEZING: 1
SPEECH CHANGE: 1
FOCAL WEAKNESS: 1
BLURRED VISION: 0
BACK PAIN: 0
HEADACHES: 0
LOSS OF CONSCIOUSNESS: 0
PALPITATIONS: 0
NAUSEA: 1
SORE THROAT: 0
SPUTUM PRODUCTION: 0
HEARTBURN: 0
FALLS: 0
BLOOD IN STOOL: 0

## 2019-04-02 ASSESSMENT — COGNITIVE AND FUNCTIONAL STATUS - GENERAL
SUGGESTED CMS G CODE MODIFIER MOBILITY: CK
SUGGESTED CMS G CODE MODIFIER DAILY ACTIVITY: CK
PERSONAL GROOMING: A LITTLE
MOBILITY SCORE: 18
STANDING UP FROM CHAIR USING ARMS: A LITTLE
EATING MEALS: A LITTLE
DRESSING REGULAR UPPER BODY CLOTHING: A LITTLE
TOILETING: A LITTLE
WALKING IN HOSPITAL ROOM: A LITTLE
DAILY ACTIVITIY SCORE: 18
CLIMB 3 TO 5 STEPS WITH RAILING: A LOT
DRESSING REGULAR LOWER BODY CLOTHING: A LITTLE
HELP NEEDED FOR BATHING: A LITTLE
MOVING FROM LYING ON BACK TO SITTING ON SIDE OF FLAT BED: A LITTLE
MOVING TO AND FROM BED TO CHAIR: A LITTLE

## 2019-04-02 ASSESSMENT — LIFESTYLE VARIABLES
DO YOU DRINK ALCOHOL: NO
EVER_SMOKED: NEVER
ALCOHOL_USE: NO

## 2019-04-02 ASSESSMENT — COPD QUESTIONNAIRES
COPD SCREENING SCORE: 2
IN THE PAST 12 MONTHS DO YOU DO LESS THAN YOU USED TO BECAUSE OF YOUR BREATHING PROBLEMS: DISAGREE/UNSURE
HAVE YOU SMOKED AT LEAST 100 CIGARETTES IN YOUR ENTIRE LIFE: NO/DON'T KNOW
DURING THE PAST 4 WEEKS HOW MUCH DID YOU FEEL SHORT OF BREATH: NONE/LITTLE OF THE TIME
DO YOU EVER COUGH UP ANY MUCUS OR PHLEGM?: NO/ONLY WITH OCCASIONAL COLDS OR INFECTIONS

## 2019-04-02 NOTE — ED PROVIDER NOTES
"CHIEF COMPLAINT  Chief Complaint   Patient presents with   • Facial Droop   • Slurred Speech   • Abdominal Pain   • N/V   • Headache       HPI  Luz Maria Saavedra is a 78 y.o. female who presents for evaluation of possible stroke like symptoms. Patient contacted EMS this morning complaining of severe 10/10 abdominal pain that radiated to her back. Upon EMS arrival, patient was able to communicate with EMS in full, coherent clear sentences when she suddenly developed significant slurred speech, mild left sided facial droop, mild left extremity drift, upper and lower extremity weakness and severe 10/10 headache. Patient was also found to be hypertensive with EMS. Last seen normal time 1201 PM. Patient was just discharged from [facility] yesterday after several day admission for management of pancreatitis. Patient reports that the abdominal pain she developed today is more severe and slightly different than the abdominal pain experienced with her pancreatitis. It is a different sensation, a worse severity and located in a different area     Additional history after patient arrives back from CT. Patient is still endorsing that her current abdominal pain is \"more severe\" than that experienced with her recent pancreatitis but is unable to tell me if the pain is any different from her recent pancreatitis pain. Patient additionally reports that her lower extremity swelling have been gradually worsening over the last 2 days as well.    Patient is overall a difficult historian. But full ROS was able to be obtained.    REVIEW OF SYSTEMS  See HPI for further details. All other systems are negative.    PAST MEDICAL HISTORY  Past Medical History:   Diagnosis Date   • Anxiety and depression    • Hypertension    • Renal disorder    • Stroke (HCC)        SOCIAL HISTORY  Social History     Social History   • Marital status:      Spouse name: N/A   • Number of children: N/A   • Years of education: N/A     Social History Main Topics   • " "Smoking status: Never Smoker   • Smokeless tobacco: Never Used   • Alcohol use No   • Drug use: Yes     Other Topics Concern   • Not on file     Social History Narrative    ** Merged History Encounter **         ** Merged History Encounter **            SURGICAL HISTORY  Past Surgical History:   Procedure Laterality Date   • EXPLORATORY LAPAROTOMY  8/4/2012    Performed by KELI RAMIREZ at SURGERY [facility] ORS   • CATARACT PHACO WITH IOL  2/10/2009    Performed by ZORAIDA LÓPEZ at SURGERY SAME DAY [facility] ORS   • CATARACT PHACO WITH IOL  1/27/2009    Performed by ZORAIDA LÓPEZ at SURGERY SAME DAY [facility] ORS   • NEUROMA EXCISION  11/21/08    Performed by MARCUS RIGGINS at SURGERY SAME DAY [facility] ORS   • MIMI BY LAPAROSCOPY     • CHOLECYSTECTOMY     • HIATAL HERNIA REPAIR         CURRENT MEDICATIONS  No current facility-administered medications for this encounter.     Current Outpatient Prescriptions:   •  tamsulosin (FLOMAX) 0.4 MG capsule, Take 1 Cap by mouth ONE-HALF HOUR AFTER BREAKFAST., Disp: 30 Cap, Rfl: 1  •  aspirin EC (ECOTRIN) 81 MG Tablet Delayed Response, Take 81 mg by mouth 2 Times a Day., Disp: , Rfl:   •  busPIRone (BUSPAR) 15 MG tablet, Take 15 mg by mouth 3 times a day as needed., Disp: , Rfl:   •  vitamin D, Ergocalciferol, (DRISDOL) 93410 UNITS CAPS capsule, Take 50,000 Units by mouth every Saturday. saturday, Disp: , Rfl:   •  lisinopril (PRINIVIL) 10 MG TABS, Take 1 Tab by mouth every day. Indications: High Blood Pressure, Disp: 90 Tab, Rfl: 1    ALLERGIES  Allergies   Allergen Reactions   • Amitriptyline    • Ciprofloxacin    • Codeine    • Hydralazine      Face swelling   • Other Drug      Pt states she is allergic to many antibiotics but she does not remember what they are.   • Pcn [Penicillins]    • Potassium Shortness of Breath     \"I feel like I can't breath\".  Has tolerate IV KCl in past   • Xanax [Alprazolam]    • Zoloft    • Zyrtec [Cetirizine]        PHYSICAL " EXAM  VITAL SIGNS: BP (!) 198/120   Pulse 94   Temp 38 °C (100.4 °F) (Temporal)   Resp (!) 25   Wt 81 kg (178 lb 9.2 oz)   SpO2 93%   BMI 30.65 kg/m²      Constitutional: Well developed, well nourished actively dry heaving  HENT: Normocephalic, atraumatic; Bilateral external ears normal; Oropharynx with dry mucous membranes; No erythema or exudates in the posterior oropharynx.   Eyes: PERRL, EOMI, Conjunctiva normal. No discharge.   Neck:  Supple, nontender midline; No stridor; No nuchal rigidity.   Lymphatic: No cervical lymphadenopathy noted.   Cardiovascular: Regular rate and rhythm without murmurs, rubs, or gallop.   Thorax & Lungs: No respiratory distress, breath sounds clear to auscultation bilaterally without wheezing, rales or rhonchi. Nontender chest wall. No crepitus or subcutaneous air  Abdomen: Soft, diffuse tenderness with palpation and percussion, bowel sounds normal. No obvious masses; No pulsatile masses; no rebound, guarding, or peritoneal signs.   Skin: Good color; warm and dry without rash or petechia.  Back: Nontender, No CVA tenderness.   Extremities: Distal radial, dorsalis pedis, posterior tibial pulses are equal bilaterally; 1-2+ pitting edema BLE; Nontender calves or saphenous, No cyanosis, No clubbing.   Musculoskeletal: Good range of motion in all major joints. No tenderness to palpation or major deformities noted.   Neurologic: Alert & oriented x 4, cranial nerves II through XII intact , sensory grossly intact, Slightly decreased  on the left, very subtle drift on the left upper extremity. Slight left sided facial droop. Speech slurred but intelligible. Slight drift of left lower extremity.    EKG  Interpreted by myself as below.    LABS/RADIOLOGY/PROCEDURES  Results for orders placed or performed during the hospital encounter of 04/02/19   CBC WITH DIFFERENTIAL   Result Value Ref Range    WBC 8.7 4.8 - 10.8 K/uL    RBC 4.37 4.20 - 5.40 M/uL    Hemoglobin 12.6 12.0 - 16.0 g/dL     Hematocrit 38.1 37.0 - 47.0 %    MCV 87.2 81.4 - 97.8 fL    MCH 28.8 27.0 - 33.0 pg    MCHC 33.1 (L) 33.6 - 35.0 g/dL    RDW 43.6 35.9 - 50.0 fL    Platelet Count 221 164 - 446 K/uL    MPV 9.7 9.0 - 12.9 fL    Neutrophils-Polys 76.20 (H) 44.00 - 72.00 %    Lymphocytes 14.80 (L) 22.00 - 41.00 %    Monocytes 6.00 0.00 - 13.40 %    Eosinophils 0.90 0.00 - 6.90 %    Basophils 0.60 0.00 - 1.80 %    Immature Granulocytes 1.50 (H) 0.00 - 0.90 %    Nucleated RBC 0.00 /100 WBC    Neutrophils (Absolute) 6.63 2.00 - 7.15 K/uL    Lymphs (Absolute) 1.29 1.00 - 4.80 K/uL    Monos (Absolute) 0.52 0.00 - 0.85 K/uL    Eos (Absolute) 0.08 0.00 - 0.51 K/uL    Baso (Absolute) 0.05 0.00 - 0.12 K/uL    Immature Granulocytes (abs) 0.13 (H) 0.00 - 0.11 K/uL    NRBC (Absolute) 0.00 K/uL   COMP METABOLIC PANEL   Result Value Ref Range    Sodium 140 135 - 145 mmol/L    Potassium 3.3 (L) 3.6 - 5.5 mmol/L    Chloride 104 96 - 112 mmol/L    Co2 27 20 - 33 mmol/L    Anion Gap 9.0 0.0 - 11.9    Glucose 135 (H) 65 - 99 mg/dL    Bun 5 (L) 8 - 22 mg/dL    Creatinine 0.74 0.50 - 1.40 mg/dL    Calcium 8.6 8.5 - 10.5 mg/dL    AST(SGOT) 23 12 - 45 U/L    ALT(SGPT) 23 2 - 50 U/L    Alkaline Phosphatase 109 (H) 30 - 99 U/L    Total Bilirubin 0.7 0.1 - 1.5 mg/dL    Albumin 3.3 3.2 - 4.9 g/dL    Total Protein 6.3 6.0 - 8.2 g/dL    Globulin 3.0 1.9 - 3.5 g/dL    A-G Ratio 1.1 g/dL   PROTHROMBIN TIME   Result Value Ref Range    PT 14.5 12.0 - 14.6 sec    INR 1.12 0.87 - 1.13   APTT   Result Value Ref Range    APTT 30.6 24.7 - 36.0 sec   COD (ADULT)   Result Value Ref Range    ABO Grouping Only O     Rh Grouping Only POS     Antibody Screen-Cod NEG    TROPONIN   Result Value Ref Range    Troponin I 0.17 (H) 0.00 - 0.04 ng/mL   URINALYSIS CULTURE, IF INDICATED   Result Value Ref Range    Color Yellow     Character Clear     Specific Gravity 1.022 <1.035    Ph 8.5 (A) 5.0 - 8.0    Glucose Negative Negative mg/dL    Ketones Trace (A) Negative mg/dL    Protein  Negative Negative mg/dL    Bilirubin Negative Negative    Urobilinogen, Urine 0.2 Negative    Nitrite Negative Negative    Leukocyte Esterase Trace (A) Negative    Occult Blood Trace (A) Negative    Micro Urine Req Microscopic    ESTIMATED GFR   Result Value Ref Range    GFR If African American >60 >60 mL/min/1.73 m 2    GFR If Non African American >60 >60 mL/min/1.73 m 2   LIPASE   Result Value Ref Range    Lipase 54 11 - 82 U/L   URINE MICROSCOPIC (W/UA)   Result Value Ref Range    WBC 0-2 /hpf    RBC 0-2 /hpf    Bacteria Negative None /hpf    Epithelial Cells Negative /hpf    Hyaline Cast 0-2 /lpf   EKG (NOW)   Result Value Ref Range    Report       [facility] Emergency Dept.    Test Date:  2019  Pt Name:    JOSE DEL CID                Department: ER  MRN:        [id number]                      Room:       [facility]  Gender:     Female                       Technician: [id number]  :        [id number]                   Requested By:ARMINDA MANCERA  Order #:    [id number]                    Reading MD: Arminda Mancera    Measurements  Intervals                                Axis  Rate:       94                           P:          -16  CO:         124                          QRS:        44  QRSD:       82                           T:          -10  QT:         372  QTc:        466    Interpretive Statements  SINUS RHYTHM  BORDERLINE T ABNORMALITIES, DIFFUSE LEADS  Compared to ECG 2019 20:03:10  Left ventricular hypertrophy no longer present  T-wave abnormality still present  No ST elevation or depression  Normal axis  Normal intervals    Electronically Signed On 2019 15:34:35 PDT by Arminda Mancera           CT-ABDOMEN-PELVIS W/O   Final Result         1.  Inflammatory change surrounding the pancreatic head and body which may represent pancreatitis.      2.  Minimal right pleural effusion and minimal atelectasis or pneumonia in the right lower lobe.      3.  Diffuse fatty infiltration of the liver.  No biliary dilatation.      4.  The gallbladder is absent.            DX-CHEST-PORTABLE (1 VIEW)   Final Result      Stable prominence of the cardiomediastinal silhouette.      Atherosclerotic plaque.      Mild right perihilar atelectasis.      Stable elevation of the right hemidiaphragm.      CT-CTA NECK WITH & W/O-POST PROCESSING   Final Result      CT angiogram of the neck within normal limits for age.      CT-CTA HEAD WITH & W/O-POST PROCESS   Final Result      No thrombosis is seen within the [place].         CT-CEREBRAL PERFUSION ANALYSIS   Final Result      1.  Cerebral blood flow less than 30% likely representing completed infarct = 0 mL.      2.  T Max more than 6 seconds likely representing combination of completed infarct and ischemia = 0 mL.      3.  Mismatched volume likely representing ischemic brain/penumbra = None      4.  Please note that the cerebral perfusion was performed on the limited brain tissue around the basal ganglia region. Infarct/ischemia outside the CT perfusion sections can be missed in this study.      CT-HEAD W/O   Final Result      No acute intracranial abnormality is identified.      Mild atrophy      There are periventricular and subcortical white matter changes present.  This finding is nonspecific and could be from previous small vessel ischemia, demyelination, or gliosis.      Paranasal sinus disease.               COURSE & MEDICAL DECISION MAKING  Pertinent Labs & Imaging studies reviewed. (See chart for details)    Reviewed patient's old medical records which showed patient was admitted 3/28/19 through 4/1/19. Ct scan showed pancreatitis, near diffuse small bowel dilation with non dilated terminal ileum. Transition point was not identified,. General surgery consulted. Recommended NG tube but it was unsuccessful. Fall on 3/30/19. Significant mobility problems and increased weakness. Tried to refer to a SNF, but she refused and wanted to be discharged home. No  aneurysm of the aorta seen 3/28/19.    12:18 PM - Patient seen and examined at bedside. Discussed plan of care, including evaluating for any acute processes with lab work and imaging. Patient agrees to the plan of care. Ordered for stroke evaluation lab work and imaging to evaluate her symptoms.     12:19 PM Neurology NP at bedside, Dr. Taylor, Neurology is the attending.    12:28 PM Taken to CT    1:15 PM I re-evaluated patient at bedside. Patient reports that she is still feeling severe abdominal pain in her mid abdomen, radiating to her back. Patient is continuing to report that the pain is different than her pain experienced with her pancreatitis. Neurology has contacted nursing staff and recommended a full dose Aspirin for the patient. He has not evaluated the imaging as of yet. I spoke with patient about her discharge yesterday. She said that she did go home to live with her son, and not to the SNF as recommended.    2:44 PM- Review of neurology consult, neurologist suspects TIA due to hypertensive emergency. Recommends MRI brain without contrast, full neurology workup, aggressive glucose management, and admission. If work up is normal, no further neurology follow up is necessary. Permissive hypertension okay for 24 hours, not to exceed 220 systolic or 105 diastolic.      2:55 PM- Paged hospitalist.    3:20 PM Spoke with Dr. Urias, hospitalist, about the patient's condition. Agrees to admit patient.        78-year-old female presents via ambulance as a possible stroke.  She called the ambulance this morning because she was having an increase in her upper abdominal pain which is radiating to her back.  She was discharged yesterday with diagnosis of pancreatitis.  As the EMS personnel were evaluating the patient for abdominal pain, she suddenly had an acute onset of slurred speech, left-sided facial droop, and some left-sided weakness.  She presented to the triage desk as a code stroke.  I immediately was paged to  evaluate her.  Neurology nurse practitioner as well as the neurologist also immediately arrived to evaluate her.  She had a slight left-sided facial droop.  Her speech was slightly slurred but was intelligible.  She had some subtle weakness with  testing on the left.  There is also some subtle weakness on the left lower extremity with drift testing.  She continues to complain of abdominal pain.  She is diffusely tender throughout her abdomen.  She just had a CT scan of her abdomen and chest done a few days ago when she was admitted for the pancreatitis.  There was no evidence of aortic aneurysm.  I think aortic dissection at this time is likely.  She still has inflammatory changes around the pancreas on her CT scan without contrast.  I cannot use contrast for her CT scan of the belly today as she had just had a fairly healthy contrast load for her stroke workup.  Her CT a of the head and neck was read as negative by the radiologist.  I read the neurology evaluation and recommendation.  Neurologist feels that the patient is not a TPA candidate.  He feels that this is a TIA related to hypertensive emergency.  Her blood pressure has been running a little high, with systolics in the 190s.  He recommended permissive hypertension for the first 24 hours as long as blood pressure systolic is under 220 and diastolic under 110.  Patient was given a high-dose aspirin.  When I reevaluated her she seemed to be improving slightly and that her speech was a little bit better.  She still has a slight left-sided facial droop and some very subtle weakness of her left side on examination.  At this time patient will need to be readmitted to the hospital service for TIA likely related to hypertensive emergency as well as an exacerbation of her pancreatitis, for which she was just discharged.  Plan is to discuss the case with Dr. Moses, hospitalist on-call, to admit to their service.     DISPOSITION:  Patient will be admitted to   [facility] in guarded condition.      FINAL IMPRESSION  1.   1. Hypertensive emergency Acute   2. TIA (transient ischemic attack) Acute   3. Acute pancreatitis, unspecified complication status, unspecified pancreatitis type Acute   4. Elevated troponin Acute         Soheila SIDHU (Scribe), am scribing for, and in the presence of, Emeli Mancera M.D..    Electronically signed by: Soheila Chan (Scribe), 4/2/2019    IEmeli M.D. personally performed the services described in this documentation, as scribed by Soheila Chan in my presence, and it is both accurate and complete.    The note accurately reflects work and decisions made by me.  Emeli Mancera  4/2/2019  9:51 PM

## 2019-04-02 NOTE — H&P
Hospital Medicine History & Physical Note    Date of Service  4/3/2019    Primary Care Physician  William Coleman M.D.    Consultants  Neurology    Code Status  Full code    Chief Complaint  Abdominal pain, facial droop    History of Presenting Illness  78 y.o. female with PMHx HTN, depression, CVA who presented 4/2/2019 with abdominal pain. She was just discharged yesterday after having been admitted for acute pancreatitis. She called [facility] earlier today and during [facility] evaluation she suddenly developed facial droop and LUE drift. Her BP at that time was in the 220s. She presented to [facility] as a code stroke. Her CT head was negative and CTA brain and neck without any occlusion. She was not given TPA d/t improvement in symptoms.     She has been having worsening 10/10, sharp, epigastric pain radiating to her back. She has not eaten anything since her discharge yesterday. Per son at bedside, she did ambulate yesterday evening. She passed a loose bowel movement this morning and has been passing gas. She denies any blood in her stool.     Review of Systems  Review of Systems   Constitutional: Negative for chills and fever.   HENT: Negative for congestion, hearing loss and sore throat.    Eyes: Negative for blurred vision and double vision.   Respiratory: Positive for wheezing. Negative for cough, sputum production and shortness of breath.    Cardiovascular: Negative for chest pain and palpitations.   Gastrointestinal: Positive for abdominal pain and nausea. Negative for blood in stool, constipation, diarrhea and heartburn.   Genitourinary: Negative for dysuria and urgency.   Musculoskeletal: Negative for back pain and falls.   Skin: Negative for itching and rash.   Neurological: Positive for dizziness, speech change and focal weakness. Negative for loss of consciousness and headaches.   All other systems reviewed and are negative.      Past Medical History   has a past medical history of Anxiety and depression;  "Hypertension; Renal disorder; and Stroke (HCC).    Surgical History   has a past surgical history that includes pham by laparoscopy; neuroma excision (11/21/08); cataract phaco with iol (1/27/2009); cataract phaco with iol (2/10/2009); cholecystectomy; exploratory laparotomy (8/4/2012); and hiatal hernia repair.     Family History  family history includes No Known Problems in her father and mother.     Social History   reports that she has never smoked. She has never used smokeless tobacco. She reports that she uses drugs. She reports that she does not drink alcohol.    Allergies  Allergies   Allergen Reactions   • Amitriptyline    • Ciprofloxacin    • Codeine    • Hydralazine      Face swelling   • Other Drug      Pt states she is allergic to many antibiotics but she does not remember what they are.   • Pcn [Penicillins]    • Potassium Shortness of Breath     \"I feel like I can't breath\".  Has tolerate IV KCl in past   • Xanax [Alprazolam]    • Zoloft    • Zyrtec [Cetirizine]        Medications  Prior to Admission Medications   Prescriptions Last Dose Informant Patient Reported? Taking?   aspirin EC (ECOTRIN) 81 MG Tablet Delayed Response 4/1/2019 at 0830 Patient Yes No   Sig: Take 81 mg by mouth 2 Times a Day.   busPIRone (BUSPAR) 15 MG tablet 4/1/2019 at 2230 Patient Yes No   Sig: Take 15 mg by mouth 3 times a day as needed.   lisinopril (PRINIVIL) 10 MG TABS 4/1/2019 at 0830 Patient No No   Sig: Take 1 Tab by mouth every day. Indications: High Blood Pressure   tamsulosin (FLOMAX) 0.4 MG capsule 4/1/2019 at 0830 Patient No No   Sig: Take 1 Cap by mouth ONE-HALF HOUR AFTER BREAKFAST.   vitamin D, Ergocalciferol, (DRISDOL) 03086 UNITS CAPS capsule 3/30/2019 at 0830 Patient Yes No   Sig: Take 50,000 Units by mouth every Saturday. saturday      Facility-Administered Medications: None       Physical Exam  Temp:  [37.4 °C (99.3 °F)-38 °C (100.4 °F)] 37.4 °C (99.3 °F)  Pulse:  [] 88  Resp:  [17-24] 18  BP: " (174-198)/() 174/79  SpO2:  [91 %-96 %] 93 %    Physical Exam   Constitutional: She is oriented to person, place, and time. She appears well-developed and well-nourished.   HENT:   Head: Normocephalic and atraumatic.   Eyes: Pupils are equal, round, and reactive to light. EOM are normal.   Neck: Normal range of motion. Neck supple.   Cardiovascular: Normal rate and regular rhythm.    Pulmonary/Chest: Effort normal. No respiratory distress. She has wheezes.   Abdominal: Soft. Bowel sounds are normal. She exhibits no distension. There is tenderness (throughout). There is rebound (mostly in RUQ and RLQ) and guarding.   Musculoskeletal: Normal range of motion. She exhibits no edema.   Neurological: She is alert and oriented to person, place, and time. No cranial nerve deficit.   Generalized 4/5 weakness   Skin: Skin is warm and dry.   Psychiatric: She has a normal mood and affect.   Nursing note and vitals reviewed.      Laboratory:  Recent Labs      04/01/19 0406 04/02/19   1227  04/03/19   0012   WBC  8.4  8.7  10.2   RBC  3.50*  4.37  3.71*   HEMOGLOBIN  9.7*  12.6  11.8*   HEMATOCRIT  31.0*  38.1  33.3*   MCV  88.6  87.2  89.8   MCH  27.7  28.8  31.8   MCHC  31.3*  33.1*  35.4*   RDW  46.1  43.6  43.9   PLATELETCT  182  221  196   MPV  10.3  9.7  9.8     Recent Labs      03/31/19 0205 04/01/19   0406 04/02/19   1227   SODIUM  142  143  140   POTASSIUM  3.4*  3.7  3.3*   CHLORIDE  109  109  104   CO2  29  29  27   GLUCOSE  109*  139*  135*   BUN  11  8  5*   CREATININE  0.71  0.71  0.74   CALCIUM  8.0*  7.6*  8.6     Recent Labs      03/31/19 0205 04/01/19   0406  04/02/19   1227   ALTSGPT  22   --   23   ASTSGOT  21   --   23   ALKPHOSPHAT  64   --   109*   TBILIRUBIN  0.6   --   0.7   LIPASE   --    --   54   GLUCOSE  109*  139*  135*     Recent Labs      04/02/19   1227   APTT  30.6   INR  1.12             Recent Labs      04/02/19   1227  04/02/19   1858  04/03/19   0012   TROPONINI  0.17*  0.49*   0.39*       Urinalysis:    Recent Labs      04/02/19   1300   SPECGRAVITY  1.022   GLUCOSEUR  Negative   KETONES  Trace*   NITRITE  Negative   LEUKESTERAS  Trace*   WBCURINE  0-2   RBCURINE  0-2   BACTERIA  Negative   EPITHELCELL  Negative        Imaging:  CT-ABDOMEN-PELVIS W/O   Final Result         1.  Inflammatory change surrounding the pancreatic head and body which may represent pancreatitis.      2.  Minimal right pleural effusion and minimal atelectasis or pneumonia in the right lower lobe.      3.  Diffuse fatty infiltration of the liver. No biliary dilatation.      4.  The gallbladder is absent.            DX-CHEST-PORTABLE (1 VIEW)   Final Result      Stable prominence of the cardiomediastinal silhouette.      Atherosclerotic plaque.      Mild right perihilar atelectasis.      Stable elevation of the right hemidiaphragm.      CT-CTA NECK WITH & W/O-POST PROCESSING   Final Result      CT angiogram of the neck within normal limits for age.      CT-CTA HEAD WITH & W/O-POST PROCESS   Final Result      No thrombosis is seen within the [place].         CT-CEREBRAL PERFUSION ANALYSIS   Final Result      1.  Cerebral blood flow less than 30% likely representing completed infarct = 0 mL.      2.  T Max more than 6 seconds likely representing combination of completed infarct and ischemia = 0 mL.      3.  Mismatched volume likely representing ischemic brain/penumbra = None      4.  Please note that the cerebral perfusion was performed on the limited brain tissue around the basal ganglia region. Infarct/ischemia outside the CT perfusion sections can be missed in this study.      CT-HEAD W/O   Final Result      No acute intracranial abnormality is identified.      Mild atrophy      There are periventricular and subcortical white matter changes present.  This finding is nonspecific and could be from previous small vessel ischemia, demyelination, or gliosis.      Paranasal sinus disease.         MR-BRAIN-W/O     (Results Pending)   EC-ECHOCARDIOGRAM COMPLETE W/O CONT    (Results Pending)         Assessment/Plan:  I anticipate this patient is appropriate for observation status at this time.    * TIA (transient ischemic attack)- (present on admission)   Assessment & Plan    CT head, CTA head and neck, CTP within normal limits.   CT head reviewed by me with no intracranial bleed  Neurology consulted  MRI brain pending  Admit to neuro  Started on aspirin and statin  Follow-up on lipid panel and A1c  Every 4 hour neurochecks, allow permissive hypertension  PT/OT/SLP and PMR consulted  RN to do bedside swallow eval and will start diet accordingly       Hypertensive emergency   Assessment & Plan    BP in 220s upon evaluation by [facility]  Allow permissive HTN per neuro recs     Elevated troponin   Assessment & Plan    Likely d/t hypertensive emergency  EKG reviewed by me with no ST/T wave changes  Trend troponins     AP (abdominal pain)   Assessment & Plan    Due to lingering pancreatitis vs hypertensive emergency  Pain control and ctm  CT abdo showing inflamed pancreas  NPO, IVF     Hypokalemia- (present on admission)   Assessment & Plan    Started on IVF  Recheck tomorrow AM and check mag level         VTE prophylaxis: lovenox

## 2019-04-02 NOTE — ASSESSMENT & PLAN NOTE
CT head, CTA head and neck, CTP within normal limits.   CT head reviewed by me with no intracranial bleed  Seen by neuro  No evidence of cva on mri  Neuro exam stable  Continue aspirin and statin  No significant findings on echo  Unclear if was tia or related to htn

## 2019-04-02 NOTE — ED NOTES
Pt to Red [facility].  Report received from Jc POOLE, assumed care of patient.  Call light and belongings within reach.  Bed in lowest position.

## 2019-04-02 NOTE — CONSULTS
Chief Complaint   Patient presents with   • Facial Droop   • Slurred Speech   • Abdominal Pain   • N/V   • Headache       Problem List Items Addressed This Visit     None      Neurology Stroke Alert Consult    History of present illness:  This is an 78-year old female with PMHx significant for hypertension, questionable history of TIA/CVA, recent hospital admission (at [facility] 3/28/19-4/1/19) for SBO v. Acute Pancreatitis who presented to [facility] on 4/2/19 for a chief complaint of abdominal pain; patient reportedly called EMS for abdominal pain at 1200 today; on scene (at 1203), patient was noted to suddenly develop slurred speech, ?left facial droop and LUE drift. SBP 220s-230s on scene. . At time of presentation here, patient complaining of headache to whole head; speech slurred however fluent and comprehensible; NIHSS 2 initially and then 1 on repeat assessment significant only for dysarthria, questionably in setting of nausea/ retching on presentation. CT Brain revealed no acute intracranial abnormality; CTA Brain/neck with no LVO. Patient ultimately determined not to be a candidate for IV tPA secondary to mild/non disabling and rapidly resolving deficits; also given higher concern for deficits in setting of abdominal pain/retching at time of presentation. Currently, patient is sitting up in bed; awake and alert; complaining of persistent nausea and headache as noted above. Denies fever, chills, weakness/numbness/tingling to any part of the body; denies new problems with vision (double vision, blurred vision or loss of vision) or problems swallowing. Other ROS is limited.     Neurology has been consulted by Dr. Emeli Mancera to further evaluate the findings noted above.     Past medical history:   Past Medical History:   Diagnosis Date   • Anxiety and depression    • Hypertension    • Renal disorder    • Stroke (HCC)        Past surgical history:   Past Surgical History:   Procedure Laterality  Date   • EXPLORATORY LAPAROTOMY  8/4/2012    Performed by KELI RAMIREZ at SURGERY [facility] ORS   • CATARACT PHACO WITH IOL  2/10/2009    Performed by ZORAIDA LÓPEZ at SURGERY SAME DAY [facility] ORS   • CATARACT PHACO WITH IOL  1/27/2009    Performed by ZORAIDA LÓPEZ at SURGERY SAME DAY [facility] ORS   • NEUROMA EXCISION  11/21/08    Performed by MARCUS RIGGINS at SURGERY SAME DAY [facility] ORS   • MIMI BY LAPAROSCOPY     • CHOLECYSTECTOMY     • HIATAL HERNIA REPAIR         Family history:   No family history on file.    Social history:   Social History     Social History   • Marital status:      Spouse name: N/A   • Number of children: N/A   • Years of education: N/A     Occupational History   • Not on file.     Social History Main Topics   • Smoking status: Never Smoker   • Smokeless tobacco: Never Used   • Alcohol use No   • Drug use: Yes   • Sexual activity: Not on file     Other Topics Concern   • Not on file     Social History Narrative    ** Merged History Encounter **         ** Merged History Encounter **            Current medications:   Current Facility-Administered Medications   Medication Dose   • NS (BOLUS) infusion 1,000 mL  1,000 mL     Current Outpatient Prescriptions   Medication   • tamsulosin (FLOMAX) 0.4 MG capsule   • omeprazole (PRILOSEC) 20 MG delayed-release capsule   • flurazepam (DALMANE) 30 MG capsule   • aspirin EC (ECOTRIN) 81 MG Tablet Delayed Response   • ondansetron (ZOFRAN ODT) 4 MG TABLET DISPERSIBLE   • Loteprednol Etabonate (LOTEMAX) 0.5 % Gel   • busPIRone (BUSPAR) 15 MG tablet   • vitamin D, Ergocalciferol, (DRISDOL) 51039 UNITS CAPS capsule   • lisinopril (PRINIVIL) 10 MG TABS       Medication Allergy:  Allergies   Allergen Reactions   • Amitriptyline    • Ciprofloxacin    • Codeine    • Hydralazine      Face swelling   • Other Drug      Pt states she is allergic to many antibiotics but she does not remember what they are.   • Pcn [Penicillins]    •  "Potassium Shortness of Breath     \"I feel like I can't breath\".  Has tolerate IV KCl in past   • Xanax [Alprazolam]    • Zoloft    • Zyrtec [Cetirizine]        Review of systems:   As noted above; limited given patient's current state of discomfort.     Physical examination:   Vitals:    04/02/19 1244 04/02/19 1245 04/02/19 1254   BP: (!) 198/120     Pulse: (!) 107     Resp: 20     Temp:  38 °C (100.4 °F)    TempSrc:  Temporal    Weight:   81 kg (178 lb 9.2 oz)     General: Patient in no acute distress; appears uncomfortable.   HEENT: Normocephalic, no signs of acute trauma.   Neck: supple, no meningeal signs or carotid bruits. There is normal range of motion. No tenderness on exam.   Chest: clear to auscultation. No cough.   CV: RRR, no murmurs.   Skin: no signs of acute rashes or trauma.   Musculoskeletal: joints exhibit full range of motion, without any pain to palpation. There are no signs of joint or muscle swelling. There is no tenderness to deep palpation of muscles.   Psychiatric: No hallucinatory behavior. Denies symptoms of depression or suicidal ideation. Mood and affect appear normal on exam.     NEUROLOGICAL EXAM:   Mental status, orientation: Awake, alert and fully oriented.   Speech and language: speech is mildly dysarthric however appears to be in setting of active retching/discomfort; speech is otherwise fluent,  patient is able to name, repeat and comprehend/follow commands.    Memory: There is intact recollection of recent and remote events.   Cranial nerve exam: Pupils are 3-4 mm bilaterally and equally reactive to light and accommodation. Visual fields are intact by confrontation. There is no nystagmus on primary or secondary gaze. Intact full EOM in all directions of gaze. Face appears symmetric on activation. Sensation in the face is intact to light touch. Tongue is midline and without any signs of tongue biting or fasciculations.  Shoulder shrug is intact bilaterally.   Motor exam: Strength " is 5/5 in all extremities, no drift. Tone is normal. No abnormal movements were seen on exam.   Sensory exam reveals normal sense of light touch and pinprick in all extremities.   Deep tendon reflexes:  2+ throughout. Plantar responses are flexor. There is no clonus.   Coordination: shows a normal finger-nose-finger. Normal rapidly alternating movements.   Gait: Not assessed at this time as patient is a fall risk.       NIH Stroke Scale    1a Level of Consciousness   1b Orientation Questions   1c Response to Commands   2 Gaze   3 Visual Fields   4 Facial Movement   5 Motor Function (arm)   a Left   b Right   6 Motor Function (leg)   a Left   b Right   7 Limb Ataxia   8 Sensory   9 Language   10 Articulation 1  11 Extinction/Inattention     Score: 1    ANCILLARY DATA REVIEWED:     Lab Data Review:  Recent Results (from the past 24 hour(s))   PROTHROMBIN TIME    Collection Time: 04/02/19 12:27 PM   Result Value Ref Range    PT 14.5 12.0 - 14.6 sec    INR 1.12 0.87 - 1.13   APTT    Collection Time: 04/02/19 12:27 PM   Result Value Ref Range    APTT 30.6 24.7 - 36.0 sec       Labs reviewed by me.     Records reviewed:   Reviewed records from previous [facility] admission 3/28-4/1.       Imaging reviewed by me:     CT-CTA NECK WITH & W/O-POST PROCESSING   Final Result      CT angiogram of the neck within normal limits for age.      CT-CTA HEAD WITH & W/O-POST PROCESS   Final Result      No thrombosis is seen within the [place].         CT-CEREBRAL PERFUSION ANALYSIS   Final Result      1.  Cerebral blood flow less than 30% likely representing completed infarct = 0 mL.      2.  T Max more than 6 seconds likely representing combination of completed infarct and ischemia = 0 mL.      3.  Mismatched volume likely representing ischemic brain/penumbra = None      4.  Please note that the cerebral perfusion was performed on the limited brain tissue around the basal ganglia region. Infarct/ischemia outside the CT perfusion  sections can be missed in this study.      CT-HEAD W/O   Final Result      No acute intracranial abnormality is identified.      Mild atrophy      There are periventricular and subcortical white matter changes present.  This finding is nonspecific and could be from previous small vessel ischemia, demyelination, or gliosis.      Paranasal sinus disease.         DX-CHEST-PORTABLE (1 VIEW)    (Results Pending)       ASSESSMENT AND PLAN:  78-year old female with PMHx significant for hypertension, questionable history of TIA/CVA, recent hospital admission (at [facility] 3/28/19-4/1/19) for SBO v. Acute Pancreatitis who presented to [facility] on 4/2/19 for a chief complaint of abdominal pain; found to have slurred speech, Left facial droop and LUE drift on scene with SBP 220s. NIHSS initially 2, now 1. CT Brain, CTA Brain/neck without acute abnormality or LVO respectively. Patient ultimately determined not to be a candidate for IV tPA secondary to mild/non disabling/rapidly resolving deficits as well as low suspicion for stroke. Will however recommend to proceed with stroke/TIA work up, including MRI Brain, TTE, lipids and hemoglobin A1c.     Recommendations/Plan:     -q4h and PRN neuro assessment. VS per nursing/unit protocol. Permissive hypertension OK x 24 hours, not to exceed SBP > 220, DBP > 105. Antihypertensives per primary/ED.   -Obtain MRI Brain wo contrast.   -Telemetry; currently SR. Screen for Afib/arrhythmia. Obtain TTE.   -Give  mg PO q day and then daily.   -Atorvastatin, 80 mg PO q HS. Check Lipid panel.   -Recommend aggressive BG management per primary team; goal -180. Avoid IVF with dextrose. Check hemoglobin A1c.   -PT/OT/SLP eval and treat.   -Counseled patient regarding life style and risk factor modification for primary stroke prevention.   -All other medical management per primary team and other consultants.   -DVT Prophylaxis: SCDs.     The plan of care above has been discussed  with Dr. Roseanna Leiva MSN, BSN, AGNP-C  [facility]

## 2019-04-02 NOTE — ED NOTES
Med rec updated and complete.  Allergies reviewed. Pt denies oral antibiotic use in last 30 days   At home.  Pt did not take any medications today.

## 2019-04-03 ENCOUNTER — APPOINTMENT (OUTPATIENT)
Dept: RADIOLOGY | Facility: MEDICAL CENTER | Age: 79
DRG: 069 | End: 2019-04-03
Attending: HOSPITALIST
Payer: MEDICARE

## 2019-04-03 ENCOUNTER — PATIENT OUTREACH (OUTPATIENT)
Dept: HEALTH INFORMATION MANAGEMENT | Facility: OTHER | Age: 79
End: 2019-04-03

## 2019-04-03 LAB
ALBUMIN SERPL BCP-MCNC: 3 G/DL (ref 3.2–4.9)
ALBUMIN/GLOB SERPL: 1.1 G/DL
ALP SERPL-CCNC: 103 U/L (ref 30–99)
ALT SERPL-CCNC: 23 U/L (ref 2–50)
ANION GAP SERPL CALC-SCNC: 8 MMOL/L (ref 0–11.9)
AST SERPL-CCNC: 25 U/L (ref 12–45)
BILIRUB SERPL-MCNC: 0.6 MG/DL (ref 0.1–1.5)
BUN SERPL-MCNC: 5 MG/DL (ref 8–22)
CALCIUM SERPL-MCNC: 8 MG/DL (ref 8.5–10.5)
CHLORIDE SERPL-SCNC: 103 MMOL/L (ref 96–112)
CHOLEST SERPL-MCNC: 98 MG/DL (ref 100–199)
CO2 SERPL-SCNC: 28 MMOL/L (ref 20–33)
CREAT SERPL-MCNC: 0.74 MG/DL (ref 0.5–1.4)
ERYTHROCYTE [DISTWIDTH] IN BLOOD BY AUTOMATED COUNT: 43.9 FL (ref 35.9–50)
GLOBULIN SER CALC-MCNC: 2.8 G/DL (ref 1.9–3.5)
GLUCOSE SERPL-MCNC: 99 MG/DL (ref 65–99)
HCT VFR BLD AUTO: 33.3 % (ref 37–47)
HDLC SERPL-MCNC: 29 MG/DL
HGB BLD-MCNC: 11.8 G/DL (ref 12–16)
LDLC SERPL CALC-MCNC: 44 MG/DL
MAGNESIUM SERPL-MCNC: 1.6 MG/DL (ref 1.5–2.5)
MCH RBC QN AUTO: 31.8 PG (ref 27–33)
MCHC RBC AUTO-ENTMCNC: 35.4 G/DL (ref 33.6–35)
MCV RBC AUTO: 89.8 FL (ref 81.4–97.8)
PLATELET # BLD AUTO: 196 K/UL (ref 164–446)
PMV BLD AUTO: 9.8 FL (ref 9–12.9)
POTASSIUM SERPL-SCNC: 3.4 MMOL/L (ref 3.6–5.5)
PROT SERPL-MCNC: 5.8 G/DL (ref 6–8.2)
RBC # BLD AUTO: 3.71 M/UL (ref 4.2–5.4)
SODIUM SERPL-SCNC: 139 MMOL/L (ref 135–145)
TRIGL SERPL-MCNC: 126 MG/DL (ref 0–149)
TROPONIN I SERPL-MCNC: 0.39 NG/ML (ref 0–0.04)
WBC # BLD AUTO: 10.2 K/UL (ref 4.8–10.8)

## 2019-04-03 PROCEDURE — 770020 HCHG ROOM/CARE - TELE (206)

## 2019-04-03 PROCEDURE — 96376 TX/PRO/DX INJ SAME DRUG ADON: CPT

## 2019-04-03 PROCEDURE — 84484 ASSAY OF TROPONIN QUANT: CPT

## 2019-04-03 PROCEDURE — 80061 LIPID PANEL: CPT

## 2019-04-03 PROCEDURE — 700105 HCHG RX REV CODE 258: Performed by: INTERNAL MEDICINE

## 2019-04-03 PROCEDURE — 83735 ASSAY OF MAGNESIUM: CPT

## 2019-04-03 PROCEDURE — 97165 OT EVAL LOW COMPLEX 30 MIN: CPT

## 2019-04-03 PROCEDURE — 70551 MRI BRAIN STEM W/O DYE: CPT

## 2019-04-03 PROCEDURE — 700101 HCHG RX REV CODE 250: Performed by: HOSPITALIST

## 2019-04-03 PROCEDURE — 36415 COLL VENOUS BLD VENIPUNCTURE: CPT

## 2019-04-03 PROCEDURE — 700102 HCHG RX REV CODE 250 W/ 637 OVERRIDE(OP): Performed by: HOSPITALIST

## 2019-04-03 PROCEDURE — 700111 HCHG RX REV CODE 636 W/ 250 OVERRIDE (IP): Performed by: HOSPITALIST

## 2019-04-03 PROCEDURE — 99233 SBSQ HOSP IP/OBS HIGH 50: CPT | Performed by: HOSPITALIST

## 2019-04-03 PROCEDURE — 97161 PT EVAL LOW COMPLEX 20 MIN: CPT

## 2019-04-03 PROCEDURE — 96366 THER/PROPH/DIAG IV INF ADDON: CPT

## 2019-04-03 PROCEDURE — A9270 NON-COVERED ITEM OR SERVICE: HCPCS | Performed by: HOSPITALIST

## 2019-04-03 PROCEDURE — 85027 COMPLETE CBC AUTOMATED: CPT

## 2019-04-03 PROCEDURE — 99232 SBSQ HOSP IP/OBS MODERATE 35: CPT | Performed by: PSYCHIATRY & NEUROLOGY

## 2019-04-03 PROCEDURE — 80053 COMPREHEN METABOLIC PANEL: CPT

## 2019-04-03 PROCEDURE — 96365 THER/PROPH/DIAG IV INF INIT: CPT

## 2019-04-03 RX ORDER — SODIUM CHLORIDE 9 MG/ML
INJECTION, SOLUTION INTRAVENOUS CONTINUOUS
Status: DISCONTINUED | OUTPATIENT
Start: 2019-04-03 | End: 2019-04-04

## 2019-04-03 RX ORDER — SODIUM CHLORIDE 9 MG/ML
INJECTION, SOLUTION INTRAVENOUS
Status: ACTIVE
Start: 2019-04-03 | End: 2019-04-04

## 2019-04-03 RX ORDER — MAGNESIUM SULFATE HEPTAHYDRATE 40 MG/ML
4 INJECTION, SOLUTION INTRAVENOUS ONCE
Status: COMPLETED | OUTPATIENT
Start: 2019-04-03 | End: 2019-04-03

## 2019-04-03 RX ORDER — MORPHINE SULFATE 4 MG/ML
1 INJECTION, SOLUTION INTRAMUSCULAR; INTRAVENOUS EVERY 4 HOURS PRN
Status: DISCONTINUED | OUTPATIENT
Start: 2019-04-03 | End: 2019-04-04

## 2019-04-03 RX ADMIN — MORPHINE SULFATE 2 MG: 4 INJECTION INTRAVENOUS at 04:08

## 2019-04-03 RX ADMIN — ONDANSETRON 4 MG: 2 INJECTION INTRAMUSCULAR; INTRAVENOUS at 08:09

## 2019-04-03 RX ADMIN — ONDANSETRON 4 MG: 2 INJECTION INTRAMUSCULAR; INTRAVENOUS at 17:28

## 2019-04-03 RX ADMIN — MORPHINE SULFATE 2 MG: 4 INJECTION INTRAVENOUS at 12:30

## 2019-04-03 RX ADMIN — MAGNESIUM SULFATE IN WATER 4 G: 40 INJECTION, SOLUTION INTRAVENOUS at 15:50

## 2019-04-03 RX ADMIN — POTASSIUM CHLORIDE AND SODIUM CHLORIDE: 900; 150 INJECTION, SOLUTION INTRAVENOUS at 08:17

## 2019-04-03 RX ADMIN — SODIUM CHLORIDE: 9 INJECTION, SOLUTION INTRAVENOUS at 21:37

## 2019-04-03 RX ADMIN — ONDANSETRON 4 MG: 2 INJECTION INTRAMUSCULAR; INTRAVENOUS at 21:48

## 2019-04-03 RX ADMIN — ONDANSETRON 4 MG: 2 INJECTION INTRAMUSCULAR; INTRAVENOUS at 12:30

## 2019-04-03 RX ADMIN — ASPIRIN 325 MG: 325 TABLET ORAL at 04:08

## 2019-04-03 RX ADMIN — MORPHINE SULFATE 1 MG: 4 INJECTION INTRAVENOUS at 17:19

## 2019-04-03 RX ADMIN — MORPHINE SULFATE 2 MG: 4 INJECTION INTRAVENOUS at 00:03

## 2019-04-03 RX ADMIN — MORPHINE SULFATE 1 MG: 4 INJECTION INTRAVENOUS at 21:37

## 2019-04-03 RX ADMIN — MORPHINE SULFATE 2 MG: 4 INJECTION INTRAVENOUS at 08:10

## 2019-04-03 RX ADMIN — SENNOSIDES,DOCUSATE SODIUM 2 TABLET: 8.6; 5 TABLET, FILM COATED ORAL at 04:08

## 2019-04-03 RX ADMIN — POTASSIUM CHLORIDE AND SODIUM CHLORIDE: 900; 150 INJECTION, SOLUTION INTRAVENOUS at 04:12

## 2019-04-03 ASSESSMENT — COGNITIVE AND FUNCTIONAL STATUS - GENERAL
SUGGESTED CMS G CODE MODIFIER DAILY ACTIVITY: CI
CLIMB 3 TO 5 STEPS WITH RAILING: A LITTLE
SUGGESTED CMS G CODE MODIFIER MOBILITY: CI
DAILY ACTIVITIY SCORE: 23
MOBILITY SCORE: 23
HELP NEEDED FOR BATHING: A LITTLE

## 2019-04-03 ASSESSMENT — ENCOUNTER SYMPTOMS
COUGH: 0
ABDOMINAL PAIN: 1
DIZZINESS: 0
BLURRED VISION: 0
HEADACHES: 0
CARDIOVASCULAR NEGATIVE: 1
CONSTITUTIONAL NEGATIVE: 1
MYALGIAS: 0
SHORTNESS OF BREATH: 0
FEVER: 0
WEIGHT LOSS: 0
MUSCULOSKELETAL NEGATIVE: 1
SORE THROAT: 0
WEAKNESS: 0
DIAPHORESIS: 0
PALPITATIONS: 0
DEPRESSION: 0
VOMITING: 0
FOCAL WEAKNESS: 0
CHILLS: 0
PSYCHIATRIC NEGATIVE: 1
RESPIRATORY NEGATIVE: 1
NEUROLOGICAL NEGATIVE: 1
BRUISES/BLEEDS EASILY: 0
NAUSEA: 1
EYES NEGATIVE: 1

## 2019-04-03 ASSESSMENT — ACTIVITIES OF DAILY LIVING (ADL): TOILETING: INDEPENDENT

## 2019-04-03 ASSESSMENT — GAIT ASSESSMENTS
DISTANCE (FEET): 250
GAIT LEVEL OF ASSIST: SUPERVISED

## 2019-04-03 ASSESSMENT — LIFESTYLE VARIABLES: SUBSTANCE_ABUSE: 0

## 2019-04-03 NOTE — PROGRESS NOTES
"Date of Admission: 4/2/2019    reason for follow up: TIA    24hr INTERVAL HX:  RETURNED TO NORMAL.  No other complaints.     O-   Allergies:   Allergies   Allergen Reactions   • Amitriptyline    • Ciprofloxacin    • Codeine    • Hydralazine      Face swelling   • Other Drug      Pt states she is allergic to many antibiotics but she does not remember what they are.   • Pcn [Penicillins]    • Potassium Shortness of Breath     \"I feel like I can't breath\".  Has tolerate IV KCl in past   • Xanax [Alprazolam]    • Zoloft    • Zyrtec [Cetirizine]          Current Facility-Administered Medications:   •  morphine (pf) 4 mg/ml injection 1 mg, 1 mg, Intravenous, Q4HRS PRN, Elsy Cowan M.D.  •  potassium phosphates 30 mmol in D5W 500 mL ivpb, 30 mmol, Intravenous, Once, Elsy Cowan M.D.  •  magnesium sulfate IVPB premix 4 g, 4 g, Intravenous, Once, Elsy Cowan M.D.  •  busPIRone (BUSPAR) tablet 15 mg, 15 mg, Oral, TID PRN, Genaro Urias M.D.  •  [START ON 4/6/2019] vitamin D (Ergocalciferol) (DRISDOL) capsule 50,000 Units, 50,000 Units, Oral, Q SATURDAY, Genaro Urias M.D.  •  Pharmacy consult request - Allow for permissive hypertension: SBP up to 220 mmHg/DBP up to 120 mmHg x 48 hours, , Other, PHARMACY TO DOSE, Genaro Urisa M.D.  •  acetaminophen (TYLENOL) tablet 650 mg, 650 mg, Oral, Q6HRS PRN, Genaro Urias M.D.  •  ondansetron (ZOFRAN) syringe/vial injection 4 mg, 4 mg, Intravenous, Q4HRS PRN, Genaro Urias M.D., 4 mg at 04/03/19 1230  •  ondansetron (ZOFRAN ODT) dispertab 4 mg, 4 mg, Oral, Q4HRS PRN, Genaro Urias M.D.  •  senna-docusate (PERICOLACE or SENOKOT S) 8.6-50 MG per tablet 2 Tab, 2 Tab, Oral, BID, 2 Tab at 04/03/19 0408 **AND** polyethylene glycol/lytes (MIRALAX) PACKET 1 Packet, 1 Packet, Oral, QDAY PRN **AND** magnesium hydroxide (MILK OF MAGNESIA) suspension 30 mL, 30 mL, Oral, QDAY PRN **AND** bisacodyl (DULCOLAX) suppository 10 mg, 10 mg, Rectal, QDAY PRN, Genaro Urias M.D.  •  aspirin " (ASA) tablet 325 mg, 325 mg, Oral, DAILY, 325 mg at 04/03/19 0408 **OR** aspirin (ASA) chewable tab 324 mg, 324 mg, Oral, DAILY **OR** aspirin (ASA) suppository 300 mg, 300 mg, Rectal, DAILY, Genaro Urias M.D.  •  0.9 % NaCl with KCl 20 mEq infusion, , Intravenous, Continuous, Genaro Urias M.D., Last Rate: 75 mL/hr at 04/03/19 0817  •  enoxaparin (LOVENOX) inj 40 mg, 40 mg, Subcutaneous, DAILY, Genaro Urias M.D.      PHYSICAL EXAM    Vitals:    04/03/19 0000 04/03/19 0421 04/03/19 0852 04/03/19 1214   BP: (!) 174/79 (!) 174/74 153/69 (!) 165/79   Pulse: 88 84 78 80   Resp: 18 19 19 18   Temp: 37.4 °C (99.3 °F) 37.3 °C (99.2 °F) 37.1 °C (98.7 °F) 37.1 °C (98.8 °F)   TempSrc: Temporal Temporal Temporal Temporal   SpO2: 93% 92% 90% 90%   Weight:           Head/Neck: NCAT. no meningismus neg kernig neg brudzinski. No obvious mass or heard bruit. No tender arteries or lost pulses. No rash of head or neck.    Skin: Warm, dry, intact. No rashes observed head/neck or body    Eyes/Funduscopic:  unable    Mental Status: Awake, alert, oriented x 3. Name/repeat/fluent/command follow intact. No neglect/extinction. Attention and concentration, recent & remote memory, Fund of Knowledge wnl.     Cranial Nerves:  CN II-XII intact. PERRL 3MM.   No afferent pupillary defect. EOM full. VF full. No nystagmus.       Motor:  bulk & tone wnl. strength intact. no abn mvmts.       Sensory: symmetric to sharp     Coordination: dysmetria absent    DTR's: intact/sym. no clonus. Toes mute.    Gait/Station: n/a fall concern      Labs:  Recent Labs      04/01/19   0406  04/02/19   1227  04/03/19   0012   WBC  8.4  8.7  10.2   RBC  3.50*  4.37  3.71*   HEMOGLOBIN  9.7*  12.6  11.8*   HEMATOCRIT  31.0*  38.1  33.3*   MCV  88.6  87.2  89.8   MCH  27.7  28.8  31.8   MCHC  31.3*  33.1*  35.4*   RDW  46.1  43.6  43.9   PLATELETCT  182  221  196   MPV  10.3  9.7  9.8     Recent Labs      04/01/19   0406  04/02/19   1227  04/03/19   0444   SODIUM   143  140  139   POTASSIUM  3.7  3.3*  3.4*   CHLORIDE  109  104  103   CO2  29  27  28   GLUCOSE  139*  135*  99   BUN  8  5*  5*   CREATININE  0.71  0.74  0.74   CALCIUM  7.6*  8.6  8.0*     Recent Labs      04/02/19   1227   APTT  30.6   INR  1.12         Recent Labs      04/02/19   1227  04/02/19   1858  04/03/19   0012   TROPONINI  0.17*  0.49*  0.39*     Recent Labs      04/03/19   0444   TRIGLYCERIDE  126   HDL  29*   LDL  44     Recent Labs      04/01/19   0406  04/02/19   1227  04/03/19   0444   SODIUM  143  140  139   POTASSIUM  3.7  3.3*  3.4*   CHLORIDE  109  104  103   CO2  29  27  28   GLUCOSE  139*  135*  99   BUN  8  5*  5*     Recent Labs      04/01/19   0406  04/02/19   1227  04/03/19   0444   SODIUM  143  140  139   POTASSIUM  3.7  3.3*  3.4*   CHLORIDE  109  104  103   CO2  29  27  28   BUN  8  5*  5*   CREATININE  0.71  0.74  0.74   MAGNESIUM   --    --   1.6   CALCIUM  7.6*  8.6  8.0*     Recent Labs      04/02/19   1227   APTT  30.6   INR  1.12     Results for orders placed or performed during the hospital encounter of 10/03/14   Echocardiogram Comp W/O Cont   Result Value Ref Range    Eject.Frac. MOD BP 69.82     Eject.Frac. MOD 4C 70.7     Eject.Frac. MOD 2C 74.02               Imaging: neuroimaging reviewed and directly visualized by me  MR-BRAIN-W/O   Final Result      1.  Moderate supratentorial white matter disease most consistent with microvascular ischemic change. Common findings for the patient's age.   2.  No evidence of acute infarction, hemorrhage, or mass lesion.      CT-ABDOMEN-PELVIS W/O   Final Result         1.  Inflammatory change surrounding the pancreatic head and body which may represent pancreatitis.      2.  Minimal right pleural effusion and minimal atelectasis or pneumonia in the right lower lobe.      3.  Diffuse fatty infiltration of the liver. No biliary dilatation.      4.  The gallbladder is absent.            DX-CHEST-PORTABLE (1 VIEW)   Final Result      Stable  prominence of the cardiomediastinal silhouette.      Atherosclerotic plaque.      Mild right perihilar atelectasis.      Stable elevation of the right hemidiaphragm.      CT-CTA NECK WITH & W/O-POST PROCESSING   Final Result      CT angiogram of the neck within normal limits for age.      CT-CTA HEAD WITH & W/O-POST PROCESS   Final Result      No thrombosis is seen within the [place].         CT-CEREBRAL PERFUSION ANALYSIS   Final Result      1.  Cerebral blood flow less than 30% likely representing completed infarct = 0 mL.      2.  T Max more than 6 seconds likely representing combination of completed infarct and ischemia = 0 mL.      3.  Mismatched volume likely representing ischemic brain/penumbra = None      4.  Please note that the cerebral perfusion was performed on the limited brain tissue around the basal ganglia region. Infarct/ischemia outside the CT perfusion sections can be missed in this study.      CT-HEAD W/O   Final Result      No acute intracranial abnormality is identified.      Mild atrophy      There are periventricular and subcortical white matter changes present.  This finding is nonspecific and could be from previous small vessel ischemia, demyelination, or gliosis.      Paranasal sinus disease.         EC-ECHOCARDIOGRAM COMPLETE W/O CONT    (Results Pending)       Assessment/Plan:    TIA, HTN EMERGENCY    Continue antiplatelet as tolerates, aspirin low dose  Max statin, bp and bg control  TTE pend      No further neuro workup as inpatient if aforementioned studies WNL & maintains neuro baseline.  Neuro sign off, reconsult PRN.  F/u otpt Neuro MD in coming months.       Ravi Taylor M.D.  , Neurohospitalist & Stroke  Clinical Professor, [employer]  Diplomate, Neurology & Neuroimaging

## 2019-04-03 NOTE — CARE PLAN
Problem: Discharge Barriers/Planning  Goal: Patient's continuum of care needs will be met  PT/OT     Problem: Pain Management  Goal: Pain level will decrease to patient's comfort goal  PRN pain medication given for pain management, RN will continue to assess pts pain levels throughout the shift.

## 2019-04-03 NOTE — PROGRESS NOTES
Bedside report received from day shift RN. Assumed care. Pt is A&O x 4, Pt is in SR 88. Pt denies pain at this time. Pt was updated on plan of care. Pt has call light, personal belongings, and bedside table within reach. Bed is in the lowest position and bed alarm is on. Will continue to monitor.

## 2019-04-03 NOTE — PROGRESS NOTES
Pt arrived to unit at 1823 via gurney.  VSS.  Assessment complete. No signs of distress noted at this time.  Pt reports pain, pt made aware when can have pain meds again. Fall precautions and appropriate signs in place.  Pt educated regarding fall precautions.  Pt oriented to unit routine, call light/phone system and RN extension number provided.  Pt denies any additional needs at this time.  Call light within reach. Will continue to monitor.

## 2019-04-03 NOTE — THERAPY
"Occupational Therapy Evaluation completed.   Functional Status: Supv transfers without AD, supv toileting, supv standing grooming, supv LB dressing, supv bed mobility   Plan of Care: Patient with no further skilled OT needs in the acute care setting at this time  Discharge Recommendations:  Equipment: No Equipment Needed. Post-acute therapy: Discharge to home with outpatient or home health for additional skilled therapy services.    See \"Rehab Therapy-Acute\" Patient Summary Report for complete documentation.    78 y.o. female with recent admit (3/28-4/1) for pancreatitis, admitted with facial droop, LUE drift, abdominal pain, HTN. Work-up negative for CVA. Seen now for OT eval. Pt demos intact, equal BUE strength, AROM, sensation, coordination. No c/o visual changes. Completing basic ADL and transfers with no more than supv. Pt appears to be at most recent baseline, but would benefit from  OT as pt lives alone. No further acute OT needs at this time.     "

## 2019-04-03 NOTE — PROGRESS NOTES
· 2 RN skin check complete with ZULEMA Noble.  · Devices in place N/A  · Skin assessed under devices N/A.  · Confirmed pressure ulcers found on N/A.  · New potential pressure ulcers noted on N/A. Wound consult placed and wound reported.  · The following interventions in place pillows in use for positioning/support, pt educated on Q2 turns.    Bruising and redness noted to BUE. Pt states it is from Lab draws.  Bruising noted to R upper neck and trunk. Pt had a fall previously before admit.   Sacrum pink and blanching.  Pink/redness to groin.  Bruising noted to BLE.  BLE dusky and dry.  Heels are red and blanching. Pillow in use to float.

## 2019-04-03 NOTE — ASSESSMENT & PLAN NOTE
Recent w/u for pancreatitis, lipase now wnl but  CT abdo showing inflamed pancreas  Improving  Exam much improved  Trial of clears, wean off iv pain meds

## 2019-04-03 NOTE — PROGRESS NOTES
[facility] Medicine Daily Progress Note    Date of Service  4/3/2019    Chief Complaint  78 y.o. female admitted 4/2/2019 with abdominal pain and facial droop    Hospital Course   Patient is an 78 y.o. female with PMHx HTN, depression, CVA who presented 4/2/2019 with abdominal pain. She had just been discharged after having been admitted for acute pancreatitis. She called [facility] and they noted slurred speech, facial droop and LUE drift. Her BP at that time was in the 220s. She presented to [facility] as a code stroke. Her CT head was negative and CTA brain and neck without any occlusion. She was not given TPA d/t improvement in symptoms          Interval Problem Update  BP much improved  Facial droop resolved  No numbness, tingling or change in speech  She reports ongoing abdominal pain, she reports it has been stable for one week  Currently rated at 6-7/10, no n/v  We discussed importance of npo in light of ongoing abdominal pain and s/o pancreatic inflammation on CT- she agreed to just wetting her mouth but not swallowing, nursing informed me that they found her drinking from the sink  No cp, no sob  axox3  Ros otherwise negative  Pt appears comfortable    Consultants/Specialty      Code Status  full    Disposition  tbd    Review of Systems  Review of Systems   Constitutional: Negative.  Negative for chills, diaphoresis, fever, malaise/fatigue and weight loss.   HENT: Negative.  Negative for sore throat.    Eyes: Negative.  Negative for blurred vision.   Respiratory: Negative.  Negative for cough and shortness of breath.    Cardiovascular: Negative.  Negative for chest pain, palpitations and leg swelling.   Gastrointestinal: Positive for abdominal pain and nausea. Negative for vomiting.   Genitourinary: Negative.  Negative for dysuria.   Musculoskeletal: Negative.  Negative for myalgias.   Skin: Negative.  Negative for itching and rash.   Neurological: Negative.  Negative for dizziness, focal weakness, weakness and  headaches.   Endo/Heme/Allergies: Negative.  Does not bruise/bleed easily.   Psychiatric/Behavioral: Negative.  Negative for depression, substance abuse and suicidal ideas.   All other systems reviewed and are negative.       Physical Exam  Temp:  [37.1 °C (98.7 °F)-37.6 °C (99.6 °F)] 37.1 °C (98.8 °F)  Pulse:  [78-93] 80  Resp:  [17-20] 18  BP: (153-177)/(69-82) 165/79  SpO2:  [90 %-96 %] 90 %    Physical Exam   Constitutional: She is oriented to person, place, and time. She appears well-developed and well-nourished. No distress.   HENT:   Head: Normocephalic and atraumatic.   Mouth/Throat: Oropharynx is clear and moist.   Eyes: Conjunctivae are normal.   Neck: Normal range of motion. Neck supple.   Cardiovascular: Normal rate, normal heart sounds and intact distal pulses.  Exam reveals no gallop and no friction rub.    No murmur heard.  Pulmonary/Chest: Effort normal and breath sounds normal. No respiratory distress. She has no wheezes. She has no rales. She exhibits no tenderness.   Abdominal: Soft. Bowel sounds are normal. She exhibits no distension and no mass. There is no tenderness. There is no rebound and no guarding.   Musculoskeletal: Normal range of motion. She exhibits no edema or tenderness.   Neurological: She is alert and oriented to person, place, and time. She has normal reflexes. No cranial nerve deficit. She exhibits normal muscle tone. Coordination normal.   Skin: Skin is warm and dry. No rash noted. She is not diaphoretic. No erythema. No pallor.   Psychiatric: She has a normal mood and affect. Her behavior is normal. Judgment and thought content normal.   Nursing note and vitals reviewed.      Fluids    Intake/Output Summary (Last 24 hours) at 04/03/19 1331  Last data filed at 04/03/19 0600   Gross per 24 hour   Intake                0 ml   Output              100 ml   Net             -100 ml       Laboratory  Recent Labs      04/01/19   0406  04/02/19   1227  04/03/19   0012   WBC  8.4  8.7   10.2   RBC  3.50*  4.37  3.71*   HEMOGLOBIN  9.7*  12.6  11.8*   HEMATOCRIT  31.0*  38.1  33.3*   MCV  88.6  87.2  89.8   MCH  27.7  28.8  31.8   MCHC  31.3*  33.1*  35.4*   RDW  46.1  43.6  43.9   PLATELETCT  182  221  196   MPV  10.3  9.7  9.8     Recent Labs      04/01/19   0406  04/02/19   1227  04/03/19   0444   SODIUM  143  140  139   POTASSIUM  3.7  3.3*  3.4*   CHLORIDE  109  104  103   CO2  29  27  28   GLUCOSE  139*  135*  99   BUN  8  5*  5*   CREATININE  0.71  0.74  0.74   CALCIUM  7.6*  8.6  8.0*     Recent Labs      04/02/19   1227   APTT  30.6   INR  1.12         Recent Labs      04/03/19   0444   TRIGLYCERIDE  126   HDL  29*   LDL  44       Imaging  MR-BRAIN-W/O   Final Result      1.  Moderate supratentorial white matter disease most consistent with microvascular ischemic change. Common findings for the patient's age.   2.  No evidence of acute infarction, hemorrhage, or mass lesion.      CT-ABDOMEN-PELVIS W/O   Final Result         1.  Inflammatory change surrounding the pancreatic head and body which may represent pancreatitis.      2.  Minimal right pleural effusion and minimal atelectasis or pneumonia in the right lower lobe.      3.  Diffuse fatty infiltration of the liver. No biliary dilatation.      4.  The gallbladder is absent.            DX-CHEST-PORTABLE (1 VIEW)   Final Result      Stable prominence of the cardiomediastinal silhouette.      Atherosclerotic plaque.      Mild right perihilar atelectasis.      Stable elevation of the right hemidiaphragm.      CT-CTA NECK WITH & W/O-POST PROCESSING   Final Result      CT angiogram of the neck within normal limits for age.      CT-CTA HEAD WITH & W/O-POST PROCESS   Final Result      No thrombosis is seen within the [place].         CT-CEREBRAL PERFUSION ANALYSIS   Final Result      1.  Cerebral blood flow less than 30% likely representing completed infarct = 0 mL.      2.  T Max more than 6 seconds likely representing combination of  completed infarct and ischemia = 0 mL.      3.  Mismatched volume likely representing ischemic brain/penumbra = None      4.  Please note that the cerebral perfusion was performed on the limited brain tissue around the basal ganglia region. Infarct/ischemia outside the CT perfusion sections can be missed in this study.      CT-HEAD W/O   Final Result      No acute intracranial abnormality is identified.      Mild atrophy      There are periventricular and subcortical white matter changes present.  This finding is nonspecific and could be from previous small vessel ischemia, demyelination, or gliosis.      Paranasal sinus disease.         EC-ECHOCARDIOGRAM COMPLETE W/O CONT    (Results Pending)        Assessment/Plan  * TIA (transient ischemic attack)- (present on admission)   Assessment & Plan    CT head, CTA head and neck, CTP within normal limits.   CT head reviewed by me with no intracranial bleed  Neurology following  MRI brain pending  Neuro exam stable  Continue aspirin and statin  Continue to follow       Hypertensive emergency   Assessment & Plan    BP in 220s upon evaluation by [facility]  Allow permissive HTN per neuro recs     Elevated troponin   Assessment & Plan    Likely d/t hypertensive emergency  EKG wnl  Minimally elevated and decreasing     AP (abdominal pain)   Assessment & Plan    Due to lingering pancreatitis vs hypertensive emergency  Exam normal today (much improved vs exam described yesterday)  CT abdo showing inflamed pancreas  Continue npo  following     Hypokalemia- (present on admission)   Assessment & Plan    Mag low - replacing  Continue potassium replacement  following          VTE prophylaxis: lovenox

## 2019-04-03 NOTE — DISCHARGE PLANNING
"LSW met w/ pt and pt's son, Enio, at bedside to complete assessment. Pt A/Ox4 and pt agreeable to assessment. Pt states that she was just released from the hospital a few days ago and was accepted by [facility]. However, pt was readmitted to the hospital today (4/2/19) and was never seen by [facility]. Pt also reports that Home Health is aware that pt is now in the hospital. Pt does have an upcoming appointment w/ PCP on 4/19 and pt is worried that she won't be able to make the appointment. Pt's son was encouraging the pt not to worry about that right now. Pt's preferred pharmacy is [facility] on [address] ([phone]). Pt's son, Enio ([phone]) reports that pt does have a living will but was unsure if the  completed DPOA paperwork. Pt lives alone but has a lot of family support. Pt's basic needs are met w/ current income and pt declining assistance and resources at this time.     Care Transition Team Assessment    Information Source  Orientation : Oriented x 4  Information Given By: Patient  Informant's Name: Luz Maria Saavedra  Who is responsible for making decisions for patient? : Patient    Readmission Evaluation  Is this a readmission?: Yes - unplanned readmission  Why do you think you were readmitted?:  (\"I wasn't feel good and my son called 911\")  Was an appointment arranged for you prior to discharge?: Yes, did not attend appointment  Why didn't you go to your appointment?: Other (comment) (came back to the hospital a day after discharge)  Did you understand your discharge instructions?: Yes  Did you have enough support after your last discharge?: Yes              Discharge Preparedness  What is your plan after discharge?: Uncertain - pending medical team collaboration  What are your discharge supports?: Child  Prior Functional Level: Needs Assist with Activities of Daily Living, Needs Assist with Medication Management, Uses Cane    Functional Assesment  Prior Functional Level: " Needs Assist with Activities of Daily Living, Needs Assist with Medication Management, Uses Cane    Finances  Financial Barriers to Discharge: No  Prescription Coverage: Yes  Prescription Coverage Comments: Preferred pharmacy[facility] ([address])              Advance Directive  Advance Directive?: Living Will, DPOA for Health Care (Pt states that a  helped her complete paperwork.)  Durable Power of  Name and Contact : Caio Saavedra [phone]    Domestic Abuse  Have you ever been the victim of abuse or violence?: No    Psychological Assessment  History of Substance Abuse: None  History of Psychiatric Problems: No (Pt's son, Enio, said he believes pt has anxiety.)    Discharge Risks or Barriers  Discharge risks or barriers?: Complex medical needs (Pt lives alone but has a lot of family suppport)  Patient risk factors: Complex medical needs, Readmission (Pt was discharged from hospital on 4/1/19)    Anticipated Discharge Information  Anticipated discharge disposition: Discharge needs currently unknown  Discharge Address: Home: [address] [place] [id number]  Discharge Contact Phone Number: Home: [phone]

## 2019-04-03 NOTE — DISCHARGE PLANNING
RN CM met with pt to get HHC choice.    Pt was already set up with [facility] on last admission.    Pt will remain with [facility]    CM faxed choice to Katja ESPINAL

## 2019-04-03 NOTE — PROGRESS NOTES
Received report, assumed pt care. Pt a&o 4, VSS, assessment completed. Resting comfortably in bed with call light, bedside table in reach. No c/o at this time. Side rails up 2. Instructed to use call light when needing to get OOB verbalized understanding. Bed alarm on, bed in low position. Will continue to monitor.

## 2019-04-03 NOTE — DISCHARGE PLANNING
PMR referral from Dr Urias     Stroke protocol CT negative for acute event MObility and self care scores 18/18. Recent hospital stay treated for pancreatitis discharged home with home health. Anticipate outpatient follow up when medically cleared to discharge. No physiatry consult ordered per porptocol.

## 2019-04-03 NOTE — THERAPY
"Physical Therapy Evaluation completed.   Bed Mobility: Modified Independent     Transfers: Supervised    Gait: Supervised with No Equipment Needed       Plan of Care: Patient with no further skilled PT needs in the acute care setting at this time  Discharge Recommendations: Equipment: No Equipment Needed. Post-acute therapy Currently anticipate no further skilled therapy needs once patient is discharged from the inpatient setting.    Pt admitted for TIA workup with recent admit and DC. She presents very near or at her functional baseline. Pt completed x250' of gait without device and did not require physical assist. At this time, pt does not require further acute PT intervention and appears functionally capable of return home.     See \"Rehab Therapy-Acute\" Patient Summary Report for complete documentation.     "

## 2019-04-04 ENCOUNTER — APPOINTMENT (OUTPATIENT)
Dept: CARDIOLOGY | Facility: MEDICAL CENTER | Age: 79
DRG: 069 | End: 2019-04-04
Attending: HOSPITALIST
Payer: MEDICARE

## 2019-04-04 LAB
ANION GAP SERPL CALC-SCNC: 10 MMOL/L (ref 0–11.9)
BASOPHILS # BLD AUTO: 0.5 % (ref 0–1.8)
BASOPHILS # BLD: 0.06 K/UL (ref 0–0.12)
BUN SERPL-MCNC: 8 MG/DL (ref 8–22)
CALCIUM SERPL-MCNC: 7.7 MG/DL (ref 8.5–10.5)
CHLORIDE SERPL-SCNC: 103 MMOL/L (ref 96–112)
CO2 SERPL-SCNC: 25 MMOL/L (ref 20–33)
CREAT SERPL-MCNC: 0.6 MG/DL (ref 0.5–1.4)
EOSINOPHIL # BLD AUTO: 0.12 K/UL (ref 0–0.51)
EOSINOPHIL NFR BLD: 1.1 % (ref 0–6.9)
ERYTHROCYTE [DISTWIDTH] IN BLOOD BY AUTOMATED COUNT: 45.2 FL (ref 35.9–50)
GLUCOSE SERPL-MCNC: 64 MG/DL (ref 65–99)
HCT VFR BLD AUTO: 35.2 % (ref 37–47)
HGB BLD-MCNC: 11.1 G/DL (ref 12–16)
IGG1 SER-MCNC: 484 MG/DL (ref 240–1118)
IGG2 SER-MCNC: 305 MG/DL (ref 124–549)
IGG3 SER-MCNC: 100 MG/DL (ref 21–134)
IGG4 SER-MCNC: 25 MG/DL (ref 1–123)
IMM GRANULOCYTES # BLD AUTO: 0.22 K/UL (ref 0–0.11)
IMM GRANULOCYTES NFR BLD AUTO: 1.9 % (ref 0–0.9)
LV EJECT FRACT  99904: 70
LV EJECT FRACT MOD 2C 99903: 79.23
LV EJECT FRACT MOD 4C 99902: 70.65
LV EJECT FRACT MOD BP 99901: 75.56
LYMPHOCYTES # BLD AUTO: 1.37 K/UL (ref 1–4.8)
LYMPHOCYTES NFR BLD: 12.1 % (ref 22–41)
MAGNESIUM SERPL-MCNC: 2 MG/DL (ref 1.5–2.5)
MCH RBC QN AUTO: 27.9 PG (ref 27–33)
MCHC RBC AUTO-ENTMCNC: 31.5 G/DL (ref 33.6–35)
MCV RBC AUTO: 88.4 FL (ref 81.4–97.8)
MONOCYTES # BLD AUTO: 0.59 K/UL (ref 0–0.85)
MONOCYTES NFR BLD AUTO: 5.2 % (ref 0–13.4)
NEUTROPHILS # BLD AUTO: 9 K/UL (ref 2–7.15)
NEUTROPHILS NFR BLD: 79.2 % (ref 44–72)
NRBC # BLD AUTO: 0 K/UL
NRBC BLD-RTO: 0 /100 WBC
NUCLEAR IGG SER QL IA: NORMAL
PLATELET # BLD AUTO: 207 K/UL (ref 164–446)
PMV BLD AUTO: 9.5 FL (ref 9–12.9)
POTASSIUM SERPL-SCNC: 3.4 MMOL/L (ref 3.6–5.5)
RBC # BLD AUTO: 3.98 M/UL (ref 4.2–5.4)
SODIUM SERPL-SCNC: 138 MMOL/L (ref 135–145)
WBC # BLD AUTO: 11.4 K/UL (ref 4.8–10.8)

## 2019-04-04 PROCEDURE — 85025 COMPLETE CBC W/AUTO DIFF WBC: CPT

## 2019-04-04 PROCEDURE — 770020 HCHG ROOM/CARE - TELE (206)

## 2019-04-04 PROCEDURE — 770006 HCHG ROOM/CARE - MED/SURG/GYN SEMI*

## 2019-04-04 PROCEDURE — 93306 TTE W/DOPPLER COMPLETE: CPT

## 2019-04-04 PROCEDURE — 700102 HCHG RX REV CODE 250 W/ 637 OVERRIDE(OP): Performed by: HOSPITALIST

## 2019-04-04 PROCEDURE — 83735 ASSAY OF MAGNESIUM: CPT

## 2019-04-04 PROCEDURE — 93306 TTE W/DOPPLER COMPLETE: CPT | Mod: 26 | Performed by: INTERNAL MEDICINE

## 2019-04-04 PROCEDURE — 99232 SBSQ HOSP IP/OBS MODERATE 35: CPT | Performed by: HOSPITALIST

## 2019-04-04 PROCEDURE — 700111 HCHG RX REV CODE 636 W/ 250 OVERRIDE (IP): Performed by: HOSPITALIST

## 2019-04-04 PROCEDURE — 80048 BASIC METABOLIC PNL TOTAL CA: CPT

## 2019-04-04 PROCEDURE — 36415 COLL VENOUS BLD VENIPUNCTURE: CPT

## 2019-04-04 PROCEDURE — 700105 HCHG RX REV CODE 258: Performed by: INTERNAL MEDICINE

## 2019-04-04 PROCEDURE — A9270 NON-COVERED ITEM OR SERVICE: HCPCS | Performed by: HOSPITALIST

## 2019-04-04 RX ORDER — TRAMADOL HYDROCHLORIDE 50 MG/1
50 TABLET ORAL EVERY 6 HOURS PRN
Status: DISCONTINUED | OUTPATIENT
Start: 2019-04-04 | End: 2019-04-05 | Stop reason: HOSPADM

## 2019-04-04 RX ORDER — SUCRALFATE ORAL 1 G/10ML
1 SUSPENSION ORAL EVERY 6 HOURS
Status: DISCONTINUED | OUTPATIENT
Start: 2019-04-04 | End: 2019-04-05 | Stop reason: HOSPADM

## 2019-04-04 RX ORDER — MORPHINE SULFATE 4 MG/ML
1 INJECTION, SOLUTION INTRAMUSCULAR; INTRAVENOUS 2 TIMES DAILY PRN
Status: DISCONTINUED | OUTPATIENT
Start: 2019-04-04 | End: 2019-04-04

## 2019-04-04 RX ORDER — POTASSIUM CHLORIDE 20 MEQ/1
40 TABLET, EXTENDED RELEASE ORAL ONCE
Status: DISPENSED | OUTPATIENT
Start: 2019-04-04 | End: 2019-04-05

## 2019-04-04 RX ORDER — AMLODIPINE BESYLATE 5 MG/1
2.5 TABLET ORAL
Status: DISCONTINUED | OUTPATIENT
Start: 2019-04-04 | End: 2019-04-05

## 2019-04-04 RX ORDER — SODIUM CHLORIDE AND POTASSIUM CHLORIDE 150; 900 MG/100ML; MG/100ML
1000 INJECTION, SOLUTION INTRAVENOUS CONTINUOUS
Status: DISCONTINUED | OUTPATIENT
Start: 2019-04-04 | End: 2019-04-05 | Stop reason: HOSPADM

## 2019-04-04 RX ORDER — FAMOTIDINE 20 MG/1
20 TABLET, FILM COATED ORAL 2 TIMES DAILY
Status: DISCONTINUED | OUTPATIENT
Start: 2019-04-04 | End: 2019-04-05 | Stop reason: HOSPADM

## 2019-04-04 RX ORDER — SIMVASTATIN 40 MG
20 TABLET ORAL EVERY EVENING
Status: DISCONTINUED | OUTPATIENT
Start: 2019-04-04 | End: 2019-04-05 | Stop reason: HOSPADM

## 2019-04-04 RX ADMIN — AMLODIPINE BESYLATE 2.5 MG: 5 TABLET ORAL at 13:50

## 2019-04-04 RX ADMIN — MORPHINE SULFATE 1 MG: 4 INJECTION INTRAVENOUS at 06:53

## 2019-04-04 RX ADMIN — ONDANSETRON 4 MG: 2 INJECTION INTRAMUSCULAR; INTRAVENOUS at 15:31

## 2019-04-04 RX ADMIN — MORPHINE SULFATE 1 MG: 4 INJECTION INTRAVENOUS at 02:56

## 2019-04-04 RX ADMIN — ONDANSETRON 4 MG: 2 INJECTION INTRAMUSCULAR; INTRAVENOUS at 06:53

## 2019-04-04 RX ADMIN — ASPIRIN 325 MG: 325 TABLET ORAL at 06:57

## 2019-04-04 RX ADMIN — ONDANSETRON 4 MG: 2 INJECTION INTRAMUSCULAR; INTRAVENOUS at 22:19

## 2019-04-04 RX ADMIN — ACETAMINOPHEN 650 MG: 325 TABLET, FILM COATED ORAL at 22:15

## 2019-04-04 RX ADMIN — SODIUM CHLORIDE: 9 INJECTION, SOLUTION INTRAVENOUS at 11:50

## 2019-04-04 RX ADMIN — SIMVASTATIN 20 MG: 40 TABLET, FILM COATED ORAL at 18:33

## 2019-04-04 RX ADMIN — MORPHINE SULFATE 1 MG: 4 INJECTION INTRAVENOUS at 11:23

## 2019-04-04 RX ADMIN — FAMOTIDINE 20 MG: 20 TABLET ORAL at 18:33

## 2019-04-04 RX ADMIN — ONDANSETRON 4 MG: 2 INJECTION INTRAMUSCULAR; INTRAVENOUS at 02:57

## 2019-04-04 RX ADMIN — ENOXAPARIN SODIUM 40 MG: 100 INJECTION SUBCUTANEOUS at 06:57

## 2019-04-04 ASSESSMENT — ENCOUNTER SYMPTOMS
BRUISES/BLEEDS EASILY: 0
DIZZINESS: 0
SHORTNESS OF BREATH: 0
MUSCULOSKELETAL NEGATIVE: 1
NERVOUS/ANXIOUS: 1
HEADACHES: 0
WEIGHT LOSS: 0
WEAKNESS: 0
FEVER: 0
PALPITATIONS: 0
CHILLS: 0
MYALGIAS: 0
NAUSEA: 0
CARDIOVASCULAR NEGATIVE: 1
SORE THROAT: 0
FOCAL WEAKNESS: 0
BLURRED VISION: 0
NEUROLOGICAL NEGATIVE: 1
DEPRESSION: 0
COUGH: 0
EYES NEGATIVE: 1
ABDOMINAL PAIN: 1
DIAPHORESIS: 0
CONSTITUTIONAL NEGATIVE: 1
VOMITING: 0
RESPIRATORY NEGATIVE: 1

## 2019-04-04 ASSESSMENT — LIFESTYLE VARIABLES: SUBSTANCE_ABUSE: 0

## 2019-04-04 NOTE — PROGRESS NOTES
Assumed care of patient, bedside report received from nightshift RN. Updated on POC, call light within reach and fall precautions in place. Bed locked and in lowest position. Patient instructed to call for assistance before getting out of bed. All questions answered, no other needs at this time.

## 2019-04-04 NOTE — CARE PLAN
Problem: Communication  Goal: The ability to communicate needs accurately and effectively will improve  Outcome: PROGRESSING AS EXPECTED    Intervention: [place] patient and significant other/support system to call light to alert staff of needs  Patient oriented to call light system and has been able to communicate needs accurately and effectively.      Problem: Safety  Goal: Will remain free from injury  Outcome: PROGRESSING AS EXPECTED    Intervention: Provide assistance with mobility  Patient has been provided assistance with mobility throughout the shift and has remained free from injury.

## 2019-04-04 NOTE — PROGRESS NOTES
[facility] Medicine Daily Progress Note    Date of Service  4/4/2019    Chief Complaint  78 y.o. female admitted 4/2/2019 with abdominal pain and facial droop    Hospital Course   Patient is an 78 y.o. female with PMHx HTN, depression, CVA who presented 4/2/2019 with abdominal pain. She had just been discharged after having been admitted for acute pancreatitis. She called [facility] and they noted slurred speech, facial droop and LUE drift. Her BP at that time was in the 220s. She presented to [facility] as a code stroke. Her CT head was negative and CTA brain and neck without any occlusion. She was not given TPA d/t improvement in symptoms          Interval Problem Update  Abdominal pain improved, exam stable, pain currently 3-4/10 she is upset that the iv morphine will be tapered off   Trial of clears  No n/v  She is upset with nursing staff - feels people are lying to her, she is anxious  Axox4  No further neuro symptoms  Ros otherwise negative    Consultants/Specialty      Code Status  full    Disposition  tbd    Review of Systems  Review of Systems   Constitutional: Negative.  Negative for chills, diaphoresis, fever, malaise/fatigue and weight loss.   HENT: Negative.  Negative for sore throat.    Eyes: Negative.  Negative for blurred vision.   Respiratory: Negative.  Negative for cough and shortness of breath.    Cardiovascular: Negative.  Negative for chest pain, palpitations and leg swelling.   Gastrointestinal: Positive for abdominal pain. Negative for nausea and vomiting.   Genitourinary: Negative.  Negative for dysuria.   Musculoskeletal: Negative.  Negative for myalgias.   Skin: Negative.  Negative for itching and rash.   Neurological: Negative.  Negative for dizziness, focal weakness, weakness and headaches.   Endo/Heme/Allergies: Negative.  Does not bruise/bleed easily.   Psychiatric/Behavioral: Negative for depression, substance abuse and suicidal ideas. The patient is nervous/anxious.    All other systems  reviewed and are negative.       Physical Exam  Temp:  [36.9 °C (98.5 °F)-37.3 °C (99.1 °F)] 37 °C (98.6 °F)  Pulse:  [76-95] 76  Resp:  [16-19] 16  BP: (122-176)/(76-92) 154/77  SpO2:  [90 %-98 %] 98 %    Physical Exam   Constitutional: She is oriented to person, place, and time. She appears well-developed and well-nourished. No distress.   HENT:   Head: Normocephalic and atraumatic.   Mouth/Throat: Oropharynx is clear and moist.   Eyes: Conjunctivae are normal.   Neck: Normal range of motion. Neck supple.   Cardiovascular: Normal rate, normal heart sounds and intact distal pulses.  Exam reveals no gallop and no friction rub.    No murmur heard.  Pulmonary/Chest: Effort normal and breath sounds normal. No respiratory distress. She has no wheezes. She has no rales. She exhibits no tenderness.   Abdominal: Soft. Bowel sounds are normal. She exhibits no distension and no mass. There is no tenderness. There is no rebound and no guarding.   Musculoskeletal: Normal range of motion. She exhibits no edema or tenderness.   Neurological: She is alert and oriented to person, place, and time. She has normal reflexes. No cranial nerve deficit. She exhibits normal muscle tone. Coordination normal.   Skin: Skin is warm and dry. No rash noted. She is not diaphoretic. No erythema. No pallor.   Psychiatric: She has a normal mood and affect. Her behavior is normal. Judgment and thought content normal.   Nursing note and vitals reviewed.      Fluids  No intake or output data in the 24 hours ending 04/04/19 1239    Laboratory  Recent Labs      04/02/19   1227  04/03/19   0012  04/04/19   0554   WBC  8.7  10.2  11.4*   RBC  4.37  3.71*  3.98*   HEMOGLOBIN  12.6  11.8*  11.1*   HEMATOCRIT  38.1  33.3*  35.2*   MCV  87.2  89.8  88.4   MCH  28.8  31.8  27.9   MCHC  33.1*  35.4*  31.5*   RDW  43.6  43.9  45.2   PLATELETCT  221  196  207   MPV  9.7  9.8  9.5     Recent Labs      04/02/19   1227  04/03/19   0444  04/04/19   0554   SODIUM  140   139  138   POTASSIUM  3.3*  3.4*  3.4*   CHLORIDE  104  103  103   CO2  27  28  25   GLUCOSE  135*  99  64*   BUN  5*  5*  8   CREATININE  0.74  0.74  0.60   CALCIUM  8.6  8.0*  7.7*     Recent Labs      04/02/19   1227   APTT  30.6   INR  1.12         Recent Labs      04/03/19   0444   TRIGLYCERIDE  126   HDL  29*   LDL  44       Imaging  EC-ECHOCARDIOGRAM COMPLETE W/O CONT   Final Result      MR-BRAIN-W/O   Final Result      1.  Moderate supratentorial white matter disease most consistent with microvascular ischemic change. Common findings for the patient's age.   2.  No evidence of acute infarction, hemorrhage, or mass lesion.      CT-ABDOMEN-PELVIS W/O   Final Result         1.  Inflammatory change surrounding the pancreatic head and body which may represent pancreatitis.      2.  Minimal right pleural effusion and minimal atelectasis or pneumonia in the right lower lobe.      3.  Diffuse fatty infiltration of the liver. No biliary dilatation.      4.  The gallbladder is absent.            DX-CHEST-PORTABLE (1 VIEW)   Final Result      Stable prominence of the cardiomediastinal silhouette.      Atherosclerotic plaque.      Mild right perihilar atelectasis.      Stable elevation of the right hemidiaphragm.      CT-CTA NECK WITH & W/O-POST PROCESSING   Final Result      CT angiogram of the neck within normal limits for age.      CT-CTA HEAD WITH & W/O-POST PROCESS   Final Result      No thrombosis is seen within the [place] of [place].         CT-CEREBRAL PERFUSION ANALYSIS   Final Result      1.  Cerebral blood flow less than 30% likely representing completed infarct = 0 mL.      2.  T Max more than 6 seconds likely representing combination of completed infarct and ischemia = 0 mL.      3.  Mismatched volume likely representing ischemic brain/penumbra = None      4.  Please note that the cerebral perfusion was performed on the limited brain tissue around the basal ganglia region. Infarct/ischemia outside the CT  perfusion sections can be missed in this study.      CT-HEAD W/O   Final Result      No acute intracranial abnormality is identified.      Mild atrophy      There are periventricular and subcortical white matter changes present.  This finding is nonspecific and could be from previous small vessel ischemia, demyelination, or gliosis.      Paranasal sinus disease.              Assessment/Plan  * TIA (transient ischemic attack)- (present on admission)   Assessment & Plan    CT head, CTA head and neck, CTP within normal limits.   CT head reviewed by me with no intracranial bleed  Seen by neuro  No evidence of cva on mri  Neuro exam stable  Continue aspirin and statin  No significant findings on echo  Unclear if was tia or related to htn       Hypertensive emergency   Assessment & Plan    Chronic ace may be contributing to pancreatitis so will not resume  Trial of norvasc  following     Elevated troponin   Assessment & Plan    Likely d/t hypertensive emergency  EKG wnl  Minimally elevated and decreasing     AP (abdominal pain)   Assessment & Plan    Recent w/u for pancreatitis, lipase now wnl but  CT abdo showing inflamed pancreas  Improving  Exam much improved  Trial of clears, wean off iv pain meds     Hypokalemia- (present on admission)   Assessment & Plan    Mag low - replacing  Continue potassium replacement  following          VTE prophylaxis: lovenox

## 2019-04-05 VITALS
DIASTOLIC BLOOD PRESSURE: 87 MMHG | BODY MASS INDEX: 31.48 KG/M2 | OXYGEN SATURATION: 93 % | RESPIRATION RATE: 17 BRPM | HEART RATE: 87 BPM | SYSTOLIC BLOOD PRESSURE: 145 MMHG | TEMPERATURE: 98.3 F | WEIGHT: 183.42 LBS

## 2019-04-05 PROBLEM — R79.89 ELEVATED TROPONIN: Status: RESOLVED | Noted: 2019-04-02 | Resolved: 2019-04-05

## 2019-04-05 PROBLEM — I16.1 HYPERTENSIVE EMERGENCY: Status: RESOLVED | Noted: 2019-04-02 | Resolved: 2019-04-05

## 2019-04-05 LAB
ANION GAP SERPL CALC-SCNC: 9 MMOL/L (ref 0–11.9)
BASOPHILS # BLD AUTO: 0.5 % (ref 0–1.8)
BASOPHILS # BLD: 0.06 K/UL (ref 0–0.12)
BUN SERPL-MCNC: 6 MG/DL (ref 8–22)
CALCIUM SERPL-MCNC: 7.9 MG/DL (ref 8.5–10.5)
CHLORIDE SERPL-SCNC: 103 MMOL/L (ref 96–112)
CO2 SERPL-SCNC: 23 MMOL/L (ref 20–33)
CREAT SERPL-MCNC: 0.6 MG/DL (ref 0.5–1.4)
EOSINOPHIL # BLD AUTO: 0.12 K/UL (ref 0–0.51)
EOSINOPHIL NFR BLD: 0.9 % (ref 0–6.9)
ERYTHROCYTE [DISTWIDTH] IN BLOOD BY AUTOMATED COUNT: 44.2 FL (ref 35.9–50)
GLUCOSE SERPL-MCNC: 106 MG/DL (ref 65–99)
HCT VFR BLD AUTO: 34 % (ref 37–47)
HGB BLD-MCNC: 11.5 G/DL (ref 12–16)
IMM GRANULOCYTES # BLD AUTO: 0.19 K/UL (ref 0–0.11)
IMM GRANULOCYTES NFR BLD AUTO: 1.5 % (ref 0–0.9)
LYMPHOCYTES # BLD AUTO: 1.4 K/UL (ref 1–4.8)
LYMPHOCYTES NFR BLD: 10.7 % (ref 22–41)
MCH RBC QN AUTO: 29 PG (ref 27–33)
MCHC RBC AUTO-ENTMCNC: 33.8 G/DL (ref 33.6–35)
MCV RBC AUTO: 85.6 FL (ref 81.4–97.8)
MONOCYTES # BLD AUTO: 0.69 K/UL (ref 0–0.85)
MONOCYTES NFR BLD AUTO: 5.3 % (ref 0–13.4)
NEUTROPHILS # BLD AUTO: 10.59 K/UL (ref 2–7.15)
NEUTROPHILS NFR BLD: 81.1 % (ref 44–72)
NRBC # BLD AUTO: 0.02 K/UL
NRBC BLD-RTO: 0.2 /100 WBC
PLATELET # BLD AUTO: 207 K/UL (ref 164–446)
PMV BLD AUTO: 9.8 FL (ref 9–12.9)
POTASSIUM SERPL-SCNC: 3.2 MMOL/L (ref 3.6–5.5)
RBC # BLD AUTO: 3.97 M/UL (ref 4.2–5.4)
SODIUM SERPL-SCNC: 135 MMOL/L (ref 135–145)
WBC # BLD AUTO: 13.1 K/UL (ref 4.8–10.8)

## 2019-04-05 PROCEDURE — 85025 COMPLETE CBC W/AUTO DIFF WBC: CPT

## 2019-04-05 PROCEDURE — 700102 HCHG RX REV CODE 250 W/ 637 OVERRIDE(OP): Performed by: HOSPITALIST

## 2019-04-05 PROCEDURE — A9270 NON-COVERED ITEM OR SERVICE: HCPCS | Performed by: HOSPITALIST

## 2019-04-05 PROCEDURE — 99239 HOSP IP/OBS DSCHRG MGMT >30: CPT | Performed by: HOSPITALIST

## 2019-04-05 PROCEDURE — 80048 BASIC METABOLIC PNL TOTAL CA: CPT

## 2019-04-05 PROCEDURE — 36415 COLL VENOUS BLD VENIPUNCTURE: CPT

## 2019-04-05 PROCEDURE — 700111 HCHG RX REV CODE 636 W/ 250 OVERRIDE (IP): Performed by: HOSPITALIST

## 2019-04-05 RX ORDER — AMLODIPINE BESYLATE 5 MG/1
5 TABLET ORAL DAILY
Qty: 30 TAB | Refills: 0 | Status: SHIPPED | OUTPATIENT
Start: 2019-04-06

## 2019-04-05 RX ORDER — SIMVASTATIN 20 MG
20 TABLET ORAL EVERY EVENING
Qty: 30 TAB | Refills: 0 | Status: SHIPPED | OUTPATIENT
Start: 2019-04-05

## 2019-04-05 RX ORDER — AMLODIPINE BESYLATE 5 MG/1
5 TABLET ORAL
Status: DISCONTINUED | OUTPATIENT
Start: 2019-04-06 | End: 2019-04-05 | Stop reason: HOSPADM

## 2019-04-05 RX ADMIN — ASPIRIN 325 MG: 325 TABLET ORAL at 05:29

## 2019-04-05 RX ADMIN — FAMOTIDINE 20 MG: 20 TABLET ORAL at 05:29

## 2019-04-05 RX ADMIN — ONDANSETRON 4 MG: 2 INJECTION INTRAMUSCULAR; INTRAVENOUS at 05:38

## 2019-04-05 RX ADMIN — ENOXAPARIN SODIUM 40 MG: 100 INJECTION SUBCUTANEOUS at 05:29

## 2019-04-05 RX ADMIN — AMLODIPINE BESYLATE 2.5 MG: 5 TABLET ORAL at 05:29

## 2019-04-05 RX ADMIN — ACETAMINOPHEN 650 MG: 325 TABLET, FILM COATED ORAL at 05:38

## 2019-04-05 NOTE — DISCHARGE SUMMARY
Discharge Summary    CHIEF COMPLAINT ON ADMISSION  Chief Complaint   Patient presents with   • Facial Droop   • Slurred Speech   • Abdominal Pain   • N/V   • Headache       Reason for Admission  Stroke     Admission Date  4/2/2019    CODE STATUS  Full Code    HPI & HOSPITAL COURSE  This is a 78 y.o. female here with a past medical history of HTN, depression, CVA who presented 4/2/2019 with abdominal pain. She had just been discharged after having been admitted for acute pancreatitis. [facility] slurred speech, facial droop and LUE drift. Her BP at that time was in the 220s. She presented to [facility] as a code stroke. Her CT head was negative and CTA brain and neck without any occlusion. She was not given TPA d/t improvement in symptoms.      Patient had no further neurologic complaints or signs throughout hospital stay.  MRI and echo were unremarkable.  She was followed by neurology, and was thought to have had either a TIA or hypertensive urgency. She had a minimally elevated troponin that decreased but no ekg changes and no significant findings on echo - this was consistent with presenting hypertensive urgency. Her blood pressure is now greatly improved, her ACE was stopped as it may have contributed to her pancreatitis and she was changed over to norvasc.  Patient reported ongoing abdominal pain from the pancreatitis she had recently been admitted for, lipase was normal and Ct showed some pancreatic inflammation, unclear if this was a lag in radiological resolution.  Her serial abdominal exams were stable after admission and she was tolerating a bland diet without difficulty at discharge.    She was challenging to treat because she refused most interventions and was insulting and verbally abusive to most staff members including myself.  I had a long discussion with patient's son and his wife, he apologized, telling me his mother made several staff members cry.  He states that her behavior has  been like this for at least several years.  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  I have advised that patient follow up with her pcp and if the GI symptoms persist that an outpatient GI referral should be considered. She also refused potassium replacement so a high potassium diet was recommended with outpatient follow up labs.         Therefore, she is discharged in fair and stable condition to home with close outpatient follow-up.    The patient met 2-midnight criteria for an inpatient stay at the time of discharge.    Discharge Date  4/5/19    FOLLOW UP ITEMS POST DISCHARGE  [facility]  PCP    DISCHARGE DIAGNOSES  Principal Problem:    TIA (transient ischemic attack) POA: Yes  Active Problems:    Hypokalemia POA: Yes    AP (abdominal pain) POA: Unknown  Resolved Problems:    Hypertensive emergency POA: Unknown    Elevated troponin POA: Unknown      FOLLOW UP  No future appointments.  [facility]  [address] [facility] [address]  [phone]  In 1 week      William Coleman M.D.  [address] [place] [address] [address] [place] [id number]  [phone]    In 1 week        MEDICATIONS ON DISCHARGE     Medication List      START taking these medications      Instructions   amLODIPine 5 MG Tabs  Start taking on:  4/6/2019  Commonly known as:  NORVASC   Doctor's comments:  Hold sbp less 120  Take 1 Tab by mouth every day.  Dose:  5 mg     simvastatin 20 MG Tabs  Commonly known as:  ZOCOR   Take 1 Tab by mouth every evening.  Dose:  20 mg        CONTINUE taking these medications      Instructions   aspirin EC 81 MG Tbec  Commonly known as:  ECOTRIN   Take 81 mg by mouth 2 Times a Day.  Dose:  81 mg     busPIRone 15 MG tablet  Commonly known as:  BUSPAR   Take 15 mg by mouth 3 times a day as needed.  Dose:  15 mg     tamsulosin 0.4 MG capsule  Commonly known as:  FLOMAX   Take 1 Cap by mouth  "ONE-HALF HOUR AFTER BREAKFAST.  Dose:  0.4 mg     vitamin D (Ergocalciferol) 32508 units Caps capsule  Commonly known as:  DRISDOL   Take 50,000 Units by mouth every Saturday. saturday  Dose:  77361 Units        STOP taking these medications    lisinopril 10 MG Tabs  Commonly known as:  PRINIVIL            Allergies  Allergies   Allergen Reactions   • Amitriptyline    • Ciprofloxacin    • Codeine    • Hydralazine      Face swelling   • Other Drug      Pt states she is allergic to many antibiotics but she does not remember what they are.   • Pcn [Penicillins]    • Potassium Shortness of Breath     \"I feel like I can't breath\".  Has tolerate IV KCl in past   • Xanax [Alprazolam]    • Zoloft    • Zyrtec [Cetirizine]        DIET  Orders Placed This Encounter   Procedures   • Diet Order Low Fiber(GI Soft)     Standing Status:   Standing     Number of Occurrences:   1     Order Specific Question:   Diet:     Answer:   Low Fiber(GI Soft) [2]       ACTIVITY  As tolerated.    CONSULTATIONS  Neurology    PROCEDURES  EC-ECHOCARDIOGRAM COMPLETE W/O CONT   Final Result      MR-BRAIN-W/O   Final Result      1.  Moderate supratentorial white matter disease most consistent with microvascular ischemic change. Common findings for the patient's age.   2.  No evidence of acute infarction, hemorrhage, or mass lesion.      CT-ABDOMEN-PELVIS W/O   Final Result         1.  Inflammatory change surrounding the pancreatic head and body which may represent pancreatitis.      2.  Minimal right pleural effusion and minimal atelectasis or pneumonia in the right lower lobe.      3.  Diffuse fatty infiltration of the liver. No biliary dilatation.      4.  The gallbladder is absent.            DX-CHEST-PORTABLE (1 VIEW)   Final Result      Stable prominence of the cardiomediastinal silhouette.      Atherosclerotic plaque.      Mild right perihilar atelectasis.      Stable elevation of the right hemidiaphragm.      CT-CTA NECK WITH & W/O-POST " PROCESSING   Final Result      CT angiogram of the neck within normal limits for age.      CT-CTA HEAD WITH & W/O-POST PROCESS   Final Result      No thrombosis is seen within the [place] of [place].         CT-CEREBRAL PERFUSION ANALYSIS   Final Result      1.  Cerebral blood flow less than 30% likely representing completed infarct = 0 mL.      2.  T Max more than 6 seconds likely representing combination of completed infarct and ischemia = 0 mL.      3.  Mismatched volume likely representing ischemic brain/penumbra = None      4.  Please note that the cerebral perfusion was performed on the limited brain tissue around the basal ganglia region. Infarct/ischemia outside the CT perfusion sections can be missed in this study.      CT-HEAD W/O   Final Result      No acute intracranial abnormality is identified.      Mild atrophy      There are periventricular and subcortical white matter changes present.  This finding is nonspecific and could be from previous small vessel ischemia, demyelination, or gliosis.      Paranasal sinus disease.               LABORATORY  Lab Results   Component Value Date    SODIUM 135 04/05/2019    POTASSIUM 3.2 (L) 04/05/2019    CHLORIDE 103 04/05/2019    CO2 23 04/05/2019    GLUCOSE 106 (H) 04/05/2019    BUN 6 (L) 04/05/2019    CREATININE 0.60 04/05/2019    CREATININE 0.9 11/19/2008        Lab Results   Component Value Date    WBC 13.1 (H) 04/05/2019    HEMOGLOBIN 11.5 (L) 04/05/2019    HEMATOCRIT 34.0 (L) 04/05/2019    PLATELETCT 207 04/05/2019        Total time of the discharge process exceeds 45 minutes.

## 2019-04-05 NOTE — PROGRESS NOTES
DISCHARGE NOTE    Patient discharged home no services with walker. Signed discharge paperwork, scripts have been sent to pharmacy. IV removed. Family here to drive her home. Left with all personal belongings via wheelchair

## 2019-04-05 NOTE — DISCHARGE INSTRUCTIONS
Discharge Instructions    Discharged to home by car with relative. Discharged via wheelchair, hospital escort: Yes.  Special equipment needed: Not Applicable    Be sure to schedule a follow-up appointment with your primary care doctor or any specialists as instructed.     Discharge Plan:   Diet Plan: Discussed  Activity Level: Discussed  Confirmed Follow up Appointment: Appointment Scheduled  Confirmed Symptoms Management: Discussed  Medication Reconciliation Updated: Yes  Influenza Vaccine Indication: Not indicated: Previously immunized this influenza season and > 8 years of age    I understand that a diet low in cholesterol, fat, and sodium is recommended for good health. Unless I have been given specific instructions below for another diet, I accept this instruction as my diet prescription.   Other diet: High potassium diet     Special Instructions: None    · Is patient discharged on Warfarin / Coumadin?   No     Potassium Content of Foods  Potassium is a mineral found in many foods and drinks. It helps keep fluids and minerals balanced in your body and affects how steadily your heart beats. Potassium also helps control your blood pressure and keep your muscles and nervous system healthy.  Certain health conditions and medicines may change the balance of potassium in your body. When this happens, you can help balance your level of potassium through the foods that you do or do not eat. Your health care provider or dietitian may recommend an amount of potassium that you should have each day. The following lists of foods provide the amount of potassium (in parentheses) per serving in each item.  High in potassium  The following foods and beverages have 200 mg or more of potassium per serving:  · Apricots, 2 raw or 5 dry (200 mg).  · Artichoke, 1 medium (345 mg).  · Avocado, raw, ¼ each (245 mg).  · Banana, 1 medium (425 mg).  · Beans, lima, or baked beans, canned, ½ cup (280 mg).  · Beans, white, canned, ½ cup (595  mg).  · Beef roast, 3 oz (320 mg).  · Beef, ground, 3 oz (270 mg).  · Beets, raw or cooked, ½ cup (260 mg).  · Bran muffin, 2 oz (300 mg).  · Broccoli, ½ cup (230 mg).  · [place] sprouts, ½ cup (250 mg).  · Cantaloupe, ½ cup (215 mg).  · Cereal, 100% bran, ½ cup (200-400 mg).  · Cheeseburger, single, fast food, 1 each (225-400 mg).  · Chicken, 3 oz (220 mg).  · Clams, canned, 3 oz (535 mg).  · Crab, 3 oz (225 mg).  · Dates, 5 each (270 mg).  · Dried beans and peas, ½ cup (300-475 mg).  · Figs, dried, 2 each (260 mg).  · Fish: halibut, tuna, cod, snapper, 3 oz (480 mg).  · Fish: salmon, la, swordfish, perch, 3 oz (300 mg).  · Fish, tuna, canned 3 oz (200 mg).  · French fries, fast food, 3 oz (470 mg).  · Granola with fruit and nuts, ½ cup (200 mg).  · Grapefruit juice, ½ cup (200 mg).  · Greens, beet, ½ cup (655 mg).  · Honeydew melon, ½ cup (200 mg).  · Kale, raw, 1 cup (300 mg).  · Kiwi, 1 medium (240 mg).  · Kohlrabi, rutabaga, parsnips, ½ cup (280 mg).  · Lentils, ½ cup (365 mg).  · Nelson, 1 each (325 mg).  · Milk, chocolate, 1 cup (420 mg).  · Milk: nonfat, low-fat, whole, buttermilk, 1 cup (350-380 mg).  · Molasses, 1 Tbsp (295 mg).  · Mushrooms, ½ cup (280) mg.  · Nectarine, 1 each (275 mg).  · Nuts: almonds, peanuts, hazelnuts, [place], cashew, mixed, 1 oz (200 mg).  · Nuts, pistachios, 1 oz (295 mg).  · Orange, 1 each (240 mg).  · Orange juice, ½ cup (235 mg).  · Papaya, medium, ½ fruit (390 mg).  · Peanut butter, chunky, 2 Tbsp (240 mg).  · Peanut butter, smooth, 2 Tbsp (210 mg).  · Pear, 1 medium (200 mg).  · Pomegranate, 1 whole (400 mg).  · Pomegranate juice, ½ cup (215 mg).  · Pork, 3 oz (350 mg).  · Potato chips, salted, 1 oz (465 mg).  · Potato, baked with skin, 1 medium (925 mg).  · Potatoes, boiled, ½ cup (255 mg).  · Potatoes, mashed, ½ cup (330 mg).  · Prune juice, ½ cup (370 mg).  · Prunes, 5 each (305 mg).  · Pudding, chocolate, ½ cup (230 mg).  · Pumpkin, canned, ½ cup (250  mg).  · Raisins, seedless, ¼ cup (270 mg).  · Seeds, sunflower or pumpkin, 1 oz (240 mg).  · Soy milk, 1 cup (300 mg).  · Spinach, ½ cup (420 mg).  · Spinach, canned, ½ cup (370 mg).  · Sweet potato, baked with skin, 1 medium (450 mg).  · Swiss chard, ½ cup (480 mg).  · Tomato or vegetable juice, ½ cup (275 mg).  · Tomato sauce or puree, ½ cup (400-550 mg).  · Tomato, raw, 1 medium (290 mg).  · Tomatoes, canned, ½ cup (200-300 mg).  · Turkey, 3 oz (250 mg).  · Wheat germ, 1 oz (250 mg).  · Winter squash, ½ cup (250 mg).  · Yogurt, plain or fruited, 6 oz (260-435 mg).  · Zucchini, ½ cup (220 mg).  Moderate in potassium  The following foods and beverages have  mg of potassium per serving:  · Apple, 1 each (150 mg).  · Apple juice, ½ cup (150 mg).  · Applesauce, ½ cup (90 mg).  · Apricot nectar, ½ cup (140 mg).  · Asparagus, small bennett, ½ cup or 6 bennett (155 mg).  · Bagel, cinnamon raisin, 1 each (130 mg).  · Bagel, egg or plain, 4 in., 1 each (70 mg).  · Beans, green, ½ cup (90 mg).  · Beans, yellow, ½ cup (190 mg).  · Beer, regular, 12 oz (100 mg).  · Beets, canned, ½ cup (125 mg).  · Blackberries, ½ cup (115 mg).  · Blueberries, ½ cup (60 mg).  · Bread, whole wheat, 1 slice (70 mg).  · Broccoli, raw, ½ cup (145 mg).  · Cabbage, ½ cup (150 mg).  · Carrots, cooked or raw, ½ cup (180 mg).  · Cauliflower, raw, ½ cup (150 mg).  · Celery, raw, ½ cup (155 mg).  · Cereal, bran flakes, ½cup (120-150 mg).  · Cheese, cottage, ½ cup (110 mg).  · Cherries, 10 each (150 mg).  · Chocolate, 1½ oz bar (165 mg).  · Coffee, brewed 6 oz (90 mg).  · Corn, ½ cup or 1 ear (195 mg).  · Cucumbers, ½ cup (80 mg).  · Egg, large, 1 each (60 mg).  · Eggplant, ½ cup (60 mg).  · Endive, raw, ½cup (80 mg).  · English muffin, 1 each (65 mg).  · Fish, orange roughy, 3 oz (150 mg).  · Frankfurter, beef or pork, 1 each (75 mg).  · Fruit cocktail, ½ cup (115 mg).  · Grape juice, ½ cup (170 mg).  · Grapefruit, ½ fruit (175 mg).  · Grapes, ½ cup  (155 mg).  · Greens: kale, turnip, nicole, ½ cup (110-150 mg).  · Ice cream or frozen yogurt, chocolate, ½ cup (175 mg).  · Ice cream or frozen yogurt, vanilla, ½ cup (120-150 mg).  · Antonio, limes, 1 each (80 mg).  · Lettuce, all types, 1 cup (100 mg).  · Mixed vegetables, ½ cup (150 mg).  · Mushrooms, raw, ½ cup (110 mg).  · Nuts: walnuts, pecans, or macadamia, 1 oz (125 mg).  · Oatmeal, ½ cup (80 mg).  · Okra, ½ cup (110 mg).  · Onions, raw, ½ cup (120 mg).  · Peach, 1 each (185 mg).  · Peaches, canned, ½ cup (120 mg).  · Pears, canned, ½ cup (120 mg).  · Peas, green, frozen, ½ cup (90 mg).  · Peppers, green, ½ cup (130 mg).  · Peppers, red, ½ cup (160 mg).  · Pineapple juice, ½ cup (165 mg).  · Pineapple, fresh or canned, ½ cup (100 mg).  · Plums, 1 each (105 mg).  · Pudding, vanilla, ½ cup (150 mg).  · Raspberries, ½ cup (90 mg).  · Rhubarb, ½ cup (115 mg).  · Rice, wild, ½ cup (80 mg).  · Shrimp, 3 oz (155 mg).  · Spinach, raw, 1 cup (170 mg).  · Strawberries, ½ cup (125 mg).  · Summer squash ½ cup (175-200 mg).  · Swiss chard, raw, 1 cup (135 mg).  · Tangerines, 1 each (140 mg).  · Tea, brewed, 6 oz (65 mg).  · Turnips, ½ cup (140 mg).  · Watermelon, ½ cup (85 mg).  · Wine, red, table, 5 oz (180 mg).  · Wine, white, table, 5 oz (100 mg).  Low in potassium  The following foods and beverages have less than 50 mg of potassium per serving.  · Bread, white, 1 slice (30 mg).  · Carbonated beverages, 12 oz (less than 5 mg).  · Cheese, 1 oz (20-30 mg).  · Cranberries, ½ cup (45 mg).  · Cranberry juice cocktail, ½ cup (20 mg).  · Fats and oils, 1 Tbsp (less than 5 mg).  · Hummus, 1 Tbsp (32 mg).  · Nectar: papaya, marlene, or pear, ½ cup (35 mg).  · Rice, white or brown, ½ cup (50 mg).  · Spaghetti or macaroni, ½ cup cooked (30 mg).  · Tortilla, flour or corn, 1 each (50 mg).  · Waffle, 4 in., 1 each (50 mg).  · Water chestnuts, ½ cup (40 mg).  This information is not intended to replace advice given to you by your  "health care provider. Make sure you discuss any questions you have with your health care provider.  Document Released: 08/01/2006 Document Revised: 05/25/2017 Document Reviewed: 11/14/2014  [employer] Interactive Patient Education © 2017 [employer]      Transient Ischemic Attack  A transient ischemic attack (TIA) is a \"warning stroke\" that causes stroke-like symptoms. A TIA does not cause lasting damage to the brain. The symptoms of a TIA can happen fast and do not last long. It is important to know the symptoms of a TIA and what to do. This can help prevent stroke or death.  Follow these instructions at home:  · Take medicines only as told by your doctor. Make sure you understand all of the instructions.  · You may need to take aspirin or warfarin medicine. Warfarin needs to be taken exactly as told.  ¨ Taking too much or too little warfarin is dangerous. Blood tests must be done as often as told by your doctor. A PT blood test measures how long it takes for blood to clot. Your PT is used to calculate another value called an INR. Your PT and INR help your doctor adjust your warfarin dosage. He or she will make sure you are taking the right amount.  ¨ Food can cause problems with warfarin and affect the results of your blood tests. This is true for foods high in vitamin K. Eat the same amount of foods high in vitamin K each day. Foods high in vitamin K include spinach, kale, broccoli, cabbage, nicole and turnip greens, Energy sprouts, peas, cauliflower, seaweed, and parsley. Other foods high in vitamin K include beef and pork liver, green tea, and soybean oil. Eat the same amount of foods high in vitamin K each day. Avoid big changes in your diet. Tell your doctor before changing your diet. Talk to a  (dietitian) if you have questions.  ¨ Many medicines can cause problems with warfarin and affect your PT and INR. Tell your doctor about all medicines you take. This includes vitamins and dietary " pills (supplements). Do not take or stop taking any prescribed or over-the-counter medicines unless your doctor tells you to.  ¨ Warfarin can cause more bruising or bleeding. Hold pressure over any cuts for longer than normal. Talk to your doctor about other side effects of warfarin.  ¨ Avoid sports or activities that may cause injury or bleeding.  ¨ Be careful when you shave, floss, or use sharp objects.  ¨ Avoid or drink very little alcohol while taking warfarin. Tell your doctor if you change how much alcohol you drink.  ¨ Tell your dentist and other doctors that you take warfarin before any procedures.  · Follow your diet program as told, if you are given one.  · Keep a healthy weight.  · Stay active. Try to get at least 30 minutes of activity on all or most days.  · Do not use any tobacco products, including cigarettes, chewing tobacco, or electronic cigarettes. If you need help quitting, ask your doctor.  · Limit alcohol intake to no more than 1 drink per day for nonpregnant women and 2 drinks per day for men. One drink equals 12 ounces of beer, 5 ounces of wine, or 1½ ounces of hard liquor.  · Do not abuse drugs.  · Keep your home safe so you do not fall. You can do this by:  ¨ Putting grab bars in the bedroom and bathroom.  ¨ Raising toilet seats.  ¨ Putting a seat in the shower.  · Keep all follow-up visits as told by your doctor. This is important.  Contact a doctor if:  · Your personality changes.  · You have trouble swallowing.  · You have double vision.  · You are dizzy.  · You have a fever.  Get help right away if:  These symptoms may be an emergency. Do not wait to see if the symptoms will go away. Get medical help right away. Call your local emergency services (911 in the [place] Do not drive yourself to the hospital.  · You have sudden weakness or lose feeling (go numb), especially on one side of the body. This can affect your:  ¨ Face.  ¨ Arm.  ¨ Leg.  · You have sudden trouble walking.  · You have  sudden trouble moving your arms or legs.  · You have sudden confusion.  · You have trouble talking.  · You have trouble understanding.  · You have sudden trouble seeing in one or both eyes.  · You lose your balance.  · Your movements are not smooth.  · You have a sudden, very bad headache with no known cause.  · You have new chest pain.  · Your heartbeat is unsteady.  · You are partly or totally unaware of what is going on around you.  This information is not intended to replace advice given to you by your health care provider. Make sure you discuss any questions you have with your health care provider.  Document Released: 09/26/2009 Document Revised: 08/21/2017 Document Reviewed: 03/25/2015  [employer] Interactive Patient Education © 2017 [employer]    Stroke Prevention  Some medical conditions and behaviors are associated with an increased chance of having a stroke. You may prevent a stroke by making healthy choices and managing medical conditions.  How can I reduce my risk of having a stroke?  · Stay physically active. Get at least 30 minutes of activity on most or all days.  · Do not smoke. It may also be helpful to avoid exposure to secondhand smoke.  · Limit alcohol use. Moderate alcohol use is considered to be:  ¨ No more than 2 drinks per day for men.  ¨ No more than 1 drink per day for nonpregnant women.  · Eat healthy foods. This involves:  ¨ Eating 5 or more servings of fruits and vegetables a day.  ¨ Making dietary changes that address high blood pressure (hypertension), high cholesterol, diabetes, or obesity.  · Manage your cholesterol levels.  ¨ Making food choices that are high in fiber and low in saturated fat, trans fat, and cholesterol may control cholesterol levels.  ¨ Take any prescribed medicines to control cholesterol as directed by your health care provider.  · Manage your diabetes.  ¨ Controlling your carbohydrate and sugar intake is recommended to manage diabetes.  ¨ Take any prescribed  medicines to control diabetes as directed by your health care provider.  · Control your hypertension.  ¨ Making food choices that are low in salt (sodium), saturated fat, trans fat, and cholesterol is recommended to manage hypertension.  ¨ Ask your health care provider if you need treatment to lower your blood pressure. Take any prescribed medicines to control hypertension as directed by your health care provider.  ¨ If you are 18-39 years of age, have your blood pressure checked every 3-5 years. If you are 40 years of age or older, have your blood pressure checked every year.  · Maintain a healthy weight.  ¨ Reducing calorie intake and making food choices that are low in sodium, saturated fat, trans fat, and cholesterol are recommended to manage weight.  · Stop drug abuse.  · Avoid taking birth control pills.  ¨ Talk to your health care provider about the risks of taking birth control pills if you are over 35 years old, smoke, get migraines, or have ever had a blood clot.  · Get evaluated for sleep disorders (sleep apnea).  ¨ Talk to your health care provider about getting a sleep evaluation if you snore a lot or have excessive sleepiness.  · Take medicines only as directed by your health care provider.  ¨ For some people, aspirin or blood thinners (anticoagulants) are helpful in reducing the risk of forming abnormal blood clots that can lead to stroke. If you have the irregular heart rhythm of atrial fibrillation, you should be on a blood thinner unless there is a good reason you cannot take them.  ¨ Understand all your medicine instructions.  · Make sure that other conditions (such as anemia or atherosclerosis) are addressed.  Get help right away if:  · You have sudden weakness or numbness of the face, arm, or leg, especially on one side of the body.  · Your face or eyelid droops to one side.  · You have sudden confusion.  · You have trouble speaking (aphasia) or understanding.  · You have sudden trouble seeing  "in one or both eyes.  · You have sudden trouble walking.  · You have dizziness.  · You have a loss of balance or coordination.  · You have a sudden, severe headache with no known cause.  · You have new chest pain or an irregular heartbeat.  Any of these symptoms may represent a serious problem that is an emergency. Do not wait to see if the symptoms will go away. Get medical help at once. Call your local emergency services (911 in [place] Do not drive yourself to the hospital.   This information is not intended to replace advice given to you by your health care provider. Make sure you discuss any questions you have with your health care provider.  Document Released: 01/25/2006 Document Revised: 05/25/2017 Document Reviewed: 06/20/2014  [employer] Interactive Patient Education © 2017 [employer]      Transient Ischemic Attack  A transient ischemic attack (TIA) is a \"warning stroke\" that causes stroke-like symptoms. Unlike a stroke, a TIA does not cause permanent damage to the brain. The symptoms of a TIA can happen very fast and do not last long. It is important to know the symptoms of a TIA and what to do. This can help prevent a major stroke or death.  What are the causes?  A TIA is caused by a temporary blockage in an artery in the brain or neck (carotid artery). The blockage does not allow the brain to get the blood supply it needs and can cause different symptoms. The blockage can be caused by either:  · A blood clot.  · Fatty buildup (plaque) in a neck or brain artery.  What increases the risk?  · High blood pressure (hypertension).  · High cholesterol.  · Diabetes mellitus.  · Heart disease.  · The buildup of plaque in the blood vessels (peripheral artery disease or atherosclerosis).  · The buildup of plaque in the blood vessels that provide blood and oxygen to the brain (carotid artery stenosis).  · An abnormal heart rhythm (atrial fibrillation).  · Obesity.  · Using any tobacco products, including cigarettes, " chewing tobacco, or electronic cigarettes.  · Taking oral contraceptives, especially in combination with using tobacco.  · Physical inactivity.  · A diet high in fats, salt (sodium), and calories.  · Excessive alcohol use.  · Use of illegal drugs (especially cocaine and methamphetamine).  · Being male.  · Being .  · Being over the age of 55 years.  · Family history of stroke.  · Previous history of blood clots, stroke, TIA, or heart attack.  · Sickle cell disease.  What are the signs or symptoms?  TIA symptoms are the same as a stroke but are temporary. These symptoms usually develop suddenly, or may be newly present upon waking from sleep:  · Sudden weakness or numbness of the face, arm, or leg, especially on one side of the body.  · Sudden trouble walking or difficulty moving arms or legs.  · Sudden confusion.  · Sudden personality changes.  · Trouble speaking (aphasia) or understanding.  · Difficulty swallowing.  · Sudden trouble seeing in one or both eyes.  · Double vision.  · Dizziness.  · Loss of balance or coordination.  · Sudden severe headache with no known cause.  · Trouble reading or writing.  · Loss of bowel or bladder control.  · Loss of consciousness.  How is this diagnosed?  Your health care provider may be able to determine the presence or absence of a TIA based on your symptoms, history, and physical exam. CT scan of the brain is usually performed to help identify a TIA. Other tests may include:  · Electrocardiography (ECG).  · Continuous heart monitoring.  · Echocardiography.  · Carotid ultrasonography.  · MRI.  · A scan of the brain circulation.  · Blood tests.  How is this treated?  Since the symptoms of TIA are the same as a stroke, it is important to seek treatment as soon as possible. You may need a medicine to dissolve a blood clot (thrombolytic) if that is the cause of the TIA. This medicine cannot be given if too much time has passed. Treatment may also include:  · Rest,  oxygen, fluids through an IV tube, and medicines to thin the blood (anticoagulants).  · Measures will be taken to prevent short-term and long-term complications, including infection from breathing foreign material into the lungs (aspiration pneumonia), blood clots in the legs, and falls.  · Procedures to either remove plaque in the carotid arteries or dilate carotid arteries that have narrowed due to plaque. Those procedures are:  ¨ Carotid endarterectomy.  ¨ Carotid angioplasty and stenting.  · Medicines and diet may be used to address diabetes, high blood pressure, and other underlying risk factors.  Follow these instructions at home:  · Take medicines only as directed by your health care provider. Follow the directions carefully. Medicines may be used to control risk factors for a stroke. Be sure you understand all your medicine instructions.  · You may be told to take aspirin or the anticoagulant warfarin. Warfarin needs to be taken exactly as instructed.  ¨ Taking too much or too little warfarin is dangerous. Too much warfarin increases the risk of bleeding. Too little warfarin continues to allow the risk for blood clots. While taking warfarin, you will need to have regular blood tests to measure your blood clotting time. A PT blood test measures how long it takes for blood to clot. Your PT is used to calculate another value called an INR. Your PT and INR help your health care provider to adjust your dose of warfarin. The dose can change for many reasons. It is critically important that you take warfarin exactly as prescribed.  ¨ Many foods, especially foods high in vitamin K can interfere with warfarin and affect the PT and INR. Foods high in vitamin K include spinach, kale, broccoli, cabbage, nicole and turnip greens, [place] sprouts, peas, cauliflower, seaweed, and parsley, as well as beef and pork liver, green tea, and soybean oil. You should eat a consistent amount of foods high in vitamin K. Avoid major  changes in your diet, or notify your health care provider before changing your diet. Arrange a visit with a dietitian to answer your questions.  ¨ Many medicines can interfere with warfarin and affect the PT and INR. You must tell your health care provider about any and all medicines you take; this includes all vitamins and supplements. Be especially cautious with aspirin and anti-inflammatory medicines. Do not take or discontinue any prescribed or over-the-counter medicine except on the advice of your health care provider or pharmacist.  ¨ Warfarin can have side effects, such as excessive bruising or bleeding. You will need to hold pressure over cuts for longer than usual. Your health care provider or pharmacist will discuss other potential side effects.  ¨ Avoid sports or activities that may cause injury or bleeding.  ¨ Be careful when shaving, flossing your teeth, or handling sharp objects.  ¨ Alcohol can change the body's ability to handle warfarin. It is best to avoid alcoholic drinks or consume only very small amounts while taking warfarin. Notify your health care provider if you change your alcohol intake.  ¨ Notify your dentist or other health care providers before procedures.  · Eat a diet that includes 5 or more servings of fruits and vegetables each day. This may reduce the risk of stroke. Certain diets may be prescribed to address high blood pressure, high cholesterol, diabetes, or obesity.  ¨ A diet low in sodium, saturated fat, trans fat, and cholesterol is recommended to manage high blood pressure.  ¨ A diet low in saturated fat, trans fat, and cholesterol, and high in fiber may control cholesterol levels.  ¨ A controlled-carbohydrate, controlled-sugar diet is recommended to manage diabetes.  ¨ A reduced-calorie diet that is low in sodium, saturated fat, trans fat, and cholesterol is recommended to manage obesity.  · Maintain a healthy weight.  · Stay physically active. It is recommended that you get  at least 30 minutes of activity on most or all days.  · Do not use any tobacco products, including cigarettes, chewing tobacco, or electronic cigarettes. If you need help quitting, ask your health care provider.  · Limit alcohol intake to no more than 1 drink per day for nonpregnant women and 2 drinks per day for men. One drink equals 12 ounces of beer, 5 ounces of wine, or 1½ ounces of hard liquor.  · Do not abuse drugs.  · A safe home environment is important to reduce the risk of falls. Your health care provider may arrange for specialists to evaluate your home. Having grab bars in the bedroom and bathroom is often important. Your health care provider may arrange for equipment to be used at home, such as raised toilets and a seat for the shower.  · Follow all instructions for follow-up with your health care provider. This is very important. This includes any referrals and lab tests. Proper follow-up can prevent a stroke or another TIA from occurring.  How is this prevented?  The risk of a TIA can be decreased by appropriately treating high blood pressure, high cholesterol, diabetes, heart disease, and obesity, and by quitting smoking, limiting alcohol, and staying physically active.  Contact a health care provider if:  · You have personality changes.  · You have difficulty swallowing.  · You are seeing double.  · You have dizziness.  · You have a fever.  Get help right away if:  Any of the following symptoms may represent a serious problem that is an emergency. Do not wait to see if the symptoms will go away. Get medical help right away. Call your local emergency services (911 in [place] Do not drive yourself to the hospital.  · You have sudden weakness or numbness of the face, arm, or leg, especially on one side of the body.  · You have sudden trouble walking or difficulty moving arms or legs.  · You have sudden confusion.  · You have trouble speaking (aphasia) or understanding.  · You have sudden trouble seeing  in one or both eyes.  · You have a loss of balance or coordination.  · You have a sudden, severe headache with no known cause.  · You have new chest pain or an irregular heartbeat.  · You have a partial or total loss of consciousness.  This information is not intended to replace advice given to you by your health care provider. Make sure you discuss any questions you have with your health care provider.  Document Released: 09/27/2006 Document Revised: 08/21/2017 Document Reviewed: 03/25/2015  [employer] Interactive Patient Education © 2017 [employer]    Amlodipine tablets  What is this medicine?  AMLODIPINE (am VÍCTOR di peen) is a calcium-channel blocker. It affects the amount of calcium found in your heart and muscle cells. This relaxes your blood vessels, which can reduce the amount of work the heart has to do. This medicine is used to lower high blood pressure. It is also used to prevent chest pain.  This medicine may be used for other purposes; ask your health care provider or pharmacist if you have questions.  COMMON BRAND NAME(S): Norvasc  What should I tell my health care provider before I take this medicine?  They need to know if you have any of these conditions:  -heart problems like heart failure or aortic stenosis  -liver disease  -an unusual or allergic reaction to amlodipine, other medicines, foods, dyes, or preservatives  -pregnant or trying to get pregnant  -breast-feeding  How should I use this medicine?  Take this medicine by mouth with a glass of water. Follow the directions on the prescription label. Take your medicine at regular intervals. Do not take more medicine than directed.  Talk to your pediatrician regarding the use of this medicine in children. Special care may be needed. This medicine has been used in children as young as 6.  Persons over 65 years old may have a stronger reaction to this medicine and need smaller doses.  Overdosage: If you think you have taken too much of this medicine  contact a poison control center or emergency room at once.  NOTE: This medicine is only for you. Do not share this medicine with others.  What if I miss a dose?  If you miss a dose, take it as soon as you can. If it is almost time for your next dose, take only that dose. Do not take double or extra doses.  What may interact with this medicine?  -herbal or dietary supplements  -local or general anesthetics  -medicines for high blood pressure  -medicines for prostate problems  -rifampin  This list may not describe all possible interactions. Give your health care provider a list of all the medicines, herbs, non-prescription drugs, or dietary supplements you use. Also tell them if you smoke, drink alcohol, or use illegal drugs. Some items may interact with your medicine.  What should I watch for while using this medicine?  Visit your doctor or health care professional for regular check ups. Check your blood pressure and pulse rate regularly. Ask your health care professional what your blood pressure and pulse rate should be, and when you should contact him or her.  This medicine may make you feel confused, dizzy or lightheaded. Do not drive, use machinery, or do anything that needs mental alertness until you know how this medicine affects you. To reduce the risk of dizzy or fainting spells, do not sit or stand up quickly, especially if you are an older patient. Avoid alcoholic drinks; they can make you more dizzy.  Do not suddenly stop taking amlodipine. Ask your doctor or health care professional how you can gradually reduce the dose.  What side effects may I notice from receiving this medicine?  Side effects that you should report to your doctor or health care professional as soon as possible:  -allergic reactions like skin rash, itching or hives, swelling of the face, lips, or tongue  -breathing problems  -changes in vision or hearing  -chest pain  -fast, irregular heartbeat  -swelling of legs or ankles  Side effects  that usually do not require medical attention (report to your doctor or health care professional if they continue or are bothersome):  -dry mouth  -facial flushing  -nausea, vomiting  -stomach gas, pain  -tired, weak  -trouble sleeping  This list may not describe all possible side effects. Call your doctor for medical advice about side effects. You may report side effects to FDA at 5-479-KUQ-0700.  Where should I keep my medicine?  Keep out of the reach of children.  Store at room temperature between 59 and 86 degrees F (15 and 30 degrees C). Protect from light. Keep container tightly closed. Throw away any unused medicine after the expiration date.  NOTE: This sheet is a summary. It may not cover all possible information. If you have questions about this medicine, talk to your doctor, pharmacist, or health care provider.  © 2018 Elsevier/Gold Standard (2013-11-15 11:40:58)    Simvastatin tablets  What is this medicine?  SIMVASTATIN (SIM va stat in) is known as a HMG-CoA reductase inhibitor or 'statin'. It lowers the level of cholesterol and triglycerides in the blood. This drug may also reduce the risk of heart attack, stroke, or other health problems in patients with risk factors for heart or blood vessel disease. Diet and lifestyle changes are often used with this drug.  This medicine may be used for other purposes; ask your health care provider or pharmacist if you have questions.  COMMON BRAND NAME(S): Gloriar  What should I tell my health care provider before I take this medicine?  They need to know if you have any of these conditions:  -frequently drink alcoholic beverages  -kidney disease  -liver disease  -muscle aches or weakness  -other medical condition  -an unusual or allergic reaction to simvastatin, other medicines, foods, dyes, or preservatives  -pregnant or trying to get pregnant  -breast-feeding  How should I use this medicine?  Take this medicine by mouth with a glass of water. Follow the directions  on the prescription label. You can take this medicine with or without food. Take your doses at regular intervals. Do not take your medicine more often than directed.  Talk to your pediatrician regarding the use of this medicine in children. Special care may be needed.  Overdosage: If you think you have taken too much of this medicine contact a poison control center or emergency room at once.  NOTE: This medicine is only for you. Do not share this medicine with others.  What if I miss a dose?  If you miss a dose, take it as soon as you can. If it is almost time for your next dose, take only that dose. Do not take double or extra doses.  What may interact with this medicine?  Do not take this medicine with any of the following medications:  -boceprevir  -cyclosporine  -danazol  -gemfibrozil  -medicines for fungal infections like itraconazole, ketoconazole, posaconazole, voriconazole  -medicines for HIV infection  -mifepristone, RU-486  -nefazodone  -red yeast rice  -some antibiotics like clarithromycin, erythromycin, telithromycin  -telaprevir  This medicine may also interact with the following medications:  -alcohol  -amiodarone  -amlodipine  -colchicine  -digoxin  -diltiazem  -dronedarone  -fluconazole  -grapefruit juice  -niacin  -ranolazine  -verapamil  -warfarin  This list may not describe all possible interactions. Give your health care provider a list of all the medicines, herbs, non-prescription drugs, or dietary supplements you use. Also tell them if you smoke, drink alcohol, or use illegal drugs. Some items may interact with your medicine.  What should I watch for while using this medicine?  Visit your doctor or health care professional for regular check-ups. You may need regular tests to make sure your liver is working properly.  Tell your doctor or health care professional right away if you get any unexplained muscle pain, tenderness, or weakness, especially if you also have a fever and tiredness. Your  doctor or health care professional may tell you to stop taking this medicine if you develop muscle problems. If your muscle problems do not go away after stopping this medicine, contact your health care professional.  This medicine may affect blood sugar levels. If you have diabetes, check with your doctor or health care professional before you change your diet or the dose of your diabetic medicine.  This drug is only part of a total heart-health program. Your doctor or a dietician can suggest a low-cholesterol and low-fat diet to help. Avoid alcohol and smoking, and keep a proper exercise schedule.  Do not use this drug if you are pregnant or breast-feeding. Serious side effects to an unborn child or to an infant are possible. Talk to your doctor or pharmacist for more information.  If you are going to have surgery tell your health care professional that you are taking this drug.  Some drugs may increase the risk of side effects from simvastatin. If you are given certain antibiotics or antifungals, your doctor or health care professional may stop simvastatin for a short time. Check with your doctor or pharmacist for advice.  What side effects may I notice from receiving this medicine?  Side effects that you should report to your doctor or health care professional as soon as possible:  -allergic reactions like skin rash, itching or hives, swelling of the face, lips, or tongue  -confusion  -dark urine  -fever  -joint pain  -loss of memory  -muscle cramps, pain  -redness, blistering, peeling or loosening of the skin, including inside the mouth  -trouble passing urine or change in the amount of urine  -unusually weak or tired  -yellowing of the eyes or skin  Side effects that usually do not require medical attention (report to your doctor or health care professional if they continue or are bothersome):  -constipation  -heartburn  -stomach gas, pain, upset  This list may not describe all possible side effects. Call your  doctor for medical advice about side effects. You may report side effects to FDA at 1-350-MXK-0394.  Where should I keep my medicine?  Keep out of the reach of children.  Store at room temperature below 30 degrees C (86 degrees F). Keep container tightly closed. Throw away any unused medicine after the expiration date.  NOTE: This sheet is a summary. It may not cover all possible information. If you have questions about this medicine, talk to your doctor, pharmacist, or health care provider.  © 2018 Elsevier/Gold Standard (2012-11-06 10:40:36)        Hypertension  Hypertension, commonly called high blood pressure, is when the force of blood pumping through your arteries is too strong. Your arteries are the blood vessels that carry blood from your heart throughout your body. A blood pressure reading consists of a higher number over a lower number, such as 110/72. The higher number (systolic) is the pressure inside your arteries when your heart pumps. The lower number (diastolic) is the pressure inside your arteries when your heart relaxes. Ideally you want your blood pressure below 120/80.  Hypertension forces your heart to work harder to pump blood. Your arteries may become narrow or stiff. Having untreated or uncontrolled hypertension can cause heart attack, stroke, kidney disease, and other problems.  What increases the risk?  Some risk factors for high blood pressure are controllable. Others are not.  Risk factors you cannot control include:  · Race. You may be at higher risk if you are .  · Age. Risk increases with age.  · Gender. Men are at higher risk than women before age 45 years. After age 65, women are at higher risk than men.  Risk factors you can control include:  · Not getting enough exercise or physical activity.  · Being overweight.  · Getting too much fat, sugar, calories, or salt in your diet.  · Drinking too much alcohol.  What are the signs or symptoms?  Hypertension does not  usually cause signs or symptoms. Extremely high blood pressure (hypertensive crisis) may cause headache, anxiety, shortness of breath, and nosebleed.  How is this diagnosed?  To check if you have hypertension, your health care provider will measure your blood pressure while you are seated, with your arm held at the level of your heart. It should be measured at least twice using the same arm. Certain conditions can cause a difference in blood pressure between your right and left arms. A blood pressure reading that is higher than normal on one occasion does not mean that you need treatment. If it is not clear whether you have high blood pressure, you may be asked to return on a different day to have your blood pressure checked again. Or, you may be asked to monitor your blood pressure at home for 1 or more weeks.  How is this treated?  Treating high blood pressure includes making lifestyle changes and possibly taking medicine. Living a healthy lifestyle can help lower high blood pressure. You may need to change some of your habits.  Lifestyle changes may include:  · Following the DASH diet. This diet is high in fruits, vegetables, and whole grains. It is low in salt, red meat, and added sugars.  · Keep your sodium intake below 2,300 mg per day.  · Getting at least 30-45 minutes of aerobic exercise at least 4 times per week.  · Losing weight if necessary.  · Not smoking.  · Limiting alcoholic beverages.  · Learning ways to reduce stress.  Your health care provider may prescribe medicine if lifestyle changes are not enough to get your blood pressure under control, and if one of the following is true:  · You are 18-59 years of age and your systolic blood pressure is above 140.  · You are 60 years of age or older, and your systolic blood pressure is above 150.  · Your diastolic blood pressure is above 90.  · You have diabetes, and your systolic blood pressure is over 140 or your diastolic blood pressure is over 90.  · You  have kidney disease and your blood pressure is above 140/90.  · You have heart disease and your blood pressure is above 140/90.  Your personal target blood pressure may vary depending on your medical conditions, your age, and other factors.  Follow these instructions at home:  · Have your blood pressure rechecked as directed by your health care provider.  · Take medicines only as directed by your health care provider. Follow the directions carefully. Blood pressure medicines must be taken as prescribed. The medicine does not work as well when you skip doses. Skipping doses also puts you at risk for problems.  · Do not smoke.  · Monitor your blood pressure at home as directed by your health care provider.  Contact a health care provider if:  · You think you are having a reaction to medicines taken.  · You have recurrent headaches or feel dizzy.  · You have swelling in your ankles.  · You have trouble with your vision.  Get help right away if:  · You develop a severe headache or confusion.  · You have unusual weakness, numbness, or feel faint.  · You have severe chest or abdominal pain.  · You vomit repeatedly.  · You have trouble breathing.  This information is not intended to replace advice given to you by your health care provider. Make sure you discuss any questions you have with your health care provider.  Document Released: 12/18/2006 Document Revised: 05/25/2017 Document Reviewed: 10/10/2014  Elsevier Interactive Patient Education © 2017 Elsevier Inc.    Depression / Suicide Risk    As you are discharged from this [facility] facility, it is important to learn how to keep safe from harming yourself.    Recognize the warning signs:  · Abrupt changes in personality, positive or negative- including increase in energy   · Giving away possessions  · Change in eating patterns- significant weight changes-  positive or negative  · Change in sleeping patterns- unable to sleep or sleeping all the time   · Unwillingness  or inability to communicate  · Depression  · Unusual sadness, discouragement and loneliness  · Talk of wanting to die  · Neglect of personal appearance   · Rebelliousness- reckless behavior  · Withdrawal from people/activities they love  · Confusion- inability to concentrate     If you or a loved one observes any of these behaviors or has concerns about self-harm, here's what you can do:  · Talk about it- your feelings and reasons for harming yourself  · Remove any means that you might use to hurt yourself (examples: pills, rope, extension cords, firearm)  · Get professional help from the community (Mental Health, Substance Abuse, psychological counseling)  · Do not be alone:Call your Safe Contact- someone whom you trust who will be there for you.  · Call your local CRISIS HOTLINE 207-4979 or 159-553-2749  · Call your local Children's Mobile Crisis Response Team [place] (374) 588-0096 or www.Jaco Solarsi  · Call the toll free National Suicide Prevention Hotlines   · National Suicide Prevention Lifeline 883-120-AWPM (0349)  · National Hope Line Network 800-SUICIDE (267-6542)

## 2019-04-05 NOTE — PROGRESS NOTES
This RN attempted to make follow up appointment with [facility]. They do not accept [facility], per representative the outpatient therapy program out of pocket is $1200 a day. This RN attempted to call [facility], unable to leave message, gave patients family the outpatient number to call and setup appointment.

## 2019-04-05 NOTE — PROGRESS NOTES
Patient refusing bed alarm at this time, also requesting solid food. Patient educated that she is on full liquid diet, patient persistently requesting solid food despite gagging when eating. Patient educated on fall prevention and importance of calling when getting OOB, wearing non slip socks, using call bell. Patient verbalizes understanding, A+Ox4, and english speaking. Charge RN Tereza hodge

## 2019-04-05 NOTE — DISCHARGE PLANNING
Anticipated Discharge Disposition: D/C to Home    Action: Pt was previously on service with [facility]. Per attending MD, HH will not be ordered for resumption at this time. Anticipate pt to d/c home today. LSW provided [facility] information for [facility].     Barriers to Discharge: None.    Plan: No further d/c planning needs known at this time.

## 2019-04-05 NOTE — PROGRESS NOTES
Pt asking for tylenol and heat packs, refusing IV continuous fluids at this time, calling for assistance before exiting bed and ambulating to restroom.

## 2019-04-05 NOTE — PROGRESS NOTES
Pt sitting in bedside chair, asked RN to tidy up room, threw trash away, straightened sheets up for pt so it is ready for her when she is ready to get back into bed.  Brought pt 2 bottles of water.  Informed pt that a request for case management and a  to speak with her about her concerns was placed and will follow up with that tomorrow when they arrive.  Pt said she did not need anything additional at this time.

## 2019-04-05 NOTE — CARE PLAN
"Problem: Safety  Goal: Will remain free from falls    Intervention: Implement fall precautions  Pt has non slip socks on, refusing bed and chair alarms, reinforced importance on calling for help before ambulating to help prevent falls, pt verbalized understanding at this time.        Problem: Venous Thromboembolism (VTW)/Deep Vein Thrombosis (DVT) Prevention:  Goal: Patient will participate in Venous Thrombosis (VTE)/Deep Vein Thrombosis (DVT)Prevention Measures  Pt receiving lovenox, however refusing SCDs, reinforced importance of SCD to help prevent blood clots, pt said \"those annoy my legs\"      "

## 2019-04-08 ENCOUNTER — PATIENT OUTREACH (OUTPATIENT)
Dept: HEALTH INFORMATION MANAGEMENT | Facility: OTHER | Age: 79
End: 2019-04-08

## 2019-04-08 NOTE — PROGRESS NOTES
Outbound call to [facility] for post discharge medication review. Patient requested return call at 4:30 pm. Reviewed medications. No clinically significant interactions noted.     2nd attempt to reach [facility] for medication review. No answer/ busy signal.     Cassandra Wilhelm, PharmD

## 2019-04-09 ENCOUNTER — PATIENT OUTREACH (OUTPATIENT)
Dept: HEALTH INFORMATION MANAGEMENT | Facility: OTHER | Age: 79
End: 2019-04-09

## 2019-04-09 NOTE — DOCUMENTATION QUERY
"                                                                         [facility]                                                                         Query Response Note      PATIENT:               JOSE DEL CID  ACCT #:                  [id number]  MRN:                       [id number]  :                       1940  ADMIT DATE:       2019 12:21 PM  DISCH DATE:        2019 1:15 PM  RESPONDING  PROVIDER #:        [id number]           RESPONSE TEXT:    Acute and chronic pancreatitis    QUERY TEXT:    Acuity Specificity [id number]    Pancreatitis is documented in the Medical Record. It is unclear if this is a current condition and if so the acuity. Please clarify the status of this condition.    NOTE:  If the appropriate response is not listed below, please respond with a new note.    The patient's Clinical Indicators include:   ED Note: \"Acute pancreatitis\";   Lipase: 54   CT-Abdomen: \"Inflammatory change surrounding the pancreatic head and body may represent pancreatitis.\"   H&P: \"AP (abdominal pain) Due to lingering pancreatitis vs hypertensive emergency\"  4/3 Progress Note: \"Recent w/u for pancreatitis, lipase now wnl CT abdo showing inflamed pancreas, improving\"  Treatment: IV fluids; NPO on 4/3; pain management; zofran; lab testing; imaging  Risk Factors: Age; previous hospital admission for pancreatitis unclear etiology  Query created by: Giulai Trejo on 2019 8:53 AM        Electronically signed by:  ANSLEY ALMANZAR MD 2019 2:47 PM         "

## 2019-05-05 ENCOUNTER — PATIENT OUTREACH (OUTPATIENT)
Dept: HEALTH INFORMATION MANAGEMENT | Facility: OTHER | Age: 79
End: 2019-05-05

## 2019-05-09 ENCOUNTER — HOME HEALTH ADMISSION (OUTPATIENT)
Dept: HOME HEALTH SERVICES | Facility: HOME HEALTHCARE | Age: 79
End: 2019-05-09
Payer: MEDICARE

## 2019-05-13 NOTE — PROGRESS NOTES
Patient Luz Maria Saavedra was originally admitted to [facility] on 03/028/19 for small bowel obstruction and was discharged on 04/01/19. Patient was instructed to follow up with her Primary Care Physician  Philip Bryan  upon discharge., however,  patient readmitted back to [facility]  the next day, 04/02/19, for TIA and was discharged on 04/05/2019.  Patient was instructed to follow up with  her Primary Care Physician  Philip Bryan on 04/19 and  [facility] upon discharge.   Patient followed up with  Primary Care Physician  Philip Bryan on 04/19 and  [facility] Deon Jeffries on 05/01.  The patient did not follow up with [facility] and declined  [facility] assistance on scheduling appointment.   The patient has 1 future  Primary Care Physician follow up appointment scheduled with Philip Bryan on 07/11/19. Patient was discharge with a high lace score  , therefore, a PPS screening was conducted where the patient was scored above 50%.

## 2019-05-23 ENCOUNTER — HOME CARE VISIT (OUTPATIENT)
Dept: HOME HEALTH SERVICES | Facility: HOME HEALTHCARE | Age: 79
End: 2019-05-23

## 2019-07-16 ENCOUNTER — HOSPITAL ENCOUNTER (OUTPATIENT)
Dept: LAB | Facility: MEDICAL CENTER | Age: 79
End: 2019-07-16
Attending: FAMILY MEDICINE
Payer: MEDICARE

## 2019-07-16 LAB
ALBUMIN SERPL BCP-MCNC: 3.8 G/DL (ref 3.2–4.9)
ALBUMIN/GLOB SERPL: 1.1 G/DL
ALP SERPL-CCNC: 93 U/L (ref 30–99)
ALT SERPL-CCNC: 14 U/L (ref 2–50)
ANION GAP SERPL CALC-SCNC: 6 MMOL/L (ref 0–11.9)
AST SERPL-CCNC: 16 U/L (ref 12–45)
BILIRUB SERPL-MCNC: 0.4 MG/DL (ref 0.1–1.5)
BUN SERPL-MCNC: 15 MG/DL (ref 8–22)
CALCIUM SERPL-MCNC: 9.3 MG/DL (ref 8.5–10.5)
CHLORIDE SERPL-SCNC: 105 MMOL/L (ref 96–112)
CHOLEST SERPL-MCNC: 124 MG/DL (ref 100–199)
CO2 SERPL-SCNC: 27 MMOL/L (ref 20–33)
CREAT SERPL-MCNC: 0.71 MG/DL (ref 0.5–1.4)
FASTING STATUS PATIENT QL REPORTED: NORMAL
GLOBULIN SER CALC-MCNC: 3.5 G/DL (ref 1.9–3.5)
GLUCOSE SERPL-MCNC: 95 MG/DL (ref 65–99)
HDLC SERPL-MCNC: 52 MG/DL
LDLC SERPL CALC-MCNC: 53 MG/DL
POTASSIUM SERPL-SCNC: 4 MMOL/L (ref 3.6–5.5)
PROT SERPL-MCNC: 7.3 G/DL (ref 6–8.2)
SODIUM SERPL-SCNC: 138 MMOL/L (ref 135–145)
TRIGL SERPL-MCNC: 96 MG/DL (ref 0–149)
TSH SERPL DL<=0.005 MIU/L-ACNC: 1.99 UIU/ML (ref 0.38–5.33)

## 2019-07-16 PROCEDURE — 80061 LIPID PANEL: CPT

## 2019-07-16 PROCEDURE — 36415 COLL VENOUS BLD VENIPUNCTURE: CPT

## 2019-07-16 PROCEDURE — 84443 ASSAY THYROID STIM HORMONE: CPT

## 2019-07-16 PROCEDURE — 80053 COMPREHEN METABOLIC PANEL: CPT

## 2019-12-30 ENCOUNTER — HOSPITAL ENCOUNTER (OUTPATIENT)
Dept: LAB | Facility: MEDICAL CENTER | Age: 79
End: 2019-12-30
Attending: FAMILY MEDICINE
Payer: MEDICARE

## 2019-12-30 LAB
ALBUMIN SERPL BCP-MCNC: 3.8 G/DL (ref 3.2–4.9)
ALBUMIN/GLOB SERPL: 1 G/DL
ALP SERPL-CCNC: 99 U/L (ref 30–99)
ALT SERPL-CCNC: 11 U/L (ref 2–50)
ANION GAP SERPL CALC-SCNC: 10 MMOL/L (ref 0–11.9)
APPEARANCE UR: ABNORMAL
AST SERPL-CCNC: 20 U/L (ref 12–45)
BACTERIA #/AREA URNS HPF: ABNORMAL /HPF
BILIRUB SERPL-MCNC: 0.4 MG/DL (ref 0.1–1.5)
BILIRUB UR QL STRIP.AUTO: NEGATIVE
BUN SERPL-MCNC: 15 MG/DL (ref 8–22)
CALCIUM SERPL-MCNC: 9.1 MG/DL (ref 8.5–10.5)
CAOX CRY #/AREA URNS HPF: ABNORMAL /HPF
CHLORIDE SERPL-SCNC: 106 MMOL/L (ref 96–112)
CHOLEST SERPL-MCNC: 139 MG/DL (ref 100–199)
CO2 SERPL-SCNC: 26 MMOL/L (ref 20–33)
COLOR UR: YELLOW
CREAT SERPL-MCNC: 0.91 MG/DL (ref 0.5–1.4)
EPI CELLS #/AREA URNS HPF: ABNORMAL /HPF
FASTING STATUS PATIENT QL REPORTED: NORMAL
GLOBULIN SER CALC-MCNC: 3.8 G/DL (ref 1.9–3.5)
GLUCOSE SERPL-MCNC: 112 MG/DL (ref 65–99)
GLUCOSE UR STRIP.AUTO-MCNC: NEGATIVE MG/DL
HDLC SERPL-MCNC: 46 MG/DL
HYALINE CASTS #/AREA URNS LPF: ABNORMAL /LPF
KETONES UR STRIP.AUTO-MCNC: NEGATIVE MG/DL
LDLC SERPL CALC-MCNC: 64 MG/DL
LEUKOCYTE ESTERASE UR QL STRIP.AUTO: ABNORMAL
MICRO URNS: ABNORMAL
NITRITE UR QL STRIP.AUTO: NEGATIVE
PH UR STRIP.AUTO: 5.5 [PH] (ref 5–8)
POTASSIUM SERPL-SCNC: 4.2 MMOL/L (ref 3.6–5.5)
PROT SERPL-MCNC: 7.6 G/DL (ref 6–8.2)
PROT UR QL STRIP: 30 MG/DL
RBC # URNS HPF: ABNORMAL /HPF
RBC UR QL AUTO: ABNORMAL
SODIUM SERPL-SCNC: 142 MMOL/L (ref 135–145)
SP GR UR STRIP.AUTO: 1.02
TRIGL SERPL-MCNC: 143 MG/DL (ref 0–149)
UROBILINOGEN UR STRIP.AUTO-MCNC: 0.2 MG/DL
WBC #/AREA URNS HPF: ABNORMAL /HPF

## 2019-12-30 PROCEDURE — 87086 URINE CULTURE/COLONY COUNT: CPT

## 2019-12-30 PROCEDURE — 81001 URINALYSIS AUTO W/SCOPE: CPT

## 2019-12-30 PROCEDURE — 80053 COMPREHEN METABOLIC PANEL: CPT

## 2019-12-30 PROCEDURE — 36415 COLL VENOUS BLD VENIPUNCTURE: CPT

## 2019-12-30 PROCEDURE — 80061 LIPID PANEL: CPT

## 2020-01-02 LAB
BACTERIA UR CULT: NORMAL
SIGNIFICANT IND 70042: NORMAL
SITE SITE: NORMAL
SOURCE SOURCE: NORMAL

## 2020-09-01 ENCOUNTER — HOSPITAL ENCOUNTER (OUTPATIENT)
Dept: LAB | Facility: MEDICAL CENTER | Age: 80
End: 2020-09-01
Attending: OPHTHALMOLOGY
Payer: MEDICARE

## 2020-09-01 ENCOUNTER — HOSPITAL ENCOUNTER (OUTPATIENT)
Facility: MEDICAL CENTER | Age: 80
End: 2020-09-01
Attending: OPHTHALMOLOGY
Payer: MEDICARE

## 2020-09-01 LAB
BASOPHILS # BLD AUTO: 0.7 % (ref 0–1.8)
BASOPHILS # BLD: 0.07 K/UL (ref 0–0.12)
COVID ORDER STATUS COVID19: NORMAL
COVID ORDER STATUS COVID19: NORMAL
CRP SERPL HS-MCNC: 1.65 MG/DL (ref 0–0.75)
EOSINOPHIL # BLD AUTO: 0.19 K/UL (ref 0–0.51)
EOSINOPHIL NFR BLD: 1.8 % (ref 0–6.9)
ERYTHROCYTE [DISTWIDTH] IN BLOOD BY AUTOMATED COUNT: 45.1 FL (ref 35.9–50)
ERYTHROCYTE [SEDIMENTATION RATE] IN BLOOD BY WESTERGREN METHOD: 20 MM/HOUR (ref 0–30)
HCT VFR BLD AUTO: 42.7 % (ref 37–47)
HGB BLD-MCNC: 14.6 G/DL (ref 12–16)
IMM GRANULOCYTES # BLD AUTO: 0.02 K/UL (ref 0–0.11)
IMM GRANULOCYTES NFR BLD AUTO: 0.2 % (ref 0–0.9)
LYMPHOCYTES # BLD AUTO: 2.97 K/UL (ref 1–4.8)
LYMPHOCYTES NFR BLD: 27.6 % (ref 22–41)
MCH RBC QN AUTO: 31.1 PG (ref 27–33)
MCHC RBC AUTO-ENTMCNC: 34.2 G/DL (ref 33.6–35)
MCV RBC AUTO: 90.9 FL (ref 81.4–97.8)
MONOCYTES # BLD AUTO: 0.54 K/UL (ref 0–0.85)
MONOCYTES NFR BLD AUTO: 5 % (ref 0–13.4)
NEUTROPHILS # BLD AUTO: 6.97 K/UL (ref 2–7.15)
NEUTROPHILS NFR BLD: 64.7 % (ref 44–72)
NRBC # BLD AUTO: 0 K/UL
NRBC BLD-RTO: 0 /100 WBC
PLATELET # BLD AUTO: 273 K/UL (ref 164–446)
PMV BLD AUTO: 10.5 FL (ref 9–12.9)
RBC # BLD AUTO: 4.7 M/UL (ref 4.2–5.4)
RHEUMATOID FACT SER IA-ACNC: <10 IU/ML (ref 0–14)
WBC # BLD AUTO: 10.8 K/UL (ref 4.8–10.8)

## 2020-09-01 PROCEDURE — C9803 HOPD COVID-19 SPEC COLLECT: HCPCS

## 2020-09-01 PROCEDURE — U0003 INFECTIOUS AGENT DETECTION BY NUCLEIC ACID (DNA OR RNA); SEVERE ACUTE RESPIRATORY SYNDROME CORONAVIRUS 2 (SARS-COV-2) (CORONAVIRUS DISEASE [COVID-19]), AMPLIFIED PROBE TECHNIQUE, MAKING USE OF HIGH THROUGHPUT TECHNOLOGIES AS DESCRIBED BY CMS-2020-01-R: HCPCS

## 2020-09-01 PROCEDURE — 85025 COMPLETE CBC W/AUTO DIFF WBC: CPT

## 2020-09-01 PROCEDURE — 86431 RHEUMATOID FACTOR QUANT: CPT

## 2020-09-01 PROCEDURE — 36415 COLL VENOUS BLD VENIPUNCTURE: CPT

## 2020-09-01 PROCEDURE — C9803 HOPD COVID-19 SPEC COLLECT: HCPCS | Mod: 91

## 2020-09-01 PROCEDURE — 86038 ANTINUCLEAR ANTIBODIES: CPT

## 2020-09-01 PROCEDURE — 85652 RBC SED RATE AUTOMATED: CPT

## 2020-09-01 PROCEDURE — 86140 C-REACTIVE PROTEIN: CPT

## 2020-09-02 LAB
SARS-COV-2 RNA RESP QL NAA+PROBE: NOTDETECTED
SPECIMEN SOURCE: NORMAL

## 2020-09-03 LAB — NUCLEAR IGG SER QL IA: NORMAL

## 2020-11-06 ENCOUNTER — HOSPITAL ENCOUNTER (OUTPATIENT)
Dept: LAB | Facility: MEDICAL CENTER | Age: 80
End: 2020-11-06
Attending: NURSE PRACTITIONER
Payer: MEDICARE

## 2020-11-06 LAB
ALBUMIN SERPL BCP-MCNC: 3.4 G/DL (ref 3.2–4.9)
ALBUMIN/GLOB SERPL: 1.3 G/DL
ALP SERPL-CCNC: 96 U/L (ref 30–99)
ALT SERPL-CCNC: 29 U/L (ref 2–50)
ANION GAP SERPL CALC-SCNC: 12 MMOL/L (ref 7–16)
AST SERPL-CCNC: 20 U/L (ref 12–45)
BASOPHILS # BLD AUTO: 0.4 % (ref 0–1.8)
BASOPHILS # BLD: 0.05 K/UL (ref 0–0.12)
BILIRUB SERPL-MCNC: 0.3 MG/DL (ref 0.1–1.5)
BUN SERPL-MCNC: 21 MG/DL (ref 8–22)
CALCIUM SERPL-MCNC: 8.7 MG/DL (ref 8.5–10.5)
CHLORIDE SERPL-SCNC: 102 MMOL/L (ref 96–112)
CO2 SERPL-SCNC: 23 MMOL/L (ref 20–33)
CREAT SERPL-MCNC: 0.79 MG/DL (ref 0.5–1.4)
CRP SERPL HS-MCNC: 0.87 MG/DL (ref 0–0.75)
EOSINOPHIL # BLD AUTO: 0.06 K/UL (ref 0–0.51)
EOSINOPHIL NFR BLD: 0.5 % (ref 0–6.9)
ERYTHROCYTE [DISTWIDTH] IN BLOOD BY AUTOMATED COUNT: 53.1 FL (ref 35.9–50)
ERYTHROCYTE [SEDIMENTATION RATE] IN BLOOD BY WESTERGREN METHOD: 107 MM/HOUR (ref 0–30)
GLOBULIN SER CALC-MCNC: 2.6 G/DL (ref 1.9–3.5)
GLUCOSE SERPL-MCNC: 165 MG/DL (ref 65–99)
HCT VFR BLD AUTO: 39.6 % (ref 37–47)
HGB BLD-MCNC: 12.6 G/DL (ref 12–16)
IMM GRANULOCYTES # BLD AUTO: 0.1 K/UL (ref 0–0.11)
IMM GRANULOCYTES NFR BLD AUTO: 0.8 % (ref 0–0.9)
LYMPHOCYTES # BLD AUTO: 3.95 K/UL (ref 1–4.8)
LYMPHOCYTES NFR BLD: 31.5 % (ref 22–41)
MCH RBC QN AUTO: 28.8 PG (ref 27–33)
MCHC RBC AUTO-ENTMCNC: 31.8 G/DL (ref 33.6–35)
MCV RBC AUTO: 90.4 FL (ref 81.4–97.8)
MONOCYTES # BLD AUTO: 0.79 K/UL (ref 0–0.85)
MONOCYTES NFR BLD AUTO: 6.3 % (ref 0–13.4)
NEUTROPHILS # BLD AUTO: 7.6 K/UL (ref 2–7.15)
NEUTROPHILS NFR BLD: 60.5 % (ref 44–72)
NRBC # BLD AUTO: 0 K/UL
NRBC BLD-RTO: 0 /100 WBC
PLATELET # BLD AUTO: 264 K/UL (ref 164–446)
PMV BLD AUTO: 10.9 FL (ref 9–12.9)
POTASSIUM SERPL-SCNC: 4 MMOL/L (ref 3.6–5.5)
PROT SERPL-MCNC: 6 G/DL (ref 6–8.2)
RBC # BLD AUTO: 4.38 M/UL (ref 4.2–5.4)
SODIUM SERPL-SCNC: 137 MMOL/L (ref 135–145)
WBC # BLD AUTO: 12.6 K/UL (ref 4.8–10.8)

## 2020-11-06 PROCEDURE — 36415 COLL VENOUS BLD VENIPUNCTURE: CPT

## 2020-11-06 PROCEDURE — 85652 RBC SED RATE AUTOMATED: CPT

## 2020-11-06 PROCEDURE — 80053 COMPREHEN METABOLIC PANEL: CPT

## 2020-11-06 PROCEDURE — 86140 C-REACTIVE PROTEIN: CPT

## 2020-11-06 PROCEDURE — 85025 COMPLETE CBC W/AUTO DIFF WBC: CPT

## 2020-12-02 ENCOUNTER — HOSPITAL ENCOUNTER (OUTPATIENT)
Facility: MEDICAL CENTER | Age: 80
End: 2020-12-02
Attending: FAMILY MEDICINE
Payer: MEDICARE

## 2020-12-02 PROCEDURE — 87086 URINE CULTURE/COLONY COUNT: CPT

## 2020-12-05 LAB
BACTERIA UR CULT: NORMAL
SIGNIFICANT IND 70042: NORMAL
SITE SITE: NORMAL
SOURCE SOURCE: NORMAL

## 2020-12-29 ENCOUNTER — HOSPITAL ENCOUNTER (OUTPATIENT)
Dept: RADIOLOGY | Facility: MEDICAL CENTER | Age: 80
End: 2020-12-29
Attending: FAMILY MEDICINE
Payer: MEDICARE

## 2020-12-29 DIAGNOSIS — R31.29 HEMATURIA, MICROSCOPIC: ICD-10-CM

## 2020-12-29 PROCEDURE — 76700 US EXAM ABDOM COMPLETE: CPT

## 2021-01-11 DIAGNOSIS — Z23 NEED FOR VACCINATION: ICD-10-CM

## 2021-01-23 ENCOUNTER — IMMUNIZATION (OUTPATIENT)
Dept: FAMILY PLANNING/WOMEN'S HEALTH CLINIC | Facility: IMMUNIZATION CENTER | Age: 81
End: 2021-01-23
Attending: INTERNAL MEDICINE
Payer: MEDICARE

## 2021-01-23 DIAGNOSIS — Z23 NEED FOR VACCINATION: ICD-10-CM

## 2021-01-23 DIAGNOSIS — Z23 ENCOUNTER FOR VACCINATION: Primary | ICD-10-CM

## 2021-01-23 PROCEDURE — 0001A PFIZER SARS-COV-2 VACCINE: CPT | Performed by: INTERNAL MEDICINE

## 2021-01-23 PROCEDURE — 91300 PFIZER SARS-COV-2 VACCINE: CPT | Performed by: INTERNAL MEDICINE

## 2021-02-13 ENCOUNTER — IMMUNIZATION (OUTPATIENT)
Dept: FAMILY PLANNING/WOMEN'S HEALTH CLINIC | Facility: IMMUNIZATION CENTER | Age: 81
End: 2021-02-13
Payer: MEDICARE

## 2021-02-13 ENCOUNTER — APPOINTMENT (OUTPATIENT)
Dept: FAMILY PLANNING/WOMEN'S HEALTH CLINIC | Facility: IMMUNIZATION CENTER | Age: 81
End: 2021-02-13
Attending: INTERNAL MEDICINE
Payer: MEDICARE

## 2021-02-13 DIAGNOSIS — Z23 ENCOUNTER FOR VACCINATION: Primary | ICD-10-CM

## 2021-02-13 PROCEDURE — 91300 PFIZER SARS-COV-2 VACCINE: CPT

## 2021-02-13 PROCEDURE — 0002A PFIZER SARS-COV-2 VACCINE: CPT

## 2021-07-06 ENCOUNTER — PATIENT MESSAGE (OUTPATIENT)
Dept: HEALTH INFORMATION MANAGEMENT | Facility: OTHER | Age: 81
End: 2021-07-06

## 2021-10-01 ENCOUNTER — HOSPITAL ENCOUNTER (OUTPATIENT)
Dept: LAB | Facility: MEDICAL CENTER | Age: 81
End: 2021-10-01
Attending: FAMILY MEDICINE
Payer: MEDICARE

## 2021-10-01 ENCOUNTER — HOSPITAL ENCOUNTER (OUTPATIENT)
Dept: RADIOLOGY | Facility: MEDICAL CENTER | Age: 81
End: 2021-10-01
Attending: SPECIALIST
Payer: MEDICARE

## 2021-10-01 ENCOUNTER — HOSPITAL ENCOUNTER (OUTPATIENT)
Dept: LAB | Facility: MEDICAL CENTER | Age: 81
End: 2021-10-01
Attending: SPECIALIST
Payer: MEDICARE

## 2021-10-01 DIAGNOSIS — I77.6 VASCULITIS (HCC): ICD-10-CM

## 2021-10-01 LAB
25(OH)D3 SERPL-MCNC: 57 NG/ML (ref 30–100)
ALBUMIN SERPL BCP-MCNC: 4.3 G/DL (ref 3.2–4.9)
ALBUMIN SERPL BCP-MCNC: 4.3 G/DL (ref 3.2–4.9)
ALBUMIN/GLOB SERPL: 1.2 G/DL
ALBUMIN/GLOB SERPL: 1.3 G/DL
ALP SERPL-CCNC: 123 U/L (ref 30–99)
ALP SERPL-CCNC: 125 U/L (ref 30–99)
ALT SERPL-CCNC: 11 U/L (ref 2–50)
ALT SERPL-CCNC: 11 U/L (ref 2–50)
ANION GAP SERPL CALC-SCNC: 11 MMOL/L (ref 7–16)
ANION GAP SERPL CALC-SCNC: 12 MMOL/L (ref 7–16)
APPEARANCE UR: ABNORMAL
APPEARANCE UR: ABNORMAL
AST SERPL-CCNC: 18 U/L (ref 12–45)
AST SERPL-CCNC: 20 U/L (ref 12–45)
BACTERIA #/AREA URNS HPF: ABNORMAL /HPF
BACTERIA #/AREA URNS HPF: ABNORMAL /HPF
BASOPHILS # BLD AUTO: 0.9 % (ref 0–1.8)
BASOPHILS # BLD AUTO: 1 % (ref 0–1.8)
BASOPHILS # BLD: 0.1 K/UL (ref 0–0.12)
BASOPHILS # BLD: 0.11 K/UL (ref 0–0.12)
BILIRUB SERPL-MCNC: 0.4 MG/DL (ref 0.1–1.5)
BILIRUB SERPL-MCNC: 0.4 MG/DL (ref 0.1–1.5)
BILIRUB UR QL STRIP.AUTO: NEGATIVE
BILIRUB UR QL STRIP.AUTO: NEGATIVE
BUN SERPL-MCNC: 14 MG/DL (ref 8–22)
BUN SERPL-MCNC: 14 MG/DL (ref 8–22)
C3 SERPL-MCNC: 139 MG/DL (ref 87–200)
C4 SERPL-MCNC: 44.1 MG/DL (ref 19–52)
CALCIUM SERPL-MCNC: 9.8 MG/DL (ref 8.5–10.5)
CALCIUM SERPL-MCNC: 9.8 MG/DL (ref 8.5–10.5)
CHLORIDE SERPL-SCNC: 103 MMOL/L (ref 96–112)
CHLORIDE SERPL-SCNC: 103 MMOL/L (ref 96–112)
CHOLEST SERPL-MCNC: 139 MG/DL (ref 100–199)
CK SERPL-CCNC: 50 U/L (ref 0–154)
CO2 SERPL-SCNC: 23 MMOL/L (ref 20–33)
CO2 SERPL-SCNC: 24 MMOL/L (ref 20–33)
COLOR UR: YELLOW
COLOR UR: YELLOW
CREAT SERPL-MCNC: 0.68 MG/DL (ref 0.5–1.4)
CREAT SERPL-MCNC: 0.76 MG/DL (ref 0.5–1.4)
CREAT UR-MCNC: 222.34 MG/DL
CRP SERPL HS-MCNC: 0.8 MG/DL (ref 0–0.75)
EOSINOPHIL # BLD AUTO: 0.22 K/UL (ref 0–0.51)
EOSINOPHIL # BLD AUTO: 0.26 K/UL (ref 0–0.51)
EOSINOPHIL NFR BLD: 2.2 % (ref 0–6.9)
EOSINOPHIL NFR BLD: 2.2 % (ref 0–6.9)
EPI CELLS #/AREA URNS HPF: ABNORMAL /HPF
EPI CELLS #/AREA URNS HPF: ABNORMAL /HPF
ERYTHROCYTE [DISTWIDTH] IN BLOOD BY AUTOMATED COUNT: 43.3 FL (ref 35.9–50)
ERYTHROCYTE [DISTWIDTH] IN BLOOD BY AUTOMATED COUNT: 43.5 FL (ref 35.9–50)
ERYTHROCYTE [SEDIMENTATION RATE] IN BLOOD BY WESTERGREN METHOD: 63 MM/HOUR (ref 0–25)
EST. AVERAGE GLUCOSE BLD GHB EST-MCNC: 123 MG/DL
FASTING STATUS PATIENT QL REPORTED: NORMAL
GLOBULIN SER CALC-MCNC: 3.4 G/DL (ref 1.9–3.5)
GLOBULIN SER CALC-MCNC: 3.5 G/DL (ref 1.9–3.5)
GLUCOSE SERPL-MCNC: 105 MG/DL (ref 65–99)
GLUCOSE SERPL-MCNC: 107 MG/DL (ref 65–99)
GLUCOSE UR STRIP.AUTO-MCNC: NEGATIVE MG/DL
GLUCOSE UR STRIP.AUTO-MCNC: NEGATIVE MG/DL
HBA1C MFR BLD: 5.9 % (ref 4–5.6)
HBV CORE AB SERPL QL IA: NONREACTIVE
HBV SURFACE AB SERPL IA-ACNC: <3.5 MIU/ML (ref 0–10)
HBV SURFACE AG SER QL: NORMAL
HCT VFR BLD AUTO: 43.5 % (ref 37–47)
HCT VFR BLD AUTO: 43.7 % (ref 37–47)
HCV AB SER QL: NORMAL
HDLC SERPL-MCNC: 57 MG/DL
HGB BLD-MCNC: 13.9 G/DL (ref 12–16)
HGB BLD-MCNC: 14.1 G/DL (ref 12–16)
HYALINE CASTS #/AREA URNS LPF: ABNORMAL /LPF
HYALINE CASTS #/AREA URNS LPF: ABNORMAL /LPF
IMM GRANULOCYTES # BLD AUTO: 0.02 K/UL (ref 0–0.11)
IMM GRANULOCYTES # BLD AUTO: 0.04 K/UL (ref 0–0.11)
IMM GRANULOCYTES NFR BLD AUTO: 0.2 % (ref 0–0.9)
IMM GRANULOCYTES NFR BLD AUTO: 0.3 % (ref 0–0.9)
KETONES UR STRIP.AUTO-MCNC: NEGATIVE MG/DL
KETONES UR STRIP.AUTO-MCNC: NEGATIVE MG/DL
LDLC SERPL CALC-MCNC: 64 MG/DL
LEUKOCYTE ESTERASE UR QL STRIP.AUTO: ABNORMAL
LEUKOCYTE ESTERASE UR QL STRIP.AUTO: ABNORMAL
LYMPHOCYTES # BLD AUTO: 2.49 K/UL (ref 1–4.8)
LYMPHOCYTES # BLD AUTO: 3.02 K/UL (ref 1–4.8)
LYMPHOCYTES NFR BLD: 25.2 % (ref 22–41)
LYMPHOCYTES NFR BLD: 25.9 % (ref 22–41)
MCH RBC QN AUTO: 27.7 PG (ref 27–33)
MCH RBC QN AUTO: 28.1 PG (ref 27–33)
MCHC RBC AUTO-ENTMCNC: 32 G/DL (ref 33.6–35)
MCHC RBC AUTO-ENTMCNC: 32.3 G/DL (ref 33.6–35)
MCV RBC AUTO: 86.8 FL (ref 81.4–97.8)
MCV RBC AUTO: 87.2 FL (ref 81.4–97.8)
MICRO URNS: ABNORMAL
MICRO URNS: ABNORMAL
MONOCYTES # BLD AUTO: 0.51 K/UL (ref 0–0.85)
MONOCYTES # BLD AUTO: 0.7 K/UL (ref 0–0.85)
MONOCYTES NFR BLD AUTO: 5.2 % (ref 0–13.4)
MONOCYTES NFR BLD AUTO: 6 % (ref 0–13.4)
MORPHOLOGY BLD-IMP: NORMAL
MORPHOLOGY BLD-IMP: NORMAL
NEUTROPHILS # BLD AUTO: 6.56 K/UL (ref 2–7.15)
NEUTROPHILS # BLD AUTO: 7.51 K/UL (ref 2–7.15)
NEUTROPHILS NFR BLD: 64.7 % (ref 44–72)
NEUTROPHILS NFR BLD: 66.2 % (ref 44–72)
NITRITE UR QL STRIP.AUTO: NEGATIVE
NITRITE UR QL STRIP.AUTO: NEGATIVE
NRBC # BLD AUTO: 0.02 K/UL
NRBC # BLD AUTO: 0.07 K/UL
NRBC BLD-RTO: 0.2 /100 WBC
NRBC BLD-RTO: 0.7 /100 WBC
PH UR STRIP.AUTO: 5.5 [PH] (ref 5–8)
PH UR STRIP.AUTO: 5.5 [PH] (ref 5–8)
PLATELET # BLD AUTO: 212 K/UL (ref 164–446)
PLATELET # BLD AUTO: 279 K/UL (ref 164–446)
PMV BLD AUTO: 11.6 FL (ref 9–12.9)
PMV BLD AUTO: 11.7 FL (ref 9–12.9)
POTASSIUM SERPL-SCNC: 4.5 MMOL/L (ref 3.6–5.5)
POTASSIUM SERPL-SCNC: 4.6 MMOL/L (ref 3.6–5.5)
PROT SERPL-MCNC: 7.7 G/DL (ref 6–8.2)
PROT SERPL-MCNC: 7.8 G/DL (ref 6–8.2)
PROT UR QL STRIP: NEGATIVE MG/DL
PROT UR QL STRIP: NEGATIVE MG/DL
PROT UR-MCNC: 12 MG/DL (ref 0–15)
PROT/CREAT UR: 54 MG/G (ref 10–107)
RBC # BLD AUTO: 5.01 M/UL (ref 4.2–5.4)
RBC # BLD AUTO: 5.01 M/UL (ref 4.2–5.4)
RBC # URNS HPF: ABNORMAL /HPF
RBC # URNS HPF: ABNORMAL /HPF
RBC UR QL AUTO: ABNORMAL
RBC UR QL AUTO: ABNORMAL
RHEUMATOID FACT SER IA-ACNC: 10 IU/ML (ref 0–14)
SODIUM SERPL-SCNC: 138 MMOL/L (ref 135–145)
SODIUM SERPL-SCNC: 138 MMOL/L (ref 135–145)
SP GR UR STRIP.AUTO: 1.02
SP GR UR STRIP.AUTO: 1.02
TRIGL SERPL-MCNC: 92 MG/DL (ref 0–149)
TSH SERPL DL<=0.005 MIU/L-ACNC: 2.51 UIU/ML (ref 0.38–5.33)
TSH SERPL DL<=0.005 MIU/L-ACNC: 2.54 UIU/ML (ref 0.38–5.33)
UROBILINOGEN UR STRIP.AUTO-MCNC: 0.2 MG/DL
UROBILINOGEN UR STRIP.AUTO-MCNC: 0.2 MG/DL
WBC # BLD AUTO: 11.6 K/UL (ref 4.8–10.8)
WBC # BLD AUTO: 9.9 K/UL (ref 4.8–10.8)
WBC #/AREA URNS HPF: ABNORMAL /HPF
WBC #/AREA URNS HPF: ABNORMAL /HPF

## 2021-10-01 PROCEDURE — 86160 COMPLEMENT ANTIGEN: CPT

## 2021-10-01 PROCEDURE — 87340 HEPATITIS B SURFACE AG IA: CPT

## 2021-10-01 PROCEDURE — 85025 COMPLETE CBC W/AUTO DIFF WBC: CPT | Mod: 91

## 2021-10-01 PROCEDURE — 80053 COMPREHEN METABOLIC PANEL: CPT | Mod: 91

## 2021-10-01 PROCEDURE — 86225 DNA ANTIBODY NATIVE: CPT

## 2021-10-01 PROCEDURE — 86255 FLUORESCENT ANTIBODY SCREEN: CPT

## 2021-10-01 PROCEDURE — 82550 ASSAY OF CK (CPK): CPT

## 2021-10-01 PROCEDURE — 84443 ASSAY THYROID STIM HORMONE: CPT | Mod: 91

## 2021-10-01 PROCEDURE — 36415 COLL VENOUS BLD VENIPUNCTURE: CPT

## 2021-10-01 PROCEDURE — 86704 HEP B CORE ANTIBODY TOTAL: CPT

## 2021-10-01 PROCEDURE — 83036 HEMOGLOBIN GLYCOSYLATED A1C: CPT

## 2021-10-01 PROCEDURE — 86147 CARDIOLIPIN ANTIBODY EA IG: CPT

## 2021-10-01 PROCEDURE — 85025 COMPLETE CBC W/AUTO DIFF WBC: CPT

## 2021-10-01 PROCEDURE — 84156 ASSAY OF PROTEIN URINE: CPT

## 2021-10-01 PROCEDURE — 86803 HEPATITIS C AB TEST: CPT

## 2021-10-01 PROCEDURE — 81001 URINALYSIS AUTO W/SCOPE: CPT | Mod: 91

## 2021-10-01 PROCEDURE — 81001 URINALYSIS AUTO W/SCOPE: CPT

## 2021-10-01 PROCEDURE — 86235 NUCLEAR ANTIGEN ANTIBODY: CPT | Mod: 91

## 2021-10-01 PROCEDURE — 86140 C-REACTIVE PROTEIN: CPT

## 2021-10-01 PROCEDURE — 80061 LIPID PANEL: CPT

## 2021-10-01 PROCEDURE — 71046 X-RAY EXAM CHEST 2 VIEWS: CPT

## 2021-10-01 PROCEDURE — 82595 ASSAY OF CRYOGLOBULIN: CPT

## 2021-10-01 PROCEDURE — 86706 HEP B SURFACE ANTIBODY: CPT

## 2021-10-01 PROCEDURE — 86431 RHEUMATOID FACTOR QUANT: CPT

## 2021-10-01 PROCEDURE — 85652 RBC SED RATE AUTOMATED: CPT

## 2021-10-01 PROCEDURE — 83516 IMMUNOASSAY NONANTIBODY: CPT | Mod: 91

## 2021-10-01 PROCEDURE — 80053 COMPREHEN METABOLIC PANEL: CPT

## 2021-10-01 PROCEDURE — 82164 ANGIOTENSIN I ENZYME TEST: CPT

## 2021-10-01 PROCEDURE — 82570 ASSAY OF URINE CREATININE: CPT

## 2021-10-01 PROCEDURE — 87086 URINE CULTURE/COLONY COUNT: CPT

## 2021-10-01 PROCEDURE — 86038 ANTINUCLEAR ANTIBODIES: CPT

## 2021-10-01 PROCEDURE — 82306 VITAMIN D 25 HYDROXY: CPT

## 2021-10-01 PROCEDURE — 84443 ASSAY THYROID STIM HORMONE: CPT

## 2021-10-04 LAB
ACE SERPL-CCNC: 16 U/L (ref 9–67)
BACTERIA UR CULT: NORMAL
CARDIOLIPIN IGA SER IA-ACNC: <10 APL (ref 0–11)
CARDIOLIPIN IGG SER IA-ACNC: <10 GPL (ref 0–14)
CARDIOLIPIN IGM SER IA-ACNC: 22 MPL (ref 0–12)
NUCLEAR IGG SER QL IA: NORMAL
SIGNIFICANT IND 70042: NORMAL
SITE SITE: NORMAL
SOURCE SOURCE: NORMAL

## 2021-10-05 LAB — DSDNA AB TITR SER CLIF: 11 IU (ref 0–24)

## 2021-10-07 LAB
ANCA IFA PATTERN Q6048: NORMAL
ANCA IFA TITER Q6047: NORMAL
CRYOGLOB SER QL 3D COLD INC: NORMAL
ENA SM IGG SER-ACNC: 5 AU/ML (ref 0–40)
ENA SS-B IGG SER IA-ACNC: 0 AU/ML (ref 0–40)
MYELOPEROXIDASE AB SER-ACNC: 2 AU/ML (ref 0–19)
PROTEINASE3 AB SER-ACNC: 1 AU/ML (ref 0–19)
SSA52 R0ENA AB IGG Q0420: 23 AU/ML (ref 0–40)
SSA60 R0ENA AB IGG Q0419: 1 AU/ML (ref 0–40)
U1 SNRNP IGG SER QL: 2

## 2021-10-11 ENCOUNTER — HOSPITAL ENCOUNTER (OUTPATIENT)
Dept: LAB | Facility: MEDICAL CENTER | Age: 81
End: 2021-10-11
Attending: SPECIALIST
Payer: MEDICARE

## 2021-10-11 PROCEDURE — 86480 TB TEST CELL IMMUN MEASURE: CPT

## 2021-10-11 PROCEDURE — 36415 COLL VENOUS BLD VENIPUNCTURE: CPT

## 2021-10-12 LAB
GAMMA INTERFERON BACKGROUND BLD IA-ACNC: 0.12 IU/ML
M TB IFN-G BLD-IMP: POSITIVE
M TB IFN-G CD4+ BCKGRND COR BLD-ACNC: 1.89 IU/ML
MITOGEN IGNF BCKGRD COR BLD-ACNC: >10 IU/ML
QFT TB2 - NIL TBQ2: 3.62 IU/ML

## 2021-11-04 ENCOUNTER — TELEPHONE (OUTPATIENT)
Dept: HEALTH INFORMATION MANAGEMENT | Facility: OTHER | Age: 81
End: 2021-11-04

## 2021-11-04 NOTE — TELEPHONE ENCOUNTER
Outcome: Warm Transfer to [facility] to schedule in home Comprehensive Health Assessment     Please transfer to Patient Outreach Team at [phone] when patient returns call.    [facility] Verified: yes    Attempt # 2

## 2022-04-30 ENCOUNTER — HOSPITAL ENCOUNTER (OUTPATIENT)
Dept: LAB | Facility: MEDICAL CENTER | Age: 82
End: 2022-04-30
Attending: NURSE PRACTITIONER
Payer: MEDICARE

## 2022-04-30 LAB
25(OH)D3 SERPL-MCNC: 40 NG/ML (ref 30–100)
ALBUMIN SERPL BCP-MCNC: 4.1 G/DL (ref 3.2–4.9)
ALBUMIN/GLOB SERPL: 1.2 G/DL
ALP SERPL-CCNC: 134 U/L (ref 30–99)
ALT SERPL-CCNC: 9 U/L (ref 2–50)
ANION GAP SERPL CALC-SCNC: 10 MMOL/L (ref 7–16)
AST SERPL-CCNC: 9 U/L (ref 12–45)
BASOPHILS # BLD AUTO: 1.5 % (ref 0–1.8)
BASOPHILS # BLD: 0.12 K/UL (ref 0–0.12)
BILIRUB SERPL-MCNC: 0.6 MG/DL (ref 0.1–1.5)
BUN SERPL-MCNC: 13 MG/DL (ref 8–22)
CALCIUM SERPL-MCNC: 9.4 MG/DL (ref 8.5–10.5)
CHLORIDE SERPL-SCNC: 105 MMOL/L (ref 96–112)
CHOLEST SERPL-MCNC: 150 MG/DL (ref 100–199)
CO2 SERPL-SCNC: 24 MMOL/L (ref 20–33)
CREAT SERPL-MCNC: 0.72 MG/DL (ref 0.5–1.4)
CREAT UR-MCNC: 210.34 MG/DL
CRP SERPL HS-MCNC: 1.33 MG/DL (ref 0–0.75)
EOSINOPHIL # BLD AUTO: 0.32 K/UL (ref 0–0.51)
EOSINOPHIL NFR BLD: 4 % (ref 0–6.9)
ERYTHROCYTE [DISTWIDTH] IN BLOOD BY AUTOMATED COUNT: 45.2 FL (ref 35.9–50)
ERYTHROCYTE [SEDIMENTATION RATE] IN BLOOD BY WESTERGREN METHOD: 60 MM/HOUR (ref 0–25)
EST. AVERAGE GLUCOSE BLD GHB EST-MCNC: 131 MG/DL
FASTING STATUS PATIENT QL REPORTED: NORMAL
GFR SERPLBLD CREATININE-BSD FMLA CKD-EPI: 83 ML/MIN/1.73 M 2
GLOBULIN SER CALC-MCNC: 3.4 G/DL (ref 1.9–3.5)
GLUCOSE SERPL-MCNC: 123 MG/DL (ref 65–99)
HBA1C MFR BLD: 6.2 % (ref 4–5.6)
HCT VFR BLD AUTO: 43 % (ref 37–47)
HDLC SERPL-MCNC: 50 MG/DL
HGB BLD-MCNC: 13.5 G/DL (ref 12–16)
IMM GRANULOCYTES # BLD AUTO: 0.02 K/UL (ref 0–0.11)
IMM GRANULOCYTES NFR BLD AUTO: 0.2 % (ref 0–0.9)
LDLC SERPL CALC-MCNC: 81 MG/DL
LYMPHOCYTES # BLD AUTO: 2.08 K/UL (ref 1–4.8)
LYMPHOCYTES NFR BLD: 25.9 % (ref 22–41)
MCH RBC QN AUTO: 27.9 PG (ref 27–33)
MCHC RBC AUTO-ENTMCNC: 31.4 G/DL (ref 33.6–35)
MCV RBC AUTO: 88.8 FL (ref 81.4–97.8)
MICROALBUMIN UR-MCNC: <1.2 MG/DL
MICROALBUMIN/CREAT UR: NORMAL MG/G (ref 0–30)
MONOCYTES # BLD AUTO: 0.42 K/UL (ref 0–0.85)
MONOCYTES NFR BLD AUTO: 5.2 % (ref 0–13.4)
NEUTROPHILS # BLD AUTO: 5.06 K/UL (ref 2–7.15)
NEUTROPHILS NFR BLD: 63.2 % (ref 44–72)
NRBC # BLD AUTO: 0.03 K/UL
NRBC BLD-RTO: 0.4 /100 WBC
PLATELET # BLD AUTO: 197 K/UL (ref 164–446)
PMV BLD AUTO: 10.7 FL (ref 9–12.9)
POTASSIUM SERPL-SCNC: 4 MMOL/L (ref 3.6–5.5)
PROT SERPL-MCNC: 7.5 G/DL (ref 6–8.2)
RBC # BLD AUTO: 4.84 M/UL (ref 4.2–5.4)
SODIUM SERPL-SCNC: 139 MMOL/L (ref 135–145)
T3 SERPL-MCNC: 112 NG/DL (ref 60–181)
T4 FREE SERPL-MCNC: 1.28 NG/DL (ref 0.93–1.7)
TRIGL SERPL-MCNC: 93 MG/DL (ref 0–149)
TSH SERPL DL<=0.005 MIU/L-ACNC: 1.66 UIU/ML (ref 0.38–5.33)
WBC # BLD AUTO: 8 K/UL (ref 4.8–10.8)

## 2022-04-30 PROCEDURE — 80061 LIPID PANEL: CPT

## 2022-04-30 PROCEDURE — 82306 VITAMIN D 25 HYDROXY: CPT

## 2022-04-30 PROCEDURE — 82043 UR ALBUMIN QUANTITATIVE: CPT

## 2022-04-30 PROCEDURE — 85652 RBC SED RATE AUTOMATED: CPT

## 2022-04-30 PROCEDURE — 83036 HEMOGLOBIN GLYCOSYLATED A1C: CPT

## 2022-04-30 PROCEDURE — 86140 C-REACTIVE PROTEIN: CPT

## 2022-04-30 PROCEDURE — 85025 COMPLETE CBC W/AUTO DIFF WBC: CPT

## 2022-04-30 PROCEDURE — 84480 ASSAY TRIIODOTHYRONINE (T3): CPT

## 2022-04-30 PROCEDURE — 84439 ASSAY OF FREE THYROXINE: CPT

## 2022-04-30 PROCEDURE — 84443 ASSAY THYROID STIM HORMONE: CPT

## 2022-04-30 PROCEDURE — 87086 URINE CULTURE/COLONY COUNT: CPT

## 2022-04-30 PROCEDURE — 80053 COMPREHEN METABOLIC PANEL: CPT

## 2022-04-30 PROCEDURE — 36415 COLL VENOUS BLD VENIPUNCTURE: CPT

## 2022-04-30 PROCEDURE — 82570 ASSAY OF URINE CREATININE: CPT

## 2022-05-02 LAB
BACTERIA UR CULT: NORMAL
SIGNIFICANT IND 70042: NORMAL
SITE SITE: NORMAL
SOURCE SOURCE: NORMAL

## 2022-05-23 NOTE — ED TRIAGE NOTES
1217 - Pt arrives as a stroke protocol.  Per medics, pts family initially called 911 for abd pain, while medic was assessing pt at 1201, pt developed L sided facial droop, slurred speech, L sided weakness.  ERP, Neuro at the bedside assessing pt.  US IV established.  Pt wheeled to CT.  Pt arrives w/ = , L facial droop and mild slurred speech.  Pt c/o headache, n/v.  Pt is very hypertensive.  Pt also recently admitted to the hospital for pancreatitis, dc'd yesterday.  Pt to R8, report to Prabhakar POOLE.   
no

## 2022-06-17 ENCOUNTER — HOSPITAL ENCOUNTER (OUTPATIENT)
Dept: RADIOLOGY | Facility: MEDICAL CENTER | Age: 82
End: 2022-06-17
Attending: UROLOGY
Payer: MEDICARE

## 2022-06-17 ENCOUNTER — HOSPITAL ENCOUNTER (OUTPATIENT)
Dept: LAB | Facility: MEDICAL CENTER | Age: 82
End: 2022-06-17
Payer: MEDICARE

## 2022-06-17 DIAGNOSIS — H20.9 GLAUCOMATOCYCLITIC CRISIS, UNSPECIFIED GLAUCOMA STAGE, UNSPECIFIED LATERALITY: ICD-10-CM

## 2022-06-17 DIAGNOSIS — H40.40X0 GLAUCOMATOCYCLITIC CRISIS, UNSPECIFIED GLAUCOMA STAGE, UNSPECIFIED LATERALITY: ICD-10-CM

## 2022-06-17 LAB
ALBUMIN SERPL BCP-MCNC: 3.9 G/DL (ref 3.2–4.9)
ALBUMIN/GLOB SERPL: 1.1 G/DL
ALP SERPL-CCNC: 138 U/L (ref 30–99)
ALT SERPL-CCNC: 10 U/L (ref 2–50)
ANION GAP SERPL CALC-SCNC: 11 MMOL/L (ref 7–16)
APPEARANCE UR: ABNORMAL
AST SERPL-CCNC: 14 U/L (ref 12–45)
BACTERIA #/AREA URNS HPF: NEGATIVE /HPF
BILIRUB SERPL-MCNC: 0.3 MG/DL (ref 0.1–1.5)
BILIRUB UR QL STRIP.AUTO: NEGATIVE
BUN SERPL-MCNC: 12 MG/DL (ref 8–22)
CALCIUM SERPL-MCNC: 9.2 MG/DL (ref 8.5–10.5)
CHLORIDE SERPL-SCNC: 105 MMOL/L (ref 96–112)
CO2 SERPL-SCNC: 23 MMOL/L (ref 20–33)
COLOR UR: YELLOW
CREAT SERPL-MCNC: 0.62 MG/DL (ref 0.5–1.4)
CRP SERPL HS-MCNC: 1.07 MG/DL (ref 0–0.75)
EPI CELLS #/AREA URNS HPF: ABNORMAL /HPF
GFR SERPLBLD CREATININE-BSD FMLA CKD-EPI: 88 ML/MIN/1.73 M 2
GLOBULIN SER CALC-MCNC: 3.4 G/DL (ref 1.9–3.5)
GLUCOSE SERPL-MCNC: 139 MG/DL (ref 65–99)
GLUCOSE UR STRIP.AUTO-MCNC: NEGATIVE MG/DL
HYALINE CASTS #/AREA URNS LPF: ABNORMAL /LPF
KETONES UR STRIP.AUTO-MCNC: NEGATIVE MG/DL
LEUKOCYTE ESTERASE UR QL STRIP.AUTO: ABNORMAL
MICRO URNS: ABNORMAL
NITRITE UR QL STRIP.AUTO: NEGATIVE
PH UR STRIP.AUTO: 5 [PH] (ref 5–8)
POTASSIUM SERPL-SCNC: 4 MMOL/L (ref 3.6–5.5)
PROT SERPL-MCNC: 7.3 G/DL (ref 6–8.2)
PROT UR QL STRIP: NEGATIVE MG/DL
RBC # URNS HPF: ABNORMAL /HPF
RBC UR QL AUTO: ABNORMAL
SODIUM SERPL-SCNC: 139 MMOL/L (ref 135–145)
SP GR UR STRIP.AUTO: 1.02
T PALLIDUM AB SER QL IA: NORMAL
UROBILINOGEN UR STRIP.AUTO-MCNC: 0.2 MG/DL
WBC #/AREA URNS HPF: ABNORMAL /HPF

## 2022-06-17 PROCEDURE — 85549 MURAMIDASE: CPT

## 2022-06-17 PROCEDURE — 86780 TREPONEMA PALLIDUM: CPT

## 2022-06-17 PROCEDURE — 86480 TB TEST CELL IMMUN MEASURE: CPT

## 2022-06-17 PROCEDURE — 71046 X-RAY EXAM CHEST 2 VIEWS: CPT

## 2022-06-17 PROCEDURE — 82164 ANGIOTENSIN I ENZYME TEST: CPT

## 2022-06-17 PROCEDURE — 86140 C-REACTIVE PROTEIN: CPT

## 2022-06-17 PROCEDURE — 81001 URINALYSIS AUTO W/SCOPE: CPT

## 2022-06-17 PROCEDURE — 87529 HSV DNA AMP PROBE: CPT

## 2022-06-17 PROCEDURE — 80053 COMPREHEN METABOLIC PANEL: CPT

## 2022-06-17 PROCEDURE — 36415 COLL VENOUS BLD VENIPUNCTURE: CPT

## 2022-06-17 PROCEDURE — 86645 CMV ANTIBODY IGM: CPT

## 2022-06-17 PROCEDURE — 85025 COMPLETE CBC W/AUTO DIFF WBC: CPT

## 2022-06-17 PROCEDURE — 86787 VARICELLA-ZOSTER ANTIBODY: CPT | Mod: 91

## 2022-06-17 PROCEDURE — 86644 CMV ANTIBODY: CPT

## 2022-06-18 LAB
BASOPHILS # BLD AUTO: 1.2 % (ref 0–1.8)
BASOPHILS # BLD: 0.09 K/UL (ref 0–0.12)
EOSINOPHIL # BLD AUTO: 0.31 K/UL (ref 0–0.51)
EOSINOPHIL NFR BLD: 4.3 % (ref 0–6.9)
ERYTHROCYTE [DISTWIDTH] IN BLOOD BY AUTOMATED COUNT: 48.5 FL (ref 35.9–50)
ERYTHROCYTE [SEDIMENTATION RATE] IN BLOOD BY WESTERGREN METHOD: 11 MM/HOUR (ref 0–25)
HCT VFR BLD AUTO: 41.3 % (ref 37–47)
HGB BLD-MCNC: 13.2 G/DL (ref 12–16)
IMM GRANULOCYTES # BLD AUTO: 0.04 K/UL (ref 0–0.11)
IMM GRANULOCYTES NFR BLD AUTO: 0.6 % (ref 0–0.9)
LYMPHOCYTES # BLD AUTO: 1.59 K/UL (ref 1–4.8)
LYMPHOCYTES NFR BLD: 22 % (ref 22–41)
MCH RBC QN AUTO: 29.1 PG (ref 27–33)
MCHC RBC AUTO-ENTMCNC: 32 G/DL (ref 33.6–35)
MCV RBC AUTO: 91 FL (ref 81.4–97.8)
MONOCYTES # BLD AUTO: 0.39 K/UL (ref 0–0.85)
MONOCYTES NFR BLD AUTO: 5.4 % (ref 0–13.4)
NEUTROPHILS # BLD AUTO: 4.8 K/UL (ref 2–7.15)
NEUTROPHILS NFR BLD: 66.5 % (ref 44–72)
NRBC # BLD AUTO: 0 K/UL
NRBC BLD-RTO: 0 /100 WBC
PLATELET # BLD AUTO: 324 K/UL (ref 164–446)
PMV BLD AUTO: 12.1 FL (ref 9–12.9)
RBC # BLD AUTO: 4.54 M/UL (ref 4.2–5.4)
WBC # BLD AUTO: 7.2 K/UL (ref 4.8–10.8)

## 2022-06-19 LAB
ACE SERPL-CCNC: <10 U/L (ref 16–85)
GAMMA INTERFERON BACKGROUND BLD IA-ACNC: 0.2 IU/ML
M TB IFN-G BLD-IMP: POSITIVE
M TB IFN-G CD4+ BCKGRND COR BLD-ACNC: 6.27 IU/ML
MITOGEN IGNF BCKGRD COR BLD-ACNC: >10 IU/ML
QFT TB2 - NIL TBQ2: 6.22 IU/ML

## 2022-06-20 LAB
CMV IGG SERPL IA-ACNC: >10 U/ML
CMV IGM SERPL IA-ACNC: <8 AU/ML
VZV IGG SER IA-ACNC: 2656 IV
VZV IGM SER IA-ACNC: 0.12 ISR

## 2022-06-21 LAB — LYSOZYME SERPL-MCNC: 1.84 UG/ML

## 2022-06-22 LAB
HSV DNA SPEC QL NAA+PROBE: NOT DETECTED
SPECIMEN SOURCE: NORMAL

## 2022-06-28 ENCOUNTER — TELEPHONE (OUTPATIENT)
Dept: HEALTH INFORMATION MANAGEMENT | Facility: OTHER | Age: 82
End: 2022-06-28
Payer: MEDICARE

## 2022-08-18 ENCOUNTER — APPOINTMENT (OUTPATIENT)
Dept: RADIOLOGY | Facility: MEDICAL CENTER | Age: 82
End: 2022-08-18
Attending: EMERGENCY MEDICINE
Payer: MEDICARE

## 2022-08-18 ENCOUNTER — HOSPITAL ENCOUNTER (EMERGENCY)
Facility: MEDICAL CENTER | Age: 82
End: 2022-08-18
Attending: EMERGENCY MEDICINE
Payer: MEDICARE

## 2022-08-18 VITALS
OXYGEN SATURATION: 95 % | RESPIRATION RATE: 14 BRPM | HEIGHT: 63 IN | HEART RATE: 67 BPM | SYSTOLIC BLOOD PRESSURE: 193 MMHG | DIASTOLIC BLOOD PRESSURE: 79 MMHG | WEIGHT: 183.42 LBS | BODY MASS INDEX: 32.5 KG/M2 | TEMPERATURE: 97.8 F

## 2022-08-18 DIAGNOSIS — R10.84 GENERALIZED ABDOMINAL PAIN: ICD-10-CM

## 2022-08-18 LAB
ALBUMIN SERPL BCP-MCNC: 4 G/DL (ref 3.2–4.9)
ALBUMIN/GLOB SERPL: 1.2 G/DL
ALP SERPL-CCNC: 116 U/L (ref 30–99)
ALT SERPL-CCNC: 10 U/L (ref 2–50)
ANION GAP SERPL CALC-SCNC: 11 MMOL/L (ref 7–16)
APPEARANCE UR: CLEAR
AST SERPL-CCNC: 15 U/L (ref 12–45)
BACTERIA #/AREA URNS HPF: NEGATIVE /HPF
BASOPHILS # BLD AUTO: 0.9 % (ref 0–1.8)
BASOPHILS # BLD: 0.07 K/UL (ref 0–0.12)
BILIRUB SERPL-MCNC: 0.4 MG/DL (ref 0.1–1.5)
BILIRUB UR QL STRIP.AUTO: NEGATIVE
BUN SERPL-MCNC: 10 MG/DL (ref 8–22)
CALCIUM SERPL-MCNC: 9.1 MG/DL (ref 8.5–10.5)
CHLORIDE SERPL-SCNC: 106 MMOL/L (ref 96–112)
CO2 SERPL-SCNC: 23 MMOL/L (ref 20–33)
COLOR UR: YELLOW
CREAT SERPL-MCNC: 0.65 MG/DL (ref 0.5–1.4)
EOSINOPHIL # BLD AUTO: 0.15 K/UL (ref 0–0.51)
EOSINOPHIL NFR BLD: 1.9 % (ref 0–6.9)
EPI CELLS #/AREA URNS HPF: NEGATIVE /HPF
ERYTHROCYTE [DISTWIDTH] IN BLOOD BY AUTOMATED COUNT: 51.9 FL (ref 35.9–50)
GFR SERPLBLD CREATININE-BSD FMLA CKD-EPI: 87 ML/MIN/1.73 M 2
GLOBULIN SER CALC-MCNC: 3.3 G/DL (ref 1.9–3.5)
GLUCOSE SERPL-MCNC: 116 MG/DL (ref 65–99)
GLUCOSE UR STRIP.AUTO-MCNC: NEGATIVE MG/DL
HCT VFR BLD AUTO: 40.7 % (ref 37–47)
HGB BLD-MCNC: 13.9 G/DL (ref 12–16)
HYALINE CASTS #/AREA URNS LPF: NORMAL /LPF
IMM GRANULOCYTES # BLD AUTO: 0.01 K/UL (ref 0–0.11)
IMM GRANULOCYTES NFR BLD AUTO: 0.1 % (ref 0–0.9)
KETONES UR STRIP.AUTO-MCNC: NEGATIVE MG/DL
LEUKOCYTE ESTERASE UR QL STRIP.AUTO: NEGATIVE
LIPASE SERPL-CCNC: 16 U/L (ref 11–82)
LYMPHOCYTES # BLD AUTO: 2.2 K/UL (ref 1–4.8)
LYMPHOCYTES NFR BLD: 28.5 % (ref 22–41)
MCH RBC QN AUTO: 31 PG (ref 27–33)
MCHC RBC AUTO-ENTMCNC: 34.2 G/DL (ref 33.6–35)
MCV RBC AUTO: 90.6 FL (ref 81.4–97.8)
MICRO URNS: ABNORMAL
MONOCYTES # BLD AUTO: 0.39 K/UL (ref 0–0.85)
MONOCYTES NFR BLD AUTO: 5.1 % (ref 0–13.4)
NEUTROPHILS # BLD AUTO: 4.9 K/UL (ref 2–7.15)
NEUTROPHILS NFR BLD: 63.5 % (ref 44–72)
NITRITE UR QL STRIP.AUTO: NEGATIVE
NRBC # BLD AUTO: 0 K/UL
NRBC BLD-RTO: 0 /100 WBC
PH UR STRIP.AUTO: 5.5 [PH] (ref 5–8)
PLATELET # BLD AUTO: 305 K/UL (ref 164–446)
PMV BLD AUTO: 10.2 FL (ref 9–12.9)
POTASSIUM SERPL-SCNC: 3.9 MMOL/L (ref 3.6–5.5)
PROT SERPL-MCNC: 7.3 G/DL (ref 6–8.2)
PROT UR QL STRIP: NEGATIVE MG/DL
RBC # BLD AUTO: 4.49 M/UL (ref 4.2–5.4)
RBC # URNS HPF: NORMAL /HPF
RBC UR QL AUTO: ABNORMAL
SODIUM SERPL-SCNC: 140 MMOL/L (ref 135–145)
SP GR UR STRIP.AUTO: 1.01
UROBILINOGEN UR STRIP.AUTO-MCNC: 0.2 MG/DL
WBC # BLD AUTO: 7.7 K/UL (ref 4.8–10.8)
WBC #/AREA URNS HPF: NORMAL /HPF

## 2022-08-18 PROCEDURE — 36415 COLL VENOUS BLD VENIPUNCTURE: CPT

## 2022-08-18 PROCEDURE — 83690 ASSAY OF LIPASE: CPT

## 2022-08-18 PROCEDURE — 74177 CT ABD & PELVIS W/CONTRAST: CPT | Mod: ME

## 2022-08-18 PROCEDURE — A9270 NON-COVERED ITEM OR SERVICE: HCPCS | Performed by: EMERGENCY MEDICINE

## 2022-08-18 PROCEDURE — 85025 COMPLETE CBC W/AUTO DIFF WBC: CPT

## 2022-08-18 PROCEDURE — 700117 HCHG RX CONTRAST REV CODE 255: Performed by: EMERGENCY MEDICINE

## 2022-08-18 PROCEDURE — 99285 EMERGENCY DEPT VISIT HI MDM: CPT

## 2022-08-18 PROCEDURE — 81001 URINALYSIS AUTO W/SCOPE: CPT

## 2022-08-18 PROCEDURE — 80053 COMPREHEN METABOLIC PANEL: CPT

## 2022-08-18 PROCEDURE — 700102 HCHG RX REV CODE 250 W/ 637 OVERRIDE(OP): Performed by: EMERGENCY MEDICINE

## 2022-08-18 RX ORDER — AMLODIPINE BESYLATE 5 MG/1
5 TABLET ORAL ONCE
Status: COMPLETED | OUTPATIENT
Start: 2022-08-18 | End: 2022-08-18

## 2022-08-18 RX ADMIN — IOHEXOL 100 ML: 350 INJECTION, SOLUTION INTRAVENOUS at 17:58

## 2022-08-18 RX ADMIN — AMLODIPINE BESYLATE 5 MG: 5 TABLET ORAL at 18:12

## 2022-08-18 ASSESSMENT — FIBROSIS 4 INDEX: FIB4 SCORE: 1.13

## 2022-08-18 NOTE — ED TRIAGE NOTES
Chief Complaint   Patient presents with    Abdominal Pain    Hypertension     Pt. Arrives via [facility] d/t abd pain for the past month. States intermittent n/v/d. Denies urinary frequency or urgency.     EMS was called when pt. Was found to be hypertensive (systolics near 200).

## 2022-08-18 NOTE — ED PROVIDER NOTES
"ED Provider Note    Scribed for Armen Rodriguez M.D. by Valdo Rey. 8/18/2022,  2:49 PM.    CHIEF COMPLAINT  Chief Complaint   Patient presents with    Abdominal Pain    Hypertension       [facility]  Luz Maria Saavedra is a 81 y.o. female who presents to the Emergency Department for evaluation for central abdominal pain, onset 3 weeks ago. She states that he pain is intermittent and radiates around to her back. Her pain is similar to previous pain she had when she was diagnosed with kidney failure. She does not currently receive dialysis. She has associated symptoms of pain in her legs, nausea, vomiting, and diarrhea. She states that most of her symptoms are intermittent. She denies any fever or chills. She denies any recent trauma or injuries. She also emphasizes that in 2020 she was taking prednisolone daily and had several adverse side effects from this, \"I had over 35 side effects\", so she is no longer taking it. However, she does not explain how this is contributing to her current symptoms. She has history of hypertension. There are no known alleviating or exacerbating factors.       History Limited By: Patient is a very difficult historian.    REVIEW OF SYSTEMS  See HPI for further details.       PAST MEDICAL HISTORY   has a past medical history of Anxiety and depression, Hypertension, Renal disorder, and Stroke (HCC).    SOCIAL HISTORY  Social History     Tobacco Use    Smoking status: Never    Smokeless tobacco: Never   Substance and Sexual Activity    Alcohol use: No    Drug use: Yes     Social History     Substance and Sexual Activity   Drug Use Yes       SURGICAL HISTORY   has a past surgical history that includes pham by laparoscopy; neuroma excision (11/21/08); cataract phaco with iol (1/27/2009); cataract phaco with iol (2/10/2009); cholecystectomy; exploratory laparotomy (8/4/2012); and hiatal hernia repair.    CURRENT MEDICATIONS  Current Outpatient Medications   Medication Instructions    amLODIPine " "(NORVASC) 5 mg, Oral, DAILY    aspirin EC (ECOTRIN) 81 mg, Oral, 2 TIMES DAILY    busPIRone (BUSPAR) 15 mg, Oral, 3 TIMES DAILY PRN    simvastatin (ZOCOR) 20 mg, Oral, EVERY EVENING    tamsulosin (FLOMAX) 0.4 mg, Oral, AFTER BREAKFAST    vitamin D2 (Ergocalciferol) (DRISDOL) 50,000 Units, Oral, EVERY SAT, saturday           ALLERGIES  Allergies   Allergen Reactions    Amitriptyline     Ciprofloxacin     Codeine     Hydralazine      Face swelling    Other Drug      Pt states she is allergic to many antibiotics but she does not remember what they are.    Pcn [Penicillins]     Potassium Shortness of Breath     \"I feel like I can't breath\".  Has tolerate IV KCl in past    Xanax [Alprazolam]     Zoloft     Zyrtec [Cetirizine]        PHYSICAL EXAM  VITAL SIGNS: BP (!) 203/91   Pulse 74   Temp 37.1 °C (98.8 °F) (Temporal)   Resp 16   Ht 1.6 m (5' 3\")   Wt 83.2 kg (183 lb 6.8 oz)   SpO2 96%   BMI 32.49 kg/m²   Pulse ox interpretation: I interpret this pulse ox as normal.  Constitutional: Alert in no apparent distress.  HENT: No signs of trauma, Bilateral external ears normal, Nose normal.   Eyes: Conjunctiva normal, Non-icteric.   Neck: Normal range of motion, Supple, No stridor.   Lymphatic: No lymphadenopathy noted.   Cardiovascular: Regular rate and rhythm, no murmurs.   Thorax & Lungs: Normal breath sounds, No respiratory distress, No wheezing, No chest tenderness.   Abdomen: Bowel sounds normal, Soft, No tenderness, No masses, No pulsatile masses. No peritoneal signs.  Skin: Warm, Dry, No erythema, No rash.   Back: No midline bony tenderness.   Extremities: Intact distal pulses, No edema, No cyanosis.  Musculoskeletal: Good range of motion in all major joints. No or major deformities noted.   Neurologic: Alert , Normal motor function, Normal sensory function, No focal deficits noted.   Psychiatric: Affect normal, Judgment normal, Mood normal.     DIAGNOSTIC STUDIES / PROCEDURES    LABS  Labs Reviewed   CBC WITH " DIFFERENTIAL - Abnormal; Notable for the following components:       Result Value    RDW 51.9 (*)     All other components within normal limits   COMP METABOLIC PANEL - Abnormal; Notable for the following components:    Glucose 116 (*)     Alkaline Phosphatase 116 (*)     All other components within normal limits   URINALYSIS - Abnormal; Notable for the following components:    Occult Blood Trace (*)     All other components within normal limits   LIPASE   ESTIMATED GFR   URINE MICROSCOPIC (W/UA)     All labs reviewed by me.    RADIOLOGY  CT-ABDOMEN-PELVIS WITH   Final Result      1.  No acute intra-abdominal findings.      2.  Diverticulosis.      3.  Postoperative changes the gastroesophageal junction      4.  Innumerable scattered tiny nodules within the lung bases, likely infectious or inflammatory. Scattered nodules have been present since prior exams dating back to 2014 suggesting a benign process.        The radiologist's interpretation of all radiological studies have been reviewed by me.    COURSE & MEDICAL DECISION MAKING  Nursing notes, VSLINDSAYHx reviewed in chart.     2:49 PM Patient seen and examined at bedside. Differential diagnosis includes but is not limited to Abdominal pain, Kidney stones, Kidney failure, Dehydration, Obstruction. Ordered for CT-abdomen-pelvis with, Lipase, Urinalysis, CBC with differential, and CMP to evaluate. Patient will be treated with fentanyl injection 25 mg for her symptoms.     6:10 PM Patient will be treated with norvasc tablet 5 mg.     6:43 PM - I reevaluated the patient at bedside.  As above, the precise nature of her complaint is difficult to ascertain, but seems mostly related to periumbilical pain, but this history is clouded by a number of other concerns dating back to 2020.  She had a thorough evaluation, and prolonged observation period, with no evidence of toxicity or infection and a normal abdominal CT.  The exact cause of her symptoms is not clear, but there  is no evidence of a dangerous cause.  I discussed the patient's diagnostic study results which were reassuring. I instructed the patient to ensure she is taking her medication for hypertension. I discussed plan for discharge and follow up as outlined below. The patient is stable for discharge at this time and will return for any new or worsening symptoms. Patient verbalizes understanding and support with my plan for discharge.       The patient will return for new or worsening symptoms and is stable at the time of discharge.      DISPOSITION:  Patient will be discharged home in stable condition.    FOLLOW UP:  William Coleman M.D.  [facility]   [address] [address]  [phone]    Schedule an appointment as soon as possible for a visit         FINAL IMPRESSION  1. Generalized abdominal pain         Valdo SIDHU (Scribaustin), am scribing for, and in the presence of, Armen Rdoriguez M.D..    Electronically signed by: Valdo Rey (Mary[place]in), 8/18/2022    IArmen M.D. personally performed the services described in this documentation, as scribed by Valdo Rey in my presence, and it is both accurate and complete.    The note accurately reflects work and decisions made by me.  Armen Rodriguez M.D.  8/18/2022  7:26 PM

## 2022-08-19 NOTE — DISCHARGE INSTRUCTIONS
All of your tests here were very reassuring.  There is no sign of infection or any dangerous cause of your symptoms.  Please make sure you are taking your blood pressure medication, and any other home medications you are supposed to have.  Please call tomorrow to schedule a follow-up visit with your primary care provider.

## 2022-08-19 NOTE — ED NOTES
Pt discharged instable condition and ambulates with a steady gait to lobby. Pt's son now able to come pick her up so no cab voucher needed at this time. IV removed and pt educated to continue taking her home medications as prescribed, and to follow up with her PCP [facility]. No questions at time of d/c

## 2022-08-19 NOTE — ED NOTES
Spoke with pts. Son on the phone per pts. Request. Denies further needs at this time. Awaiting CT results.

## 2022-10-11 ENCOUNTER — HOME HEALTH ADMISSION (OUTPATIENT)
Dept: HOME HEALTH SERVICES | Facility: HOME HEALTHCARE | Age: 82
End: 2022-10-11
Payer: MEDICARE

## 2022-10-12 ENCOUNTER — HOME CARE VISIT (OUTPATIENT)
Dept: HOME HEALTH SERVICES | Facility: HOME HEALTHCARE | Age: 82
End: 2022-10-12

## 2022-11-21 ENCOUNTER — HOSPITAL ENCOUNTER (OUTPATIENT)
Dept: LAB | Facility: MEDICAL CENTER | Age: 82
End: 2022-11-21
Attending: ANESTHESIOLOGY
Payer: MEDICARE

## 2022-11-21 LAB
ANION GAP SERPL CALC-SCNC: 12 MMOL/L (ref 7–16)
BASOPHILS # BLD AUTO: 2.1 % (ref 0–1.8)
BASOPHILS # BLD: 0.21 K/UL (ref 0–0.12)
BUN SERPL-MCNC: 22 MG/DL (ref 8–22)
CALCIUM SERPL-MCNC: 9.3 MG/DL (ref 8.5–10.5)
CHLORIDE SERPL-SCNC: 102 MMOL/L (ref 96–112)
CO2 SERPL-SCNC: 23 MMOL/L (ref 20–33)
CREAT SERPL-MCNC: 0.86 MG/DL (ref 0.5–1.4)
EOSINOPHIL # BLD AUTO: 0.34 K/UL (ref 0–0.51)
EOSINOPHIL NFR BLD: 3.4 % (ref 0–6.9)
ERYTHROCYTE [DISTWIDTH] IN BLOOD BY AUTOMATED COUNT: 47.1 FL (ref 35.9–50)
GFR SERPLBLD CREATININE-BSD FMLA CKD-EPI: 67 ML/MIN/1.73 M 2
GLUCOSE SERPL-MCNC: 144 MG/DL (ref 65–99)
HCT VFR BLD AUTO: 38.3 % (ref 37–47)
HGB BLD-MCNC: 12.4 G/DL (ref 12–16)
IMM GRANULOCYTES # BLD AUTO: 0.03 K/UL (ref 0–0.11)
IMM GRANULOCYTES NFR BLD AUTO: 0.3 % (ref 0–0.9)
LYMPHOCYTES # BLD AUTO: 2.27 K/UL (ref 1–4.8)
LYMPHOCYTES NFR BLD: 22.7 % (ref 22–41)
MCH RBC QN AUTO: 29.2 PG (ref 27–33)
MCHC RBC AUTO-ENTMCNC: 32.4 G/DL (ref 33.6–35)
MCV RBC AUTO: 90.1 FL (ref 81.4–97.8)
MONOCYTES # BLD AUTO: 0.58 K/UL (ref 0–0.85)
MONOCYTES NFR BLD AUTO: 5.8 % (ref 0–13.4)
NEUTROPHILS # BLD AUTO: 6.58 K/UL (ref 2–7.15)
NEUTROPHILS NFR BLD: 65.7 % (ref 44–72)
NRBC # BLD AUTO: 0.07 K/UL
NRBC BLD-RTO: 0.7 /100 WBC
PLATELET # BLD AUTO: 254 K/UL (ref 164–446)
PMV BLD AUTO: 11.8 FL (ref 9–12.9)
POTASSIUM SERPL-SCNC: 3.6 MMOL/L (ref 3.6–5.5)
RBC # BLD AUTO: 4.25 M/UL (ref 4.2–5.4)
SODIUM SERPL-SCNC: 137 MMOL/L (ref 135–145)
WBC # BLD AUTO: 10 K/UL (ref 4.8–10.8)

## 2022-11-21 PROCEDURE — 80048 BASIC METABOLIC PNL TOTAL CA: CPT

## 2022-11-21 PROCEDURE — 85025 COMPLETE CBC W/AUTO DIFF WBC: CPT

## 2022-11-21 PROCEDURE — 36415 COLL VENOUS BLD VENIPUNCTURE: CPT

## 2022-12-22 ENCOUNTER — APPOINTMENT (OUTPATIENT)
Dept: LAB | Facility: MEDICAL CENTER | Age: 82
End: 2022-12-22
Payer: MEDICARE

## 2022-12-23 ENCOUNTER — HOSPITAL ENCOUNTER (OUTPATIENT)
Dept: LAB | Facility: MEDICAL CENTER | Age: 82
End: 2022-12-23
Attending: FAMILY MEDICINE
Payer: MEDICARE

## 2022-12-23 LAB
ALBUMIN SERPL BCP-MCNC: 3.8 G/DL (ref 3.2–4.9)
ALBUMIN/GLOB SERPL: 1.1 G/DL
ALP SERPL-CCNC: 105 U/L (ref 30–99)
ALT SERPL-CCNC: 10 U/L (ref 2–50)
ANION GAP SERPL CALC-SCNC: 11 MMOL/L (ref 7–16)
AST SERPL-CCNC: 16 U/L (ref 12–45)
BILIRUB SERPL-MCNC: 0.5 MG/DL (ref 0.1–1.5)
BUN SERPL-MCNC: 21 MG/DL (ref 8–22)
CALCIUM ALBUM COR SERPL-MCNC: 9.5 MG/DL (ref 8.5–10.5)
CALCIUM SERPL-MCNC: 9.3 MG/DL (ref 8.5–10.5)
CHLORIDE SERPL-SCNC: 103 MMOL/L (ref 96–112)
CHOLEST SERPL-MCNC: 143 MG/DL (ref 100–199)
CO2 SERPL-SCNC: 24 MMOL/L (ref 20–33)
CREAT SERPL-MCNC: 0.89 MG/DL (ref 0.5–1.4)
FASTING STATUS PATIENT QL REPORTED: NORMAL
GFR SERPLBLD CREATININE-BSD FMLA CKD-EPI: 64 ML/MIN/1.73 M 2
GLOBULIN SER CALC-MCNC: 3.4 G/DL (ref 1.9–3.5)
GLUCOSE SERPL-MCNC: 107 MG/DL (ref 65–99)
HDLC SERPL-MCNC: 58 MG/DL
LDLC SERPL CALC-MCNC: 68 MG/DL
POTASSIUM SERPL-SCNC: 4 MMOL/L (ref 3.6–5.5)
PROT SERPL-MCNC: 7.2 G/DL (ref 6–8.2)
SODIUM SERPL-SCNC: 138 MMOL/L (ref 135–145)
T4 FREE SERPL-MCNC: 1.14 NG/DL (ref 0.93–1.7)
TRIGL SERPL-MCNC: 83 MG/DL (ref 0–149)

## 2022-12-23 PROCEDURE — 84439 ASSAY OF FREE THYROXINE: CPT

## 2022-12-23 PROCEDURE — 36415 COLL VENOUS BLD VENIPUNCTURE: CPT

## 2022-12-23 PROCEDURE — 85027 COMPLETE CBC AUTOMATED: CPT

## 2022-12-23 PROCEDURE — 80061 LIPID PANEL: CPT

## 2022-12-23 PROCEDURE — 84479 ASSAY OF THYROID (T3 OR T4): CPT

## 2022-12-23 PROCEDURE — 83036 HEMOGLOBIN GLYCOSYLATED A1C: CPT

## 2022-12-23 PROCEDURE — 80053 COMPREHEN METABOLIC PANEL: CPT

## 2022-12-26 LAB — T3RU NFR SERPL: 34 % (ref 28–41)

## 2022-12-29 ENCOUNTER — HOSPITAL ENCOUNTER (OUTPATIENT)
Dept: LAB | Facility: MEDICAL CENTER | Age: 82
End: 2022-12-29
Attending: NURSE PRACTITIONER
Payer: MEDICARE

## 2022-12-29 ENCOUNTER — HOSPITAL ENCOUNTER (OUTPATIENT)
Dept: LAB | Facility: MEDICAL CENTER | Age: 82
End: 2022-12-29
Attending: FAMILY MEDICINE
Payer: MEDICARE

## 2022-12-29 LAB
ERYTHROCYTE [DISTWIDTH] IN BLOOD BY AUTOMATED COUNT: 47.9 FL (ref 35.9–50)
HCT VFR BLD AUTO: 42.3 % (ref 37–47)
HGB BLD-MCNC: 13.1 G/DL (ref 12–16)
MCH RBC QN AUTO: 28.7 PG (ref 27–33)
MCHC RBC AUTO-ENTMCNC: 31 G/DL (ref 33.6–35)
MCV RBC AUTO: 92.6 FL (ref 81.4–97.8)
PLATELET # BLD AUTO: 279 K/UL (ref 164–446)
PMV BLD AUTO: 9.7 FL (ref 9–12.9)
RBC # BLD AUTO: 4.57 M/UL (ref 4.2–5.4)
WBC # BLD AUTO: 5.2 K/UL (ref 4.8–10.8)

## 2022-12-29 PROCEDURE — 85027 COMPLETE CBC AUTOMATED: CPT

## 2022-12-29 PROCEDURE — 36415 COLL VENOUS BLD VENIPUNCTURE: CPT

## 2022-12-29 PROCEDURE — 86769 SARS-COV-2 COVID-19 ANTIBODY: CPT

## 2022-12-29 PROCEDURE — 83036 HEMOGLOBIN GLYCOSYLATED A1C: CPT

## 2022-12-30 LAB
EST. AVERAGE GLUCOSE BLD GHB EST-MCNC: 126 MG/DL
HBA1C MFR BLD: 6 % (ref 4–5.6)

## 2023-01-03 LAB — SARS-COV-2 IGG SERPLBLD QL IA.RAPID: NEGATIVE

## 2023-01-17 ENCOUNTER — TELEPHONE (OUTPATIENT)
Dept: HEALTH INFORMATION MANAGEMENT | Facility: OTHER | Age: 83
End: 2023-01-17
Payer: MEDICARE

## 2023-02-28 PROBLEM — I70.0 AORTIC ATHEROSCLEROSIS (HCC): Status: ACTIVE | Noted: 2023-02-28

## 2023-02-28 PROBLEM — I73.9 PAD (PERIPHERAL ARTERY DISEASE) (HCC): Status: ACTIVE | Noted: 2023-02-28

## 2023-02-28 PROBLEM — I27.20 PULMONARY HYPERTENSION (HCC): Status: ACTIVE | Noted: 2023-02-28

## 2023-02-28 PROBLEM — G31.9 CEREBRAL ATROPHY (HCC): Status: ACTIVE | Noted: 2023-02-28

## 2023-09-21 ENCOUNTER — HOSPITAL ENCOUNTER (OUTPATIENT)
Dept: LAB | Facility: MEDICAL CENTER | Age: 83
End: 2023-09-21
Attending: NURSE PRACTITIONER
Payer: MEDICARE

## 2023-09-21 LAB
ALBUMIN SERPL BCP-MCNC: 4.1 G/DL (ref 3.2–4.9)
ALBUMIN/GLOB SERPL: 1.2 G/DL
ALP SERPL-CCNC: 113 U/L (ref 30–99)
ALT SERPL-CCNC: 8 U/L (ref 2–50)
ANION GAP SERPL CALC-SCNC: 12 MMOL/L (ref 7–16)
AST SERPL-CCNC: 16 U/L (ref 12–45)
BILIRUB SERPL-MCNC: 0.4 MG/DL (ref 0.1–1.5)
BUN SERPL-MCNC: 18 MG/DL (ref 8–22)
CALCIUM ALBUM COR SERPL-MCNC: 9.2 MG/DL (ref 8.5–10.5)
CALCIUM SERPL-MCNC: 9.3 MG/DL (ref 8.5–10.5)
CHLORIDE SERPL-SCNC: 104 MMOL/L (ref 96–112)
CO2 SERPL-SCNC: 23 MMOL/L (ref 20–33)
CREAT SERPL-MCNC: 0.82 MG/DL (ref 0.5–1.4)
CRP SERPL HS-MCNC: 0.56 MG/DL (ref 0–0.75)
FOLATE SERPL-MCNC: 22.9 NG/ML
GFR SERPLBLD CREATININE-BSD FMLA CKD-EPI: 70 ML/MIN/1.73 M 2
GLOBULIN SER CALC-MCNC: 3.5 G/DL (ref 1.9–3.5)
GLUCOSE SERPL-MCNC: 99 MG/DL (ref 65–99)
POTASSIUM SERPL-SCNC: 4.4 MMOL/L (ref 3.6–5.5)
PROT SERPL-MCNC: 7.6 G/DL (ref 6–8.2)
SODIUM SERPL-SCNC: 139 MMOL/L (ref 135–145)
T PALLIDUM AB SER QL IA: NORMAL
VIT B12 SERPL-MCNC: 405 PG/ML (ref 211–911)

## 2023-09-21 PROCEDURE — 86780 TREPONEMA PALLIDUM: CPT

## 2023-09-21 PROCEDURE — 36415 COLL VENOUS BLD VENIPUNCTURE: CPT

## 2023-09-21 PROCEDURE — 86140 C-REACTIVE PROTEIN: CPT

## 2023-09-21 PROCEDURE — 82607 VITAMIN B-12: CPT

## 2023-09-21 PROCEDURE — 86235 NUCLEAR ANTIGEN ANTIBODY: CPT | Mod: 91

## 2023-09-21 PROCEDURE — 86777 TOXOPLASMA ANTIBODY: CPT

## 2023-09-21 PROCEDURE — 86617 LYME DISEASE ANTIBODY: CPT | Mod: 91

## 2023-09-21 PROCEDURE — 86225 DNA ANTIBODY NATIVE: CPT

## 2023-09-21 PROCEDURE — 86039 ANTINUCLEAR ANTIBODIES (ANA): CPT

## 2023-09-21 PROCEDURE — 83516 IMMUNOASSAY NONANTIBODY: CPT | Mod: 91

## 2023-09-21 PROCEDURE — 86480 TB TEST CELL IMMUN MEASURE: CPT

## 2023-09-21 PROCEDURE — 85025 COMPLETE CBC W/AUTO DIFF WBC: CPT

## 2023-09-21 PROCEDURE — 86038 ANTINUCLEAR ANTIBODIES: CPT

## 2023-09-21 PROCEDURE — 86778 TOXOPLASMA ANTIBODY IGM: CPT

## 2023-09-21 PROCEDURE — 82164 ANGIOTENSIN I ENZYME TEST: CPT

## 2023-09-21 PROCEDURE — 85549 MURAMIDASE: CPT

## 2023-09-21 PROCEDURE — 81379 HLA I TYPING COMPLETE HR: CPT

## 2023-09-21 PROCEDURE — 82746 ASSAY OF FOLIC ACID SERUM: CPT

## 2023-09-21 PROCEDURE — 80053 COMPREHEN METABOLIC PANEL: CPT

## 2023-09-22 LAB
BASOPHILS # BLD AUTO: 0.9 % (ref 0–1.8)
BASOPHILS # BLD: 0.08 K/UL (ref 0–0.12)
EOSINOPHIL # BLD AUTO: 0.3 K/UL (ref 0–0.51)
EOSINOPHIL NFR BLD: 3.5 % (ref 0–6.9)
ERYTHROCYTE [DISTWIDTH] IN BLOOD BY AUTOMATED COUNT: 45.3 FL (ref 35.9–50)
HCT VFR BLD AUTO: 45.7 % (ref 37–47)
HGB BLD-MCNC: 13.8 G/DL (ref 12–16)
IMM GRANULOCYTES # BLD AUTO: 0.02 K/UL (ref 0–0.11)
IMM GRANULOCYTES NFR BLD AUTO: 0.2 % (ref 0–0.9)
LYMPHOCYTES # BLD AUTO: 2.6 K/UL (ref 1–4.8)
LYMPHOCYTES NFR BLD: 30.6 % (ref 22–41)
MCH RBC QN AUTO: 27.2 PG (ref 27–33)
MCHC RBC AUTO-ENTMCNC: 30.2 G/DL (ref 32.2–35.5)
MCV RBC AUTO: 90 FL (ref 81.4–97.8)
MONOCYTES # BLD AUTO: 0.42 K/UL (ref 0–0.85)
MONOCYTES NFR BLD AUTO: 4.9 % (ref 0–13.4)
NEUTROPHILS # BLD AUTO: 5.08 K/UL (ref 1.82–7.42)
NEUTROPHILS NFR BLD: 59.9 % (ref 44–72)
NRBC # BLD AUTO: 0 K/UL
NRBC BLD-RTO: 0 /100 WBC (ref 0–0.2)
PLATELET # BLD AUTO: 255 K/UL (ref 164–446)
PMV BLD AUTO: 11.7 FL (ref 9–12.9)
RBC # BLD AUTO: 5.08 M/UL (ref 4.2–5.4)
WBC # BLD AUTO: 8.5 K/UL (ref 4.8–10.8)

## 2023-09-23 LAB
ACE SERPL-CCNC: <10 U/L (ref 16–85)
NUCLEAR IGG SER QL IA: DETECTED
T GONDII IGG SER-ACNC: <3 IU/ML
T GONDII IGM SER-ACNC: <3 AU/ML

## 2023-09-24 LAB
ANA INTERPRETIVE COMMENT Q5143: ABNORMAL
ANA PATTERN Q5144: ABNORMAL
ANA TITER Q5145: ABNORMAL
ANTINUCLEAR ANTIBODY (ANA), HEP-2, IGG Q5142: DETECTED
LYSOZYME SERPL-MCNC: 4.12 UG/ML
MYELOPEROXIDASE AB SER-ACNC: 0 AU/ML (ref 0–19)
PROTEINASE3 AB SER-ACNC: 4 AU/ML (ref 0–19)

## 2023-09-25 LAB
B BURGDOR IGG SER QL IB: POSITIVE
B BURGDOR IGM SER QL IB: NEGATIVE
DSDNA AB TITR SER CLIF: NORMAL {TITER}
ENA JO1 AB TITR SER: 3 AU/ML (ref 0–40)
ENA SCL70 IGG SER QL: 14 AU/ML (ref 0–40)
ENA SM IGG SER-ACNC: 4 AU/ML (ref 0–40)
ENA SS-B IGG SER IA-ACNC: 0 AU/ML (ref 0–40)
GAMMA INTERFERON BACKGROUND BLD IA-ACNC: 0.22 IU/ML
M TB IFN-G BLD-IMP: POSITIVE
M TB IFN-G CD4+ BCKGRND COR BLD-ACNC: 6.55 IU/ML
MITOGEN IGNF BCKGRD COR BLD-ACNC: >10 IU/ML
QFT TB2 - NIL TBQ2: 7.14 IU/ML
SSA52 R0ENA AB IGG Q0420: 27 AU/ML (ref 0–40)
SSA60 R0ENA AB IGG Q0419: 2 AU/ML (ref 0–40)
U1 SNRNP IGG SER QL: 3 UNITS (ref 0–19)

## 2023-09-29 LAB
HLA-A LOCUS: NORMAL
HLA-A LOCUS: NORMAL
HLA-A+B+C SBT HIGH RES-IMP: NORMAL
HLA-B: NORMAL
HLA-B: NORMAL
HLA-CW LOCUS: NORMAL
HLA-CW LOCUS: NORMAL
Lab: 4
Lab: 6
PATHOLOGY STUDY: NORMAL

## 2024-01-01 ENCOUNTER — APPOINTMENT (OUTPATIENT)
Dept: RADIOLOGY | Facility: MEDICAL CENTER | Age: 84
End: 2024-01-01
Attending: EMERGENCY MEDICINE
Payer: MEDICARE

## 2024-01-01 ENCOUNTER — HOSPITAL ENCOUNTER (EMERGENCY)
Facility: MEDICAL CENTER | Age: 84
End: 2024-01-01
Attending: EMERGENCY MEDICINE
Payer: MEDICARE

## 2024-01-01 VITALS
DIASTOLIC BLOOD PRESSURE: 88 MMHG | TEMPERATURE: 98.2 F | SYSTOLIC BLOOD PRESSURE: 216 MMHG | HEIGHT: 63 IN | OXYGEN SATURATION: 93 % | RESPIRATION RATE: 16 BRPM | WEIGHT: 145 LBS | HEART RATE: 72 BPM | BODY MASS INDEX: 25.69 KG/M2

## 2024-01-01 DIAGNOSIS — G45.9 TIA (TRANSIENT ISCHEMIC ATTACK): ICD-10-CM

## 2024-01-01 DIAGNOSIS — R53.1 WEAKNESS: ICD-10-CM

## 2024-01-01 DIAGNOSIS — I10 PRIMARY HYPERTENSION: ICD-10-CM

## 2024-01-01 LAB
ABO GROUP BLD: NORMAL
ALBUMIN SERPL BCP-MCNC: 3.9 G/DL (ref 3.2–4.9)
ALBUMIN/GLOB SERPL: 1.1 G/DL
ALP SERPL-CCNC: 135 U/L (ref 30–99)
ALT SERPL-CCNC: 7 U/L (ref 2–50)
ANION GAP SERPL CALC-SCNC: 15 MMOL/L (ref 7–16)
APTT PPP: 29.5 SEC (ref 24.7–36)
AST SERPL-CCNC: 17 U/L (ref 12–45)
BASOPHILS # BLD AUTO: 1 % (ref 0–1.8)
BASOPHILS # BLD: 0.16 K/UL (ref 0–0.12)
BILIRUB SERPL-MCNC: 0.5 MG/DL (ref 0.1–1.5)
BLD GP AB SCN SERPL QL: NORMAL
BUN SERPL-MCNC: 23 MG/DL (ref 8–22)
CALCIUM ALBUM COR SERPL-MCNC: 9.2 MG/DL (ref 8.5–10.5)
CALCIUM SERPL-MCNC: 9.1 MG/DL (ref 8.5–10.5)
CHLORIDE SERPL-SCNC: 107 MMOL/L (ref 96–112)
CO2 SERPL-SCNC: 18 MMOL/L (ref 20–33)
CREAT SERPL-MCNC: 0.96 MG/DL (ref 0.5–1.4)
EKG IMPRESSION: NORMAL
EOSINOPHIL # BLD AUTO: 0.16 K/UL (ref 0–0.51)
EOSINOPHIL NFR BLD: 1 % (ref 0–6.9)
ERYTHROCYTE [DISTWIDTH] IN BLOOD BY AUTOMATED COUNT: 44.9 FL (ref 35.9–50)
GFR SERPLBLD CREATININE-BSD FMLA CKD-EPI: 58 ML/MIN/1.73 M 2
GLOBULIN SER CALC-MCNC: 3.7 G/DL (ref 1.9–3.5)
GLUCOSE SERPL-MCNC: 118 MG/DL (ref 65–99)
HCT VFR BLD AUTO: 43.8 % (ref 37–47)
HGB BLD-MCNC: 14.5 G/DL (ref 12–16)
IMM GRANULOCYTES # BLD AUTO: 0.07 K/UL (ref 0–0.11)
IMM GRANULOCYTES NFR BLD AUTO: 0.4 % (ref 0–0.9)
INR PPP: 1.05 (ref 0.87–1.13)
LYMPHOCYTES # BLD AUTO: 2.64 K/UL (ref 1–4.8)
LYMPHOCYTES NFR BLD: 16.6 % (ref 22–41)
MCH RBC QN AUTO: 29 PG (ref 27–33)
MCHC RBC AUTO-ENTMCNC: 33.1 G/DL (ref 32.2–35.5)
MCV RBC AUTO: 87.6 FL (ref 81.4–97.8)
MONOCYTES # BLD AUTO: 0.78 K/UL (ref 0–0.85)
MONOCYTES NFR BLD AUTO: 4.9 % (ref 0–13.4)
NEUTROPHILS # BLD AUTO: 12.08 K/UL (ref 1.82–7.42)
NEUTROPHILS NFR BLD: 76.1 % (ref 44–72)
NRBC # BLD AUTO: 0.03 K/UL
NRBC BLD-RTO: 0.2 /100 WBC (ref 0–0.2)
PLATELET # BLD AUTO: 291 K/UL (ref 164–446)
PMV BLD AUTO: 10.7 FL (ref 9–12.9)
POTASSIUM SERPL-SCNC: 4.2 MMOL/L (ref 3.6–5.5)
PROT SERPL-MCNC: 7.6 G/DL (ref 6–8.2)
PROTHROMBIN TIME: 13.8 SEC (ref 12–14.6)
RBC # BLD AUTO: 5 M/UL (ref 4.2–5.4)
RH BLD: NORMAL
SODIUM SERPL-SCNC: 140 MMOL/L (ref 135–145)
TROPONIN T SERPL-MCNC: 8 NG/L (ref 6–19)
WBC # BLD AUTO: 12.1 K/UL (ref 4.8–10.8)

## 2024-01-01 PROCEDURE — 86900 BLOOD TYPING SEROLOGIC ABO: CPT

## 2024-01-01 PROCEDURE — 70498 CT ANGIOGRAPHY NECK: CPT

## 2024-01-01 PROCEDURE — 80053 COMPREHEN METABOLIC PANEL: CPT

## 2024-01-01 PROCEDURE — 99285 EMERGENCY DEPT VISIT HI MDM: CPT

## 2024-01-01 PROCEDURE — 85730 THROMBOPLASTIN TIME PARTIAL: CPT

## 2024-01-01 PROCEDURE — 71045 X-RAY EXAM CHEST 1 VIEW: CPT

## 2024-01-01 PROCEDURE — 96374 THER/PROPH/DIAG INJ IV PUSH: CPT | Mod: XU

## 2024-01-01 PROCEDURE — 85610 PROTHROMBIN TIME: CPT

## 2024-01-01 PROCEDURE — 700102 HCHG RX REV CODE 250 W/ 637 OVERRIDE(OP): Performed by: EMERGENCY MEDICINE

## 2024-01-01 PROCEDURE — 700117 HCHG RX CONTRAST REV CODE 255: Performed by: EMERGENCY MEDICINE

## 2024-01-01 PROCEDURE — 700111 HCHG RX REV CODE 636 W/ 250 OVERRIDE (IP): Mod: JZ

## 2024-01-01 PROCEDURE — 70496 CT ANGIOGRAPHY HEAD: CPT

## 2024-01-01 PROCEDURE — 93005 ELECTROCARDIOGRAM TRACING: CPT | Performed by: EMERGENCY MEDICINE

## 2024-01-01 PROCEDURE — 700111 HCHG RX REV CODE 636 W/ 250 OVERRIDE (IP): Performed by: EMERGENCY MEDICINE

## 2024-01-01 PROCEDURE — 96375 TX/PRO/DX INJ NEW DRUG ADDON: CPT | Mod: XU

## 2024-01-01 PROCEDURE — 99284 EMERGENCY DEPT VISIT MOD MDM: CPT | Mod: FS | Performed by: NURSE PRACTITIONER

## 2024-01-01 PROCEDURE — A9270 NON-COVERED ITEM OR SERVICE: HCPCS | Performed by: EMERGENCY MEDICINE

## 2024-01-01 PROCEDURE — 85025 COMPLETE CBC W/AUTO DIFF WBC: CPT

## 2024-01-01 PROCEDURE — 86850 RBC ANTIBODY SCREEN: CPT

## 2024-01-01 PROCEDURE — 0042T CT-CEREBRAL PERFUSION ANALYSIS: CPT

## 2024-01-01 PROCEDURE — 84484 ASSAY OF TROPONIN QUANT: CPT

## 2024-01-01 PROCEDURE — 86901 BLOOD TYPING SEROLOGIC RH(D): CPT

## 2024-01-01 PROCEDURE — 36415 COLL VENOUS BLD VENIPUNCTURE: CPT

## 2024-01-01 PROCEDURE — 70450 CT HEAD/BRAIN W/O DYE: CPT

## 2024-01-01 RX ORDER — DIPHENHYDRAMINE HYDROCHLORIDE 50 MG/ML
INJECTION INTRAMUSCULAR; INTRAVENOUS
Status: COMPLETED
Start: 2024-01-01 | End: 2024-01-01

## 2024-01-01 RX ORDER — ONDANSETRON 2 MG/ML
4 INJECTION INTRAMUSCULAR; INTRAVENOUS ONCE
Status: COMPLETED | OUTPATIENT
Start: 2024-01-01 | End: 2024-01-01

## 2024-01-01 RX ORDER — METHYLPREDNISOLONE SODIUM SUCCINATE 125 MG/2ML
125 INJECTION, POWDER, LYOPHILIZED, FOR SOLUTION INTRAMUSCULAR; INTRAVENOUS ONCE
Status: COMPLETED | OUTPATIENT
Start: 2024-01-01 | End: 2024-01-01

## 2024-01-01 RX ORDER — DIPHENHYDRAMINE HYDROCHLORIDE 50 MG/ML
50 INJECTION INTRAMUSCULAR; INTRAVENOUS ONCE
Status: COMPLETED | OUTPATIENT
Start: 2024-01-01 | End: 2024-01-01

## 2024-01-01 RX ORDER — AMLODIPINE BESYLATE 5 MG/1
10 TABLET ORAL ONCE
Status: COMPLETED | OUTPATIENT
Start: 2024-01-01 | End: 2024-01-01

## 2024-01-01 RX ORDER — METHYLPREDNISOLONE SODIUM SUCCINATE 125 MG/2ML
INJECTION, POWDER, LYOPHILIZED, FOR SOLUTION INTRAMUSCULAR; INTRAVENOUS
Status: COMPLETED
Start: 2024-01-01 | End: 2024-01-01

## 2024-01-01 RX ORDER — CLOPIDOGREL BISULFATE 75 MG/1
75 TABLET ORAL DAILY
Qty: 30 TABLET | Refills: 0 | Status: SHIPPED | OUTPATIENT
Start: 2024-01-01

## 2024-01-01 RX ADMIN — AMLODIPINE BESYLATE 10 MG: 5 TABLET ORAL at 16:24

## 2024-01-01 RX ADMIN — DIPHENHYDRAMINE HYDROCHLORIDE 50 MG: 50 INJECTION INTRAMUSCULAR; INTRAVENOUS at 14:15

## 2024-01-01 RX ADMIN — IOHEXOL 100 ML: 350 INJECTION, SOLUTION INTRAVENOUS at 14:20

## 2024-01-01 RX ADMIN — METHYLPREDNISOLONE SODIUM SUCCINATE 125 MG: 125 INJECTION, POWDER, FOR SOLUTION INTRAMUSCULAR; INTRAVENOUS at 14:16

## 2024-01-01 RX ADMIN — METHYLPREDNISOLONE SODIUM SUCCINATE 125 MG: 125 INJECTION, POWDER, LYOPHILIZED, FOR SOLUTION INTRAMUSCULAR; INTRAVENOUS at 14:16

## 2024-01-01 RX ADMIN — ONDANSETRON 4 MG: 2 INJECTION INTRAMUSCULAR; INTRAVENOUS at 14:15

## 2024-01-01 RX ADMIN — DIPHENHYDRAMINE HYDROCHLORIDE 50 MG: 50 INJECTION, SOLUTION INTRAMUSCULAR; INTRAVENOUS at 14:15

## 2024-01-01 ASSESSMENT — FIBROSIS 4 INDEX
FIB4 SCORE: 1.885618083164126731
FIB4 SCORE: 1.885618083164126731

## 2024-01-01 ASSESSMENT — ABCD2 SCORE
ABCD2 SCORE: 6
CLINICAL FEATURES OF THE TIA: UNILATERAL WEAKNESS
BP IS HIGHER THAN 140/90 MMHG: YES
DURATION OF SYMPTOMS: >60 MINUES
HISTORY OF DIABETES: NO
AGE: GREATER THAN OR EQUAL TO 60

## 2024-01-01 NOTE — ED TRIAGE NOTES
"Chief Complaint   Patient presents with    Possible Stroke     [place] reported noticing headache, dizziness, and slurred speech. Last known well 1245. Reports prior \"mini stroke.\"      Patient from Charge desk to CT then to Red 8. Report to ZULEMA Alfaro.     Initial NIHSS completed by Dr. Martinez: 2  "

## 2024-01-01 NOTE — ED NOTES
Report from ZULEMA Munson  Pt to RED 8 from CT. On monitor, call light in reach  Family at bedside.  ED tech at bedside for EKG.

## 2024-01-01 NOTE — CONSULTS
"Neurology STROKE CODE H&P  Vascular Neurology Service, [facility]    Referring Physician: Francisco Martinez M.D.    STROKE CODE:   Chief Complaint   Patient presents with    Possible Stroke     [place] reported noticing headache, dizziness, and slurred speech. Last known well 1245. Reports prior \"mini stroke.\"        To obtain the most accurate data regarding the time called, and time patient seen, refer to the stroke run-sheet and chart.  For time of CT, refer to the radiology report. See A&P below for TPA Decision and door to needle time if and when applicable.    HPI: Luz Maria Saavedra is a 83 y.o. female with a past medical history of hypertension, CKD, and TIA in 2019 who presented on 01/01/2024 with acute onset slurred speech. The patient reports eating at a casino with her family when she experienced diffuse itching to her arms, dizziness, and slurred speech. Stroke alert was activated by EMS with a last known well of 1245. On evaluation in the ED the patient has more of an intermittent stuttering speech pattern with dry heaves interspersed. She also began to develop hives on her bilateral arms and clavicles for which she was treated with benadryl and steroids. Noncontrast CT head was negative for acute findings. CTA head/neck were negative for LVO, dissection, and critical flow limiting stenoses. CT Perfusion negative for CBF defect. Neurology has been consulted for further evaluation of the above.    Review of systems: In addition to what is detailed in the HPI above, all other systems reviewed and are negative.    Past Medical History:    has a past medical history of Anxiety and depression, Hypertension, Renal disorder, and Stroke (HCC).    FHx:  family history includes No Known Problems in her father and mother.    SHx:   reports that she has never smoked. She has never used smokeless tobacco. She reports current drug use. She reports that she does " "not drink alcohol.    Allergies:  Allergies   Allergen Reactions    Amitriptyline     Ciprofloxacin     Codeine     Hydralazine      Face swelling    Other Drug      Pt states she is allergic to many antibiotics but she does not remember what they are.    Pcn [Penicillins]     Potassium Shortness of Breath     \"I feel like I can't breath\".  Has tolerate IV KCl in past    Xanax [Alprazolam]     Zoloft     Zyrtec [Cetirizine]        Medications:  No current facility-administered medications for this encounter.    Current Outpatient Medications:     lisinopril (PRINIVIL) 10 MG Tab, Take 10 mg by mouth every day., Disp: , Rfl:     gabapentin (NEURONTIN) 100 MG Cap, Take 100 mg by mouth at bedtime., Disp: , Rfl:     simvastatin (ZOCOR) 20 MG Tab, Take 1 Tab by mouth every evening. (Patient not taking: Reported on 2/28/2023), Disp: 30 Tab, Rfl: 0    amLODIPine (NORVASC) 5 MG Tab, Take 1 Tab by mouth every day. (Patient not taking: Reported on 2/28/2023), Disp: 30 Tab, Rfl: 0    tamsulosin (FLOMAX) 0.4 MG capsule, Take 1 Cap by mouth ONE-HALF HOUR AFTER BREAKFAST. (Patient not taking: Reported on 2/28/2023), Disp: 30 Cap, Rfl: 1    aspirin EC (ECOTRIN) 81 MG Tablet Delayed Response, Take 81 mg by mouth 2 Times a Day., Disp: , Rfl:     busPIRone (BUSPAR) 15 MG tablet, Take 15 mg by mouth 3 times a day as needed. (Patient not taking: Reported on 2/28/2023), Disp: , Rfl:     vitamin D, Ergocalciferol, (DRISDOL) 74631 UNITS CAPS capsule, Take 50,000 Units by mouth every Saturday. saturday (Patient not taking: Reported on 2/28/2023), Disp: , Rfl:     Physical Examination:    Vitals:    01/01/24 1345 01/01/24 1422 01/01/24 1425   BP:   (!) 204/83   Pulse:   79   Resp:   (!) 24   SpO2:   94%   Weight: 65.8 kg (145 lb) 65.8 kg (145 lb)    Height:  1.6 m (5' 3\")          General: Patient is awake and in no acute distress  Eye: Examination of optic disks not indicated at this time given acuity of consult  Neck: There is normal range " of motion  CV: Regular rate   Extremities:  Clear, dry, intact, without peripheral edema    NEUROLOGICAL EXAM:     Mental status: Awake, alert and fully oriented  Speech and language: Speech is stuttering with intermittent paucity associated with dry heaves The patient is able to name and repeat, and follow commands  Cranial nerve exam: Pupils are equal, round and reactive to light bilaterally. Visual fields are full. There is no nystagmus. Extraocular muscles are intact. Face is symmetric. Sensation in the face is intact to light touch. Palate elevates symmetrically. Tongue is midline.  Motor exam: There is sustained antigravity with no downward drift in bilateral arms. Active resistance to bilateral lower extremity lifting. No neglect to double stim.  Deep tendon reflexes:  2+ throughout. Toes down-going bilaterally.  Coordination: No ataxia on bilateral finger-to-nose testing.  Gait: Deferred due to patient preference.    NIHSS: National Institutes of Health Stroke Scale    [0] 1a:Level of Consciousness    0-alert 1-drowsy   2-stupor   3-coma  [0] 1b:LOC Questions                  0-both  1-one      2-neither  [0] 1c:LOC Commands                   0-both  1-one      2-neither  [0] 2: Best Gaze                     0-nl    1-partial  2-forced  [0] 3: Visual Fields                   0-nl    1-partial  2-complete 3-bilat  [0] 4: Facial Paresis                0-nl    1-minor    2-partial  3-full  MOTOR                       0-nl  [0] 5: Right Arm           1-drift  [0] 6: Left Arm             2-some effort vs gravity  [1] 7: Right Leg           3-no effort vs gravity  [1] 8: Left Leg             4-no movement                             x-untestable  [0] 9: Limb Ataxia                    0-abs   1-1_limb   2-2+_limbs       x-untestable  [0] 10:Sensory                        0-nl    1-partial  2-dense  [0] 11:Best Language/Aphasia         0-nl    1-mild/mod 2-severe   3-mute  [1] 12:Dysarthria                     0-nl     1-mild/mod 2-severe       x-untestable  [0] 13:Neglect/Inattention            0-none  1-partial  2-complete  [3] TOTAL    NIHSS Date/Time: 01/01/2024 @ 1400    Baseline Modified Lisa Scale (MRS): 1 = No significant disability, despite symptoms; able to perform all usual duties and activities    Objective Data:    Labs:  Lab Results   Component Value Date/Time    PROTHROMBTM 14.5 04/02/2019 12:27 PM    INR 1.12 04/02/2019 12:27 PM      Lab Results   Component Value Date/Time    WBC 8.5 09/21/2023 03:14 PM    RBC 5.08 09/21/2023 03:14 PM    HEMOGLOBIN 13.8 09/21/2023 03:14 PM    HEMATOCRIT 45.7 09/21/2023 03:14 PM    MCV 90.0 09/21/2023 03:14 PM    MCH 27.2 09/21/2023 03:14 PM    MCHC 30.2 (L) 09/21/2023 03:14 PM    MPV 11.7 09/21/2023 03:14 PM    NEUTSPOLYS 59.90 09/21/2023 03:14 PM    LYMPHOCYTES 30.60 09/21/2023 03:14 PM    MONOCYTES 4.90 09/21/2023 03:14 PM    EOSINOPHILS 3.50 09/21/2023 03:14 PM    BASOPHILS 0.90 09/21/2023 03:14 PM    HYPOCHROMIA 1+ 05/12/2014 10:57 AM    ANISOCYTOSIS 1+ 08/14/2012 05:50 AM      Lab Results   Component Value Date/Time    SODIUM 139 09/21/2023 03:14 PM    POTASSIUM 4.4 09/21/2023 03:14 PM    CHLORIDE 104 09/21/2023 03:14 PM    CO2 23 09/21/2023 03:14 PM    GLUCOSE 99 09/21/2023 03:14 PM    BUN 18 09/21/2023 03:14 PM    CREATININE 0.82 09/21/2023 03:14 PM    CREATININE 0.9 11/19/2008 04:55 PM    BUNCREATRAT 11.3 10/11/2022 05:20 AM      Lab Results   Component Value Date/Time    CHOLSTRLTOT 143 12/23/2022 11:30 AM    LDL 68 12/23/2022 11:30 AM    HDL 58 12/23/2022 11:30 AM    TRIGLYCERIDE 83 12/23/2022 11:30 AM       Lab Results   Component Value Date/Time    ALKPHOSPHAT 113 (H) 09/21/2023 03:14 PM    ASTSGOT 16 09/21/2023 03:14 PM    ALTSGPT 8 09/21/2023 03:14 PM    TBILIRUBIN 0.4 09/21/2023 03:14 PM        Imaging/Testing:    I interpreted and/or reviewed the patient's neuroimaging    CT-CTA HEAD WITH & W/O-POST PROCESS   Final Result         1. No hemodynamically  significant narrowing of the major intracranial vessels.      CT-CEREBRAL PERFUSION ANALYSIS   Final Result      1.  Cerebral blood flow less than 30% likely representing completed infarct = 0 mL.      2.  T Max more than 6 seconds likely representing combination of completed infarct and ischemia = 0 mL.      3.  Mismatched volume likely representing ischemic brain/penumbra = None      4.  Please note that the cerebral perfusion was performed on the limited brain tissue around the basal ganglia region. Infarct/ischemia outside the CT perfusion sections can be missed in this study.      CT-HEAD W/O   Final Result         1. No acute intracranial findings. No evidence of acute intracranial hemorrhage or mass lesion.      2. White matter lucencies most consistent with chronic small vessel ischemic change.                     DX-CHEST-PORTABLE (1 VIEW)    (Results Pending)   CT-CTA NECK WITH & W/O-POST PROCESSING    (Results Pending)       Assessment and Plan:    83 y.o. female presenting with acute onset dysarthria, nausea, dizziness, and diffuse itching to her arms after eating [place] a casino. Stroke Protocol CT head negative for ICH or any other acute intracranial abnormalities. CTA head/neck negative for LVO, dissection, and flow limiting stenoses. CT Perfusion negative for CBF defect. With low debility and transient nature of symptoms the patient was not a candidate for thrombolytic therapy. There is low suspicion for an acute ischemic process at this time. Patient subsequently developed hives throughout the encounter for which she received benadryl and steroids. Recommend toxic/metabolic/anaphylactic work up for further evaluation. Please reconsult neurology with any acute findings.      Case reviewed and plan created with Dr. Nunes, Vascular Neurology. Please call with any questions.      John JOHNSON  Vascular Neurology, [facility]

## 2024-01-01 NOTE — ED PROVIDER NOTES
"CHIEF COMPLAINT  Chief Complaint   Patient presents with    Possible Stroke     Patient was at the [facility] today when family reported noticing headache, dizziness, and slurred speech. Last known well 1245. Reports prior \"mini stroke.\"        LIMITATION TO HISTORY   Select: none    HPI    Luz Maria Saavedra is a 83 y.o. female who presents to the Emergency Department via EMS for evaluation of a possible stroke. The patient was brought in from the [facility] where she was with her family. She was last known well at 12:45 PM and then began to experience a headache, dizziness, weakness, and slurred speech. Per EMS, the patient's daughter had stated that the patient's speech is normally very clear. EMS also reports that the patient's speech waxes and wanes. EMS also notes that the patient is retching, but has not had any episode of emesis. The patient reports that she was seen at [place] some time ago and was found to have had a mini stroke. She is on a baby aspirin and lisinopril.      OUTSIDE HISTORIAN(S):  Select: EMS at bedside to confirm sequence of events and collateral information provided.     EXTERNAL RECORDS REVIEWED  Select: The patient was seen on 10/09/2022 for an unspecified cerebral artery occlusion with cerebral infarction. The patient had slurred speech, facial weakness, and leg weakness.    PAST MEDICAL HISTORY  Past Medical History:   Diagnosis Date    Anxiety and depression     Hypertension     Renal disorder     Stroke (HCC)      SURGICAL HISTORY  Past Surgical History:   Procedure Laterality Date    EXPLORATORY LAPAROTOMY  8/4/2012    Performed by KELI RAMIREZ at SURGERY [facility]    CATARACT PHACO WITH IOL  2/10/2009    Performed by ZORAIDA LÓPEZ at SURGERY SAME DAY [facility]    CATARACT PHACO WITH IOL  1/27/2009    Performed by ZORAIDA LÓPEZ at SURGERY SAME DAY [facility]    NEUROMA EXCISION  11/21/08    Performed by MARCUS RIGGINS at SURGERY SAME DAY [facility]    MIMI BY " "LAPAROSCOPY      CHOLECYSTECTOMY      HIATAL HERNIA REPAIR       FAMILY HISTORY  Family History   Problem Relation Age of Onset    No Known Problems Mother     No Known Problems Father       SOCIAL HISTORY  Social History     Tobacco Use    Smoking status: Never    Smokeless tobacco: Never   Substance Use Topics    Alcohol use: No    Drug use: Yes     CURRENT MEDICATIONS  No current facility-administered medications on file prior to encounter.     Current Outpatient Medications on File Prior to Encounter   Medication Sig Dispense Refill    lisinopril (PRINIVIL) 10 MG Tab Take 10 mg by mouth every day.      gabapentin (NEURONTIN) 100 MG Cap Take 100 mg by mouth at bedtime.      simvastatin (ZOCOR) 20 MG Tab Take 1 Tab by mouth every evening. (Patient not taking: Reported on 2/28/2023) 30 Tab 0    amLODIPine (NORVASC) 5 MG Tab Take 1 Tab by mouth every day. (Patient not taking: Reported on 2/28/2023) 30 Tab 0    tamsulosin (FLOMAX) 0.4 MG capsule Take 1 Cap by mouth ONE-HALF HOUR AFTER BREAKFAST. (Patient not taking: Reported on 2/28/2023) 30 Cap 1    aspirin EC (ECOTRIN) 81 MG Tablet Delayed Response Take 81 mg by mouth 2 Times a Day.      busPIRone (BUSPAR) 15 MG tablet Take 15 mg by mouth 3 times a day as needed. (Patient not taking: Reported on 2/28/2023)      vitamin D, Ergocalciferol, (DRISDOL) 55351 UNITS CAPS capsule Take 50,000 Units by mouth every Saturday. saturday (Patient not taking: Reported on 2/28/2023)       ALLERGIES  Allergies   Allergen Reactions    Amitriptyline     Ciprofloxacin     Codeine     Hydralazine      Face swelling    Other Drug      Pt states she is allergic to many antibiotics but she does not remember what they are.    Pcn [Penicillins]     Potassium Shortness of Breath     \"I feel like I can't breath\".  Has tolerate IV KCl in past    Xanax [Alprazolam]     Zoloft     Zyrtec [Cetirizine]        PHYSICAL EXAM  VITAL SIGNS:BP (!) 204/79   Pulse 71   Temp 36.8 °C (98.2 °F) (Temporal)  " " Resp 16   Ht 1.6 m (5' 3\")   Wt 65.8 kg (145 lb)   SpO2 92%   BMI 25.69 kg/m²       Constitutional: Well-developed no acute distress   HENT: Normocephalic, Atraumatic, Bilateral external ears normal.  Eyes:  conjunctiva are normal.   Neck: Supple.  Nontender midline  Cardiovascular: Regular rate and rhythm without murmurs gallops or rubs.   Thorax & Lungs: No respiratory distress. Breathing comfortably. Lungs are clear to auscultation bilaterally, there are no wheezes no rales. Chest wall is nontender.  Abdomen: Soft, non distended, non tender   Skin: Warm, Dry, No erythema,   Back: No tenderness, No CVA tenderness.  Musculoskeletal: No clubbing cyanosis or edema good range of motion   Neurologic: Alert & oriented x 3, normal sensation. Left arm and left lower extremity weakness.  There is no drift in the left upper extremity there is a slight drift of left lower extremity.  The patient does have some dysarthria but no aphasia.  NIH was 2   psychiatric: Affect normal, Judgment normal, Mood normal.       DIAGNOSTIC STUDIES / PROCEDURES    EKG  I have independently interpreted this EKG  Interpreted below by myself     LABS  Results for orders placed or performed during the hospital encounter of 01/01/24   CBC WITH DIFFERENTIAL   Result Value Ref Range    WBC 12.1 (H) 4.8 - 10.8 K/uL    RBC 5.00 4.20 - 5.40 M/uL    Hemoglobin 14.5 12.0 - 16.0 g/dL    Hematocrit 43.8 37.0 - 47.0 %    MCV 87.6 81.4 - 97.8 fL    MCH 29.0 27.0 - 33.0 pg    MCHC 33.1 32.2 - 35.5 g/dL    RDW 44.9 35.9 - 50.0 fL    Platelet Count 291 164 - 446 K/uL    MPV 10.7 9.0 - 12.9 fL    Neutrophils-Polys 76.10 (H) 44.00 - 72.00 %    Lymphocytes 16.60 (L) 22.00 - 41.00 %    Monocytes 4.90 0.00 - 13.40 %    Eosinophils 1.00 0.00 - 6.90 %    Basophils 1.00 0.00 - 1.80 %    Immature Granulocytes 0.40 0.00 - 0.90 %    Nucleated RBC 0.20 0.00 - 0.20 /100 WBC    Neutrophils (Absolute) 12.08 (H) 1.82 - 7.42 K/uL    Lymphs (Absolute) 2.64 1.00 - 4.80 K/uL "    Monos (Absolute) 0.78 0.00 - 0.85 K/uL    Eos (Absolute) 0.16 0.00 - 0.51 K/uL    Baso (Absolute) 0.16 (H) 0.00 - 0.12 K/uL    Immature Granulocytes (abs) 0.07 0.00 - 0.11 K/uL    NRBC (Absolute) 0.03 K/uL   COMP METABOLIC PANEL   Result Value Ref Range    Sodium 140 135 - 145 mmol/L    Potassium 4.2 3.6 - 5.5 mmol/L    Chloride 107 96 - 112 mmol/L    Co2 18 (L) 20 - 33 mmol/L    Anion Gap 15.0 7.0 - 16.0    Glucose 118 (H) 65 - 99 mg/dL    Bun 23 (H) 8 - 22 mg/dL    Creatinine 0.96 0.50 - 1.40 mg/dL    Calcium 9.1 8.5 - 10.5 mg/dL    Correct Calcium 9.2 8.5 - 10.5 mg/dL    AST(SGOT) 17 12 - 45 U/L    ALT(SGPT) 7 2 - 50 U/L    Alkaline Phosphatase 135 (H) 30 - 99 U/L    Total Bilirubin 0.5 0.1 - 1.5 mg/dL    Albumin 3.9 3.2 - 4.9 g/dL    Total Protein 7.6 6.0 - 8.2 g/dL    Globulin 3.7 (H) 1.9 - 3.5 g/dL    A-G Ratio 1.1 g/dL   PROTHROMBIN TIME   Result Value Ref Range    PT 13.8 12.0 - 14.6 sec    INR 1.05 0.87 - 1.13   APTT   Result Value Ref Range    APTT 29.5 24.7 - 36.0 sec   COD (ADULT)   Result Value Ref Range    ABO Grouping Only O     Rh Grouping Only POS     Antibody Screen-Cod NEG    TROPONIN   Result Value Ref Range    Troponin T 8 6 - 19 ng/L   ESTIMATED GFR   Result Value Ref Range    GFR (CKD-EPI) 58 (A) >60 mL/min/1.73 m 2   EKG (NOW)   Result Value Ref Range    Report       [facility] Emergency Dept.    Test Date:  2024  Pt Name:    JOSE DEL CID                Department: ER  MRN:        [id number]                      Room:        08  Gender:     Female                       Technician: [id number]  :        [id number]                   Requested By:MELISSA PACK  Order #:    [id number]                    Reading MD: MELISSA PACK MD    Measurements  Intervals                                Axis  Rate:       85                           P:          53  WY:         138                          QRS:        27  QRSD:       88                           T:           -19  QT:         401  QTc:        477    Interpretive Statements  Sinus rhythm  Nonspecific T abnormalities, inferior leads  Compared to ECG 04/02/2019 13:32:11  No significant changes  Electronically Signed On 01- 14:36:29 PST by MELISSA PACK MD         RADIOLOGY  I have independently interpreted the diagnostic imaging associated with this visit and am waiting the final reading from the radiologist.   My preliminary interpretation is as follows: X-ray shows no acute infiltrate.  CT of the head shows no acute hemorrhage.      Radiologist interpretation:   DX-CHEST-PORTABLE (1 VIEW)   Final Result      1.  No acute cardiopulmonary abnormality identified.      2.  Elevated right hemidiaphragm, chronic      3.  Postoperative changes at the gastroesophageal junction      CT-CTA NECK WITH & W/O-POST PROCESSING   Final Result      1.  No significant stenosis or occlusion      2.  Mild bilateral internal carotid artery stenoses      3.  Right upper lobe subpleural 5 mm and 2 mm pulmonary nodule      Fleischner Society pulmonary nodule recommendations:   Low Risk: No routine follow-up      High Risk: Optional CT at 12 months      Comments: Use most suspicious nodule as guide to management. Follow-up intervals may vary according to size and risk.      Low Risk - Minimal or absent history of smoking and of other known risk factors.      High Risk - History of smoking or of other known risk factors.      Note: These recommendations do not apply to lung cancer screening, patients with immunosuppression, or patients with known primary cancer.      Fleischner Society 2017 Guidelines for Management of Incidentally Detected Pulmonary Nodules in Adults   1.         CT-CTA HEAD WITH & W/O-POST PROCESS   Final Result         1. No hemodynamically significant narrowing of the major intracranial vessels.      CT-CEREBRAL PERFUSION ANALYSIS   Final Result      1.  Cerebral blood flow less than 30% likely representing  completed infarct = 0 mL.      2.  T Max more than 6 seconds likely representing combination of completed infarct and ischemia = 0 mL.      3.  Mismatched volume likely representing ischemic brain/penumbra = None      4.  Please note that the cerebral perfusion was performed on the limited brain tissue around the basal ganglia region. Infarct/ischemia outside the CT perfusion sections can be missed in this study.      CT-HEAD W/O   Final Result         1. No acute intracranial findings. No evidence of acute intracranial hemorrhage or mass lesion.      2. White matter lucencies most consistent with chronic small vessel ischemic change.                          COURSE & MEDICAL DECISION MAKING    ED COURSE:    ED Observation Status? No, The patient does not qualify for observation status    INTERVENTIONS BY ME:  Medications   ondansetron (Zofran) syringe/vial injection 4 mg (4 mg Intravenous Given 1/1/24 1415)   diphenhydrAMINE (Benadryl) injection 50 mg (50 mg Intravenous Given 1/1/24 1415)   methylPREDNISolone sod succ (SOLU-MEDROL) 125 MG injection 125 mg (125 mg Intravenous Given 1/1/24 1416)   iohexol (OMNIPAQUE) 350 mg/mL (IV) (100 mL Intravenous Given 1/1/24 1420)   amLODIPine (Norvasc) tablet 10 mg (10 mg Oral Given 1/1/24 1624)       1:39 PM - Patient seen and examined at bedside. The patient is an 83 year old female who presents to the Emergency Department via EMS for evaluation of a possible stroke. The patient was brought in from the [facility] where she was with her family. She was last known well at 12:45 PM and then began to experience a headache, dizziness, weakness, and slurred speech. Upon examination, the patient also has left arm and left lower extremity weakness. Discussed plan of care, including performing an EKG, lab work, and imaging. The patient will be medicated with Zofran 4 mg. Ordered for an EKG, Troponin, COD, APTT, Prothrombin Time, CMP, CBC w/ Diff., CT-CTA Neck w/ and w/o, CT-CTA Head w/  and w/o, CT-Cerebral Perfusion Analysis, CT-Head w/o, and DX-Chest to evaluate her symptoms. Patient agrees to the plan of care.     2:00 PM - I was informed by the nursing staff that the patient got hives all over her arms and chest. The patient will be given Soul-Medrol 125 mg and Benadryl 50 mg.     2:21 PM - Spoke with Neurology, who states that the patient is not an TREVA placed candidate.     3:45 PM - The patient was reevaluated at bedside. The patient's family is present and her daughter reports that her speech is significantly improved. The patient will be taken on a walk to see how she feels when ambulating.    4:17 PM - The patient will be treated with Norvasc 10 mg.     4:38 PM - The patient is feeling improved. Discussed discharge instructions and return precautions with the patient and they were cleared for discharge. Patient was given the opportunity to ask any further questions. She is comfortable with discharge at this time.         INITIAL ASSESSMENT, COURSE AND PLAN  Care Narrative: Patient presents emerged part for evaluation.  Initially she presented with possible CVA but with her complaints of headache it is possible for intercerebral bleed.  Patient was taken to the CT scanner and while the patient was in the CT scanner she was starting to have hives and she stated these hives started after she had eaten something about 11:00 so I started the patient with a dose of Solu-Medrol 125 mg as well as Benadryl 50 mg.  CT angiogram was obtained in the above fashion and there was no signs of acute hemorrhage or retrievable clot.  Upon reevaluation the patient's neurologic status is improving to her that her NIH was down to 0.  Upon multiple evaluations she still remains hypertensive at 204/83 and 202/83 so I started the patient on her normal Norvasc 10 mg.  She states that her blood pressure is normally 180s.  At this point it is possible the patient may have had a TIA or it could have been possibly  "secondary to an allergic type reaction.  At this point the patient does not wish to be on any steroids I have recommended that she take 50 mg of Benadryl 3 times a day for her hives.  Patient states that she has had a \"stroke\" in the past but according to the MRI 2019 there was no overt signs of an acute CVA or CVA she does have some microvascular changes.  At this point for maximal therapy on this patient Plavix in addition to her aspirin because she does have hypertension and her clinical findings today her ABCD2 score is 6 which is consistent with starting the patient on Plavix including with aspirin.  At this point I recommended for the patient to follow-up with her primary care physician she does have an appointment on Wednesday for outpatient recheck.  I will put a referral into the neurology stroke clinic as well for further outpatient treatment and care.  At this point she has had an echocardiogram in 2019 which showed no valvular disease.  No signs of atrial fibrillation on today's visit no signs of carotid stenosis so I feel the patient is at maximal therapy for treatment for stroke prevention.  At this point recommend for better hypertension control and to follow-up as above.  We did ambulate the patient she was able to ambulate without any assistance and she is improved so at this point I did discuss the possibility of admitting the patient with the family but since she is improved I do not feel there would be any benefit for admission and they agree the patient would rather go home so at this point the patient will be discharged.     ADDITIONAL PROBLEM LIST  1.  Hypertension    DISPOSITION AND DISCUSSIONS  I have discussed management of the patient with the following physicians/ JAROD's/ ancillary staff:  Neurology     Escalation of care considered, and ultimately not performed:acute inpatient care management, however at this time, the patient is most appropriate for outpatient management    Decision " tools and prescription drugs considered including, but not limited to: NIH Stroke Scale  2 .    The patient will return for new or worsening symptoms and is stable at the time of discharge.    DISPOSITION:  Patient will be discharged home in stable condition.    FOLLOW UP:  William Coleman M.D.  [address] [place] [id number]  [phone]    Schedule an appointment as soon as possible for a visit   wed for recheck    OUTPATIENT MEDICATIONS:  Discharge Medication List as of 1/1/2024  4:30 PM        START taking these medications    Details   clopidogrel (PLAVIX) 75 MG Tab Take 1 Tablet by mouth every day., Disp-30 Tablet, R-0, Normal            FINAL DIAGNOSIS  1. TIA (transient ischemic attack)    2. Weakness    3. Primary hypertension      Michelle SIDHU (Addi), am scribing for, and in the presence of, Francisco Martinez M.D..    Electronically signed by: Michelle Vela (Addi), 1/1/2024    IFrancisco M.D. personally performed the services described in this documentation, as scribed by Michelle Vela in my presence, and it is both accurate and complete.     Electronically signed by: Francisco Martinez M.D.,2:59 PM 01/01/24

## 2024-01-02 NOTE — ED NOTES
Pt ambulatory around red pod independently with steady gait.   Pt medicated per MAR for HTN. Educated on checking BP at home prior to taking any at home BP medications. Pt verbally agreed and repeated instructions- family at bedside for education as well.

## 2024-01-02 NOTE — ED NOTES
Pt discharged to lobby with steady gait assisted by family. GCS 15. IV discontinued and gauze placed, pt in possession of belongings. Pt provided discharge education and information pertaining to medications and follow up appointments. Pt received copy of discharge instructions and verbalized understanding.     Vitals:    01/01/24 1601   BP: (!) 216/88   Pulse: 72   Resp: 16   Temp: 36.8 °C (98.2 °F)   SpO2: 93%

## 2024-01-26 ENCOUNTER — TELEPHONE (OUTPATIENT)
Dept: HEALTH INFORMATION MANAGEMENT | Facility: OTHER | Age: 84
End: 2024-01-26
Payer: MEDICARE

## 2024-01-31 ENCOUNTER — TELEPHONE (OUTPATIENT)
Dept: HEALTH INFORMATION MANAGEMENT | Facility: OTHER | Age: 84
End: 2024-01-31
Payer: MEDICARE

## 2024-03-13 ENCOUNTER — HOSPITAL ENCOUNTER (OUTPATIENT)
Dept: RADIOLOGY | Facility: MEDICAL CENTER | Age: 84
End: 2024-03-13
Attending: FAMILY MEDICINE
Payer: MEDICARE

## 2024-03-13 ENCOUNTER — HOSPITAL ENCOUNTER (OUTPATIENT)
Dept: RADIOLOGY | Facility: MEDICAL CENTER | Age: 84
End: 2024-03-13
Payer: MEDICARE

## 2024-03-13 DIAGNOSIS — G43.109 MIGRAINE WITH AURA AND WITHOUT STATUS MIGRAINOSUS, NOT INTRACTABLE: ICD-10-CM

## 2024-03-13 DIAGNOSIS — Z86.73 HISTORY OF STROKE WITHOUT RESIDUAL DEFICITS: ICD-10-CM

## 2024-03-13 DIAGNOSIS — M54.2 CERVICALGIA: ICD-10-CM

## 2024-03-13 PROCEDURE — 70551 MRI BRAIN STEM W/O DYE: CPT

## 2024-03-13 PROCEDURE — 72040 X-RAY EXAM NECK SPINE 2-3 VW: CPT

## 2024-06-14 ENCOUNTER — HOSPITAL ENCOUNTER (OUTPATIENT)
Dept: LAB | Facility: MEDICAL CENTER | Age: 84
End: 2024-06-14
Attending: FAMILY MEDICINE
Payer: MEDICARE

## 2024-06-14 ENCOUNTER — HOSPITAL ENCOUNTER (OUTPATIENT)
Dept: LAB | Facility: MEDICAL CENTER | Age: 84
End: 2024-06-14
Attending: ANESTHESIOLOGY
Payer: MEDICARE

## 2024-06-14 LAB
25(OH)D3 SERPL-MCNC: 36 NG/ML (ref 30–100)
ALBUMIN SERPL BCP-MCNC: 4 G/DL (ref 3.2–4.9)
ALBUMIN/GLOB SERPL: 1.1 G/DL
ALP SERPL-CCNC: 113 U/L (ref 30–99)
ALT SERPL-CCNC: 13 U/L (ref 2–50)
ANION GAP SERPL CALC-SCNC: 13 MMOL/L (ref 7–16)
APPEARANCE UR: CLEAR
AST SERPL-CCNC: 20 U/L (ref 12–45)
BACTERIA #/AREA URNS HPF: ABNORMAL /HPF
BILIRUB SERPL-MCNC: 0.4 MG/DL (ref 0.1–1.5)
BILIRUB UR QL STRIP.AUTO: NEGATIVE
BUN SERPL-MCNC: 15 MG/DL (ref 8–22)
CALCIUM ALBUM COR SERPL-MCNC: 9.4 MG/DL (ref 8.5–10.5)
CALCIUM SERPL-MCNC: 9.4 MG/DL (ref 8.5–10.5)
CHLORIDE SERPL-SCNC: 105 MMOL/L (ref 96–112)
CO2 SERPL-SCNC: 23 MMOL/L (ref 20–33)
COLOR UR: YELLOW
CREAT SERPL-MCNC: 0.8 MG/DL (ref 0.5–1.4)
EPI CELLS #/AREA URNS HPF: ABNORMAL /HPF
GFR SERPLBLD CREATININE-BSD FMLA CKD-EPI: 72 ML/MIN/1.73 M 2
GLOBULIN SER CALC-MCNC: 3.7 G/DL (ref 1.9–3.5)
GLUCOSE SERPL-MCNC: 109 MG/DL (ref 65–99)
GLUCOSE UR STRIP.AUTO-MCNC: NEGATIVE MG/DL
HYALINE CASTS #/AREA URNS LPF: ABNORMAL /LPF
KETONES UR STRIP.AUTO-MCNC: NEGATIVE MG/DL
LEUKOCYTE ESTERASE UR QL STRIP.AUTO: ABNORMAL
MICRO URNS: ABNORMAL
MUCOUS THREADS #/AREA URNS HPF: ABNORMAL /HPF
NITRITE UR QL STRIP.AUTO: NEGATIVE
PH UR STRIP.AUTO: 5.5 [PH] (ref 5–8)
POTASSIUM SERPL-SCNC: 4.2 MMOL/L (ref 3.6–5.5)
PROT SERPL-MCNC: 7.7 G/DL (ref 6–8.2)
PROT UR QL STRIP: NEGATIVE MG/DL
RBC # URNS HPF: ABNORMAL /HPF
RBC UR QL AUTO: NEGATIVE
SODIUM SERPL-SCNC: 141 MMOL/L (ref 135–145)
SP GR UR STRIP.AUTO: 1.02
UROBILINOGEN UR STRIP.AUTO-MCNC: 0.2 MG/DL
WBC #/AREA URNS HPF: ABNORMAL /HPF

## 2024-06-14 PROCEDURE — 80053 COMPREHEN METABOLIC PANEL: CPT

## 2024-06-14 PROCEDURE — 82306 VITAMIN D 25 HYDROXY: CPT

## 2024-06-14 PROCEDURE — 36415 COLL VENOUS BLD VENIPUNCTURE: CPT

## 2024-06-14 PROCEDURE — 81001 URINALYSIS AUTO W/SCOPE: CPT

## 2024-07-27 ENCOUNTER — APPOINTMENT (OUTPATIENT)
Dept: RADIOLOGY | Facility: MEDICAL CENTER | Age: 84
End: 2024-07-27
Attending: EMERGENCY MEDICINE
Payer: MEDICARE

## 2024-07-27 ENCOUNTER — HOSPITAL ENCOUNTER (INPATIENT)
Facility: MEDICAL CENTER | Age: 84
LOS: 1 days | End: 2024-07-29
Attending: EMERGENCY MEDICINE | Admitting: STUDENT IN AN ORGANIZED HEALTH CARE EDUCATION/TRAINING PROGRAM
Payer: MEDICARE

## 2024-07-27 DIAGNOSIS — I10 PRIMARY HYPERTENSION: ICD-10-CM

## 2024-07-27 DIAGNOSIS — N39.0 ACUTE UTI: ICD-10-CM

## 2024-07-27 DIAGNOSIS — D72.829 LEUKOCYTOSIS, UNSPECIFIED TYPE: ICD-10-CM

## 2024-07-27 DIAGNOSIS — R00.0 SINUS TACHYCARDIA: ICD-10-CM

## 2024-07-27 DIAGNOSIS — R07.9 CHEST PAIN, UNSPECIFIED TYPE: ICD-10-CM

## 2024-07-27 DIAGNOSIS — R10.9 INTRACTABLE ABDOMINAL PAIN: ICD-10-CM

## 2024-07-27 DIAGNOSIS — R11.2 INTRACTABLE NAUSEA AND VOMITING: Primary | ICD-10-CM

## 2024-07-27 DIAGNOSIS — R11.2 NAUSEA AND VOMITING, UNSPECIFIED VOMITING TYPE: ICD-10-CM

## 2024-07-27 DIAGNOSIS — R06.02 SHORTNESS OF BREATH: ICD-10-CM

## 2024-07-27 LAB
ALBUMIN SERPL BCP-MCNC: 3.7 G/DL (ref 3.2–4.9)
ALBUMIN/GLOB SERPL: 1.1 G/DL
ALP SERPL-CCNC: 100 U/L (ref 30–99)
ALT SERPL-CCNC: 16 U/L (ref 2–50)
ANION GAP SERPL CALC-SCNC: 11 MMOL/L (ref 7–16)
APPEARANCE UR: ABNORMAL
AST SERPL-CCNC: 23 U/L (ref 12–45)
BACTERIA #/AREA URNS HPF: NEGATIVE /HPF
BACTERIA BLD CULT: NORMAL
BACTERIA BLD CULT: NORMAL
BASOPHILS # BLD AUTO: 0.3 % (ref 0–1.8)
BASOPHILS # BLD: 0.06 K/UL (ref 0–0.12)
BILIRUB SERPL-MCNC: 0.6 MG/DL (ref 0.1–1.5)
BILIRUB UR QL STRIP.AUTO: ABNORMAL
BUN SERPL-MCNC: 13 MG/DL (ref 8–22)
CALCIUM ALBUM COR SERPL-MCNC: 9.5 MG/DL (ref 8.5–10.5)
CALCIUM SERPL-MCNC: 9.3 MG/DL (ref 8.5–10.5)
CHLORIDE SERPL-SCNC: 103 MMOL/L (ref 96–112)
CO2 SERPL-SCNC: 23 MMOL/L (ref 20–33)
COLOR UR: ABNORMAL
CREAT SERPL-MCNC: 0.77 MG/DL (ref 0.5–1.4)
EKG IMPRESSION: NORMAL
EOSINOPHIL # BLD AUTO: 0.04 K/UL (ref 0–0.51)
EOSINOPHIL NFR BLD: 0.2 % (ref 0–6.9)
EPI CELLS #/AREA URNS HPF: ABNORMAL /HPF
ERYTHROCYTE [DISTWIDTH] IN BLOOD BY AUTOMATED COUNT: 43.6 FL (ref 35.9–50)
FLUAV RNA SPEC QL NAA+PROBE: NEGATIVE
FLUBV RNA SPEC QL NAA+PROBE: NEGATIVE
GFR SERPLBLD CREATININE-BSD FMLA CKD-EPI: 75 ML/MIN/1.73 M 2
GLOBULIN SER CALC-MCNC: 3.5 G/DL (ref 1.9–3.5)
GLUCOSE SERPL-MCNC: 145 MG/DL (ref 65–99)
GLUCOSE UR STRIP.AUTO-MCNC: NEGATIVE MG/DL
HCT VFR BLD AUTO: 41.9 % (ref 37–47)
HGB BLD-MCNC: 13.3 G/DL (ref 12–16)
HYALINE CASTS #/AREA URNS LPF: ABNORMAL /LPF
IMM GRANULOCYTES # BLD AUTO: 0.31 K/UL (ref 0–0.11)
IMM GRANULOCYTES NFR BLD AUTO: 1.5 % (ref 0–0.9)
KETONES UR STRIP.AUTO-MCNC: ABNORMAL MG/DL
LACTATE SERPL-SCNC: 1.3 MMOL/L (ref 0.5–2)
LEUKOCYTE ESTERASE UR QL STRIP.AUTO: ABNORMAL
LYMPHOCYTES # BLD AUTO: 0.5 K/UL (ref 1–4.8)
LYMPHOCYTES NFR BLD: 2.5 % (ref 22–41)
MCH RBC QN AUTO: 28 PG (ref 27–33)
MCHC RBC AUTO-ENTMCNC: 31.7 G/DL (ref 32.2–35.5)
MCV RBC AUTO: 88.2 FL (ref 81.4–97.8)
MICRO URNS: ABNORMAL
MONOCYTES # BLD AUTO: 0.63 K/UL (ref 0–0.85)
MONOCYTES NFR BLD AUTO: 3.1 % (ref 0–13.4)
NEUTROPHILS # BLD AUTO: 18.58 K/UL (ref 1.82–7.42)
NEUTROPHILS NFR BLD: 92.4 % (ref 44–72)
NITRITE UR QL STRIP.AUTO: NEGATIVE
NRBC # BLD AUTO: 0 K/UL
NRBC BLD-RTO: 0 /100 WBC (ref 0–0.2)
NT-PROBNP SERPL IA-MCNC: 360 PG/ML (ref 0–125)
PH UR STRIP.AUTO: 5 [PH] (ref 5–8)
PLATELET # BLD AUTO: 271 K/UL (ref 164–446)
PMV BLD AUTO: 9.5 FL (ref 9–12.9)
POTASSIUM SERPL-SCNC: 4.5 MMOL/L (ref 3.6–5.5)
PROT SERPL-MCNC: 7.2 G/DL (ref 6–8.2)
PROT UR QL STRIP: 30 MG/DL
RBC # BLD AUTO: 4.75 M/UL (ref 4.2–5.4)
RBC # URNS HPF: ABNORMAL /HPF
RBC UR QL AUTO: NEGATIVE
RSV RNA SPEC QL NAA+PROBE: NEGATIVE
SARS-COV-2 RNA RESP QL NAA+PROBE: NOTDETECTED
SIGNIFICANT IND 70042: NORMAL
SIGNIFICANT IND 70042: NORMAL
SITE SITE: NORMAL
SITE SITE: NORMAL
SODIUM SERPL-SCNC: 137 MMOL/L (ref 135–145)
SOURCE SOURCE: NORMAL
SOURCE SOURCE: NORMAL
SP GR UR STRIP.AUTO: 1.02
TROPONIN T SERPL-MCNC: 9 NG/L (ref 6–19)
UROBILINOGEN UR STRIP.AUTO-MCNC: 1 MG/DL
WBC # BLD AUTO: 20.1 K/UL (ref 4.8–10.8)
WBC #/AREA URNS HPF: ABNORMAL /HPF

## 2024-07-27 PROCEDURE — 96375 TX/PRO/DX INJ NEW DRUG ADDON: CPT

## 2024-07-27 PROCEDURE — 83605 ASSAY OF LACTIC ACID: CPT

## 2024-07-27 PROCEDURE — 96376 TX/PRO/DX INJ SAME DRUG ADON: CPT

## 2024-07-27 PROCEDURE — 80053 COMPREHEN METABOLIC PANEL: CPT

## 2024-07-27 PROCEDURE — 93005 ELECTROCARDIOGRAM TRACING: CPT | Performed by: EMERGENCY MEDICINE

## 2024-07-27 PROCEDURE — 96365 THER/PROPH/DIAG IV INF INIT: CPT

## 2024-07-27 PROCEDURE — 700111 HCHG RX REV CODE 636 W/ 250 OVERRIDE (IP): Mod: JZ | Performed by: STUDENT IN AN ORGANIZED HEALTH CARE EDUCATION/TRAINING PROGRAM

## 2024-07-27 PROCEDURE — 700117 HCHG RX CONTRAST REV CODE 255: Performed by: EMERGENCY MEDICINE

## 2024-07-27 PROCEDURE — RXMED WILLOW AMBULATORY MEDICATION CHARGE: Performed by: EMERGENCY MEDICINE

## 2024-07-27 PROCEDURE — 96374 THER/PROPH/DIAG INJ IV PUSH: CPT

## 2024-07-27 PROCEDURE — 36415 COLL VENOUS BLD VENIPUNCTURE: CPT

## 2024-07-27 PROCEDURE — 87040 BLOOD CULTURE FOR BACTERIA: CPT | Mod: 91

## 2024-07-27 PROCEDURE — G0378 HOSPITAL OBSERVATION PER HR: HCPCS

## 2024-07-27 PROCEDURE — 87086 URINE CULTURE/COLONY COUNT: CPT

## 2024-07-27 PROCEDURE — 87491 CHLMYD TRACH DNA AMP PROBE: CPT

## 2024-07-27 PROCEDURE — 87591 N.GONORRHOEAE DNA AMP PROB: CPT

## 2024-07-27 PROCEDURE — 85025 COMPLETE CBC W/AUTO DIFF WBC: CPT

## 2024-07-27 PROCEDURE — 74177 CT ABD & PELVIS W/CONTRAST: CPT

## 2024-07-27 PROCEDURE — 0241U HCHG SARS-COV-2 COVID-19 NFCT DS RESP RNA 4 TRGT ED POC: CPT

## 2024-07-27 PROCEDURE — 83880 ASSAY OF NATRIURETIC PEPTIDE: CPT

## 2024-07-27 PROCEDURE — 81001 URINALYSIS AUTO W/SCOPE: CPT

## 2024-07-27 PROCEDURE — 71045 X-RAY EXAM CHEST 1 VIEW: CPT

## 2024-07-27 PROCEDURE — 700105 HCHG RX REV CODE 258: Performed by: EMERGENCY MEDICINE

## 2024-07-27 PROCEDURE — 93005 ELECTROCARDIOGRAM TRACING: CPT

## 2024-07-27 PROCEDURE — 99285 EMERGENCY DEPT VISIT HI MDM: CPT

## 2024-07-27 PROCEDURE — 700105 HCHG RX REV CODE 258: Performed by: STUDENT IN AN ORGANIZED HEALTH CARE EDUCATION/TRAINING PROGRAM

## 2024-07-27 PROCEDURE — 84484 ASSAY OF TROPONIN QUANT: CPT

## 2024-07-27 PROCEDURE — 700111 HCHG RX REV CODE 636 W/ 250 OVERRIDE (IP): Mod: JZ | Performed by: EMERGENCY MEDICINE

## 2024-07-27 PROCEDURE — 99223 1ST HOSP IP/OBS HIGH 75: CPT | Performed by: STUDENT IN AN ORGANIZED HEALTH CARE EDUCATION/TRAINING PROGRAM

## 2024-07-27 RX ORDER — CEFTRIAXONE 1 G/1
1000 INJECTION, POWDER, FOR SOLUTION INTRAMUSCULAR; INTRAVENOUS ONCE
Status: COMPLETED | OUTPATIENT
Start: 2024-07-27 | End: 2024-07-27

## 2024-07-27 RX ORDER — ENOXAPARIN SODIUM 100 MG/ML
40 INJECTION SUBCUTANEOUS DAILY
Status: DISCONTINUED | OUTPATIENT
Start: 2024-07-28 | End: 2024-07-29 | Stop reason: HOSPADM

## 2024-07-27 RX ORDER — LISINOPRIL 10 MG/1
10 TABLET ORAL DAILY
Status: DISCONTINUED | OUTPATIENT
Start: 2024-07-28 | End: 2024-07-29 | Stop reason: HOSPADM

## 2024-07-27 RX ORDER — CLOPIDOGREL BISULFATE 75 MG/1
75 TABLET ORAL DAILY
Status: DISCONTINUED | OUTPATIENT
Start: 2024-07-28 | End: 2024-07-28

## 2024-07-27 RX ORDER — HYDROMORPHONE HYDROCHLORIDE 1 MG/ML
1 INJECTION, SOLUTION INTRAMUSCULAR; INTRAVENOUS; SUBCUTANEOUS ONCE
Status: COMPLETED | OUTPATIENT
Start: 2024-07-27 | End: 2024-07-27

## 2024-07-27 RX ORDER — ACETAMINOPHEN 325 MG/1
650 TABLET ORAL EVERY 6 HOURS PRN
Status: DISCONTINUED | OUTPATIENT
Start: 2024-07-27 | End: 2024-07-28

## 2024-07-27 RX ORDER — ONDANSETRON 4 MG/1
4 TABLET, ORALLY DISINTEGRATING ORAL EVERY 4 HOURS PRN
Status: DISCONTINUED | OUTPATIENT
Start: 2024-07-27 | End: 2024-07-29 | Stop reason: HOSPADM

## 2024-07-27 RX ORDER — ONDANSETRON 4 MG/1
4 TABLET, ORALLY DISINTEGRATING ORAL EVERY 6 HOURS PRN
Qty: 10 TABLET | Refills: 0 | Status: SHIPPED | OUTPATIENT
Start: 2024-07-27

## 2024-07-27 RX ORDER — MORPHINE SULFATE 4 MG/ML
4 INJECTION INTRAVENOUS ONCE
Status: COMPLETED | OUTPATIENT
Start: 2024-07-27 | End: 2024-07-27

## 2024-07-27 RX ORDER — SODIUM CHLORIDE 9 MG/ML
1000 INJECTION, SOLUTION INTRAVENOUS ONCE
Status: COMPLETED | OUTPATIENT
Start: 2024-07-27 | End: 2024-07-27

## 2024-07-27 RX ORDER — SODIUM CHLORIDE 9 MG/ML
1000 INJECTION, SOLUTION INTRAVENOUS ONCE
Status: DISCONTINUED | OUTPATIENT
Start: 2024-07-27 | End: 2024-07-27

## 2024-07-27 RX ORDER — OXYCODONE HYDROCHLORIDE 5 MG/1
5 TABLET ORAL EVERY 4 HOURS PRN
Status: DISCONTINUED | OUTPATIENT
Start: 2024-07-27 | End: 2024-07-28

## 2024-07-27 RX ORDER — CEPHALEXIN 500 MG/1
500 CAPSULE ORAL 4 TIMES DAILY
Qty: 20 CAPSULE | Refills: 0 | Status: ACTIVE | OUTPATIENT
Start: 2024-07-27 | End: 2024-07-29

## 2024-07-27 RX ORDER — ONDANSETRON 2 MG/ML
4 INJECTION INTRAMUSCULAR; INTRAVENOUS ONCE
Status: COMPLETED | OUTPATIENT
Start: 2024-07-27 | End: 2024-07-27

## 2024-07-27 RX ORDER — POLYETHYLENE GLYCOL 3350 17 G/17G
17 POWDER, FOR SOLUTION ORAL DAILY
COMMUNITY

## 2024-07-27 RX ORDER — PROCHLORPERAZINE EDISYLATE 5 MG/ML
10 INJECTION INTRAMUSCULAR; INTRAVENOUS EVERY 6 HOURS PRN
Status: DISCONTINUED | OUTPATIENT
Start: 2024-07-27 | End: 2024-07-29 | Stop reason: HOSPADM

## 2024-07-27 RX ORDER — MORPHINE SULFATE 4 MG/ML
4 INJECTION INTRAVENOUS EVERY 4 HOURS PRN
Status: DISCONTINUED | OUTPATIENT
Start: 2024-07-27 | End: 2024-07-28

## 2024-07-27 RX ORDER — SODIUM CHLORIDE 9 MG/ML
INJECTION, SOLUTION INTRAVENOUS CONTINUOUS
Status: ACTIVE | OUTPATIENT
Start: 2024-07-27 | End: 2024-07-28

## 2024-07-27 RX ORDER — ASPIRIN 81 MG/1
81 TABLET ORAL DAILY
Status: DISCONTINUED | OUTPATIENT
Start: 2024-07-28 | End: 2024-07-28

## 2024-07-27 RX ORDER — METRONIDAZOLE 500 MG/100ML
500 INJECTION, SOLUTION INTRAVENOUS EVERY 12 HOURS
Status: DISCONTINUED | OUTPATIENT
Start: 2024-07-27 | End: 2024-07-29 | Stop reason: HOSPADM

## 2024-07-27 RX ORDER — ONDANSETRON 2 MG/ML
4 INJECTION INTRAMUSCULAR; INTRAVENOUS EVERY 4 HOURS PRN
Status: DISCONTINUED | OUTPATIENT
Start: 2024-07-27 | End: 2024-07-29 | Stop reason: HOSPADM

## 2024-07-27 RX ADMIN — SODIUM CHLORIDE 1000 ML: 9 INJECTION, SOLUTION INTRAVENOUS at 18:29

## 2024-07-27 RX ADMIN — MORPHINE SULFATE 4 MG: 4 INJECTION, SOLUTION INTRAMUSCULAR; INTRAVENOUS at 18:27

## 2024-07-27 RX ADMIN — HYDROMORPHONE HYDROCHLORIDE 1 MG: 1 INJECTION, SOLUTION INTRAMUSCULAR; INTRAVENOUS; SUBCUTANEOUS at 20:08

## 2024-07-27 RX ADMIN — MORPHINE SULFATE 4 MG: 4 INJECTION, SOLUTION INTRAMUSCULAR; INTRAVENOUS at 22:23

## 2024-07-27 RX ADMIN — FAMOTIDINE 40 MG: 10 INJECTION, SOLUTION INTRAVENOUS at 22:08

## 2024-07-27 RX ADMIN — METRONIDAZOLE 500 MG: 5 INJECTION, SOLUTION INTRAVENOUS at 22:19

## 2024-07-27 RX ADMIN — CEFTRIAXONE SODIUM 1000 MG: 1 INJECTION, POWDER, FOR SOLUTION INTRAMUSCULAR; INTRAVENOUS at 19:03

## 2024-07-27 RX ADMIN — PROCHLORPERAZINE EDISYLATE 10 MG: 5 INJECTION INTRAMUSCULAR; INTRAVENOUS at 23:18

## 2024-07-27 RX ADMIN — SODIUM CHLORIDE: 9 INJECTION, SOLUTION INTRAVENOUS at 22:16

## 2024-07-27 RX ADMIN — ONDANSETRON 4 MG: 2 INJECTION INTRAMUSCULAR; INTRAVENOUS at 18:26

## 2024-07-27 RX ADMIN — IOHEXOL 100 ML: 350 INJECTION, SOLUTION INTRAVENOUS at 19:15

## 2024-07-27 RX ADMIN — ONDANSETRON 4 MG: 2 INJECTION INTRAMUSCULAR; INTRAVENOUS at 19:52

## 2024-07-27 SDOH — ECONOMIC STABILITY: TRANSPORTATION INSECURITY
IN THE PAST 12 MONTHS, HAS LACK OF RELIABLE TRANSPORTATION KEPT YOU FROM MEDICAL APPOINTMENTS, MEETINGS, WORK OR FROM GETTING THINGS NEEDED FOR DAILY LIVING?: NO

## 2024-07-27 SDOH — ECONOMIC STABILITY: TRANSPORTATION INSECURITY
IN THE PAST 12 MONTHS, HAS THE LACK OF TRANSPORTATION KEPT YOU FROM MEDICAL APPOINTMENTS OR FROM GETTING MEDICATIONS?: NO

## 2024-07-27 ASSESSMENT — PAIN DESCRIPTION - PAIN TYPE
TYPE: ACUTE PAIN
TYPE: ACUTE PAIN

## 2024-07-27 ASSESSMENT — ENCOUNTER SYMPTOMS
BACK PAIN: 0
DOUBLE VISION: 0
INSOMNIA: 0
HALLUCINATIONS: 0
SINUS PAIN: 0
NECK PAIN: 0
PHOTOPHOBIA: 0
BLOOD IN STOOL: 0
DIZZINESS: 0
CHILLS: 0
NAUSEA: 1
FEVER: 0
PALPITATIONS: 0
NERVOUS/ANXIOUS: 0
ABDOMINAL PAIN: 1
SHORTNESS OF BREATH: 0
COUGH: 0
DIARRHEA: 0
CONSTIPATION: 0
SPUTUM PRODUCTION: 0
HEADACHES: 0
VOMITING: 1
ORTHOPNEA: 0
EYE PAIN: 0
EYE DISCHARGE: 0
HEMOPTYSIS: 0

## 2024-07-27 ASSESSMENT — SOCIAL DETERMINANTS OF HEALTH (SDOH)
WITHIN THE LAST YEAR, HAVE TO BEEN RAPED OR FORCED TO HAVE ANY KIND OF SEXUAL ACTIVITY BY YOUR PARTNER OR EX-PARTNER?: NO
IN THE PAST 12 MONTHS, HAS THE ELECTRIC, GAS, OIL, OR WATER COMPANY THREATENED TO SHUT OFF SERVICE IN YOUR HOME?: NO
WITHIN THE PAST 12 MONTHS, YOU WORRIED THAT YOUR FOOD WOULD RUN OUT BEFORE YOU GOT THE MONEY TO BUY MORE: NEVER TRUE
WITHIN THE LAST YEAR, HAVE YOU BEEN AFRAID OF YOUR PARTNER OR EX-PARTNER?: NO
WITHIN THE PAST 12 MONTHS, THE FOOD YOU BOUGHT JUST DIDN'T LAST AND YOU DIDN'T HAVE MONEY TO GET MORE: NEVER TRUE
WITHIN THE LAST YEAR, HAVE YOU BEEN HUMILIATED OR EMOTIONALLY ABUSED IN OTHER WAYS BY YOUR PARTNER OR EX-PARTNER?: NO
WITHIN THE LAST YEAR, HAVE YOU BEEN KICKED, HIT, SLAPPED, OR OTHERWISE PHYSICALLY HURT BY YOUR PARTNER OR EX-PARTNER?: NO

## 2024-07-27 ASSESSMENT — LIFESTYLE VARIABLES
CONSUMPTION TOTAL: INCOMPLETE
AVERAGE NUMBER OF DAYS PER WEEK YOU HAVE A DRINK CONTAINING ALCOHOL: 0
EVER HAD A DRINK FIRST THING IN THE MORNING TO STEADY YOUR NERVES TO GET RID OF A HANGOVER: NO
EVER FELT BAD OR GUILTY ABOUT YOUR DRINKING: NO
TOTAL SCORE: 0
DOES PATIENT WANT TO STOP DRINKING: NO
ON A TYPICAL DAY WHEN YOU DRINK ALCOHOL HOW MANY DRINKS DO YOU HAVE: 0
TOTAL SCORE: 0
ALCOHOL_USE: YES
TOTAL SCORE: 0
HAVE PEOPLE ANNOYED YOU BY CRITICIZING YOUR DRINKING: NO
AVERAGE NUMBER OF DAYS PER WEEK YOU HAVE A DRINK CONTAINING ALCOHOL: 2
ON A TYPICAL DAY WHEN YOU DRINK ALCOHOL HOW MANY DRINKS DO YOU HAVE: 1
DOES PATIENT WANT TO STOP DRINKING: NO
SUBSTANCE_ABUSE: 0
CONSUMPTION TOTAL: NEGATIVE
HOW MANY TIMES IN THE PAST YEAR HAVE YOU HAD 5 OR MORE DRINKS IN A DAY: 0
EVER FELT BAD OR GUILTY ABOUT YOUR DRINKING: NO
HAVE PEOPLE ANNOYED YOU BY CRITICIZING YOUR DRINKING: NO
ALCOHOL_USE: NO
HAVE YOU EVER FELT YOU SHOULD CUT DOWN ON YOUR DRINKING: NO
HOW MANY TIMES IN THE PAST YEAR HAVE YOU HAD 5 OR MORE DRINKS IN A DAY: 0
HAVE YOU EVER FELT YOU SHOULD CUT DOWN ON YOUR DRINKING: NO

## 2024-07-27 ASSESSMENT — FIBROSIS 4 INDEX
FIB4 SCORE: 1.8
FIB4 SCORE: 1.62

## 2024-07-27 ASSESSMENT — PATIENT HEALTH QUESTIONNAIRE - PHQ9
1. LITTLE INTEREST OR PLEASURE IN DOING THINGS: NOT AT ALL
2. FEELING DOWN, DEPRESSED, IRRITABLE, OR HOPELESS: NOT AT ALL
SUM OF ALL RESPONSES TO PHQ9 QUESTIONS 1 AND 2: 0

## 2024-07-27 NOTE — ED TRIAGE NOTES
Vitals:    07/27/24 1547   BP: (!) 127/96   Pulse: (!) 102   Resp: 16   Temp: 36.4 °C (97.5 °F)   SpO2: 94%     Chief Complaint   Patient presents with    N/V    Shortness of Breath    Chest Pain     Pt on Thursday was dx with a UTI and prescribed macrobid, she has a lot of allergies and was told to stop taking the macrobid if she starting feeling bad, she started having SOB, chest pain, and body aches so she stopped taking it. She only took one dose.     Pt is BIB EMS, wheeled to and from triage. She is alert and oriented x 4.

## 2024-07-28 ENCOUNTER — APPOINTMENT (OUTPATIENT)
Dept: RADIOLOGY | Facility: MEDICAL CENTER | Age: 84
End: 2024-07-28
Attending: STUDENT IN AN ORGANIZED HEALTH CARE EDUCATION/TRAINING PROGRAM
Payer: MEDICARE

## 2024-07-28 PROBLEM — R11.2 INTRACTABLE NAUSEA AND VOMITING: Status: ACTIVE | Noted: 2024-07-28

## 2024-07-28 PROBLEM — K72.00 ACUTE LIVER FAILURE WITHOUT HEPATIC COMA: Status: ACTIVE | Noted: 2024-07-28

## 2024-07-28 LAB
ALBUMIN SERPL BCP-MCNC: 3.4 G/DL (ref 3.2–4.9)
ALBUMIN/GLOB SERPL: 1.3 G/DL
ALP SERPL-CCNC: 217 U/L (ref 30–99)
ALT SERPL-CCNC: 401 U/L (ref 2–50)
ANION GAP SERPL CALC-SCNC: 13 MMOL/L (ref 7–16)
AST SERPL-CCNC: 666 U/L (ref 12–45)
BILIRUB SERPL-MCNC: 1.9 MG/DL (ref 0.1–1.5)
BUN SERPL-MCNC: 13 MG/DL (ref 8–22)
C TRACH DNA SPEC QL NAA+PROBE: NEGATIVE
CALCIUM ALBUM COR SERPL-MCNC: 8.9 MG/DL (ref 8.5–10.5)
CALCIUM SERPL-MCNC: 8.4 MG/DL (ref 8.5–10.5)
CHLORIDE SERPL-SCNC: 104 MMOL/L (ref 96–112)
CO2 SERPL-SCNC: 20 MMOL/L (ref 20–33)
CREAT SERPL-MCNC: 0.65 MG/DL (ref 0.5–1.4)
ERYTHROCYTE [DISTWIDTH] IN BLOOD BY AUTOMATED COUNT: 44.3 FL (ref 35.9–50)
GFR SERPLBLD CREATININE-BSD FMLA CKD-EPI: 86 ML/MIN/1.73 M 2
GLOBULIN SER CALC-MCNC: 2.6 G/DL (ref 1.9–3.5)
GLUCOSE SERPL-MCNC: 147 MG/DL (ref 65–99)
HAV IGM SERPL QL IA: NORMAL
HBV CORE IGM SER QL: NORMAL
HBV SURFACE AG SER QL: NORMAL
HCT VFR BLD AUTO: 39.7 % (ref 37–47)
HCV AB SER QL: NORMAL
HGB BLD-MCNC: 12.9 G/DL (ref 12–16)
MCH RBC QN AUTO: 28.7 PG (ref 27–33)
MCHC RBC AUTO-ENTMCNC: 32.5 G/DL (ref 32.2–35.5)
MCV RBC AUTO: 88.2 FL (ref 81.4–97.8)
N GONORRHOEA DNA SPEC QL NAA+PROBE: NEGATIVE
PLATELET # BLD AUTO: 202 K/UL (ref 164–446)
PMV BLD AUTO: 10.6 FL (ref 9–12.9)
POTASSIUM SERPL-SCNC: 4.5 MMOL/L (ref 3.6–5.5)
PROT SERPL-MCNC: 6 G/DL (ref 6–8.2)
RBC # BLD AUTO: 4.5 M/UL (ref 4.2–5.4)
SODIUM SERPL-SCNC: 137 MMOL/L (ref 135–145)
SPECIMEN SOURCE: NORMAL
WBC # BLD AUTO: 13.3 K/UL (ref 4.8–10.8)

## 2024-07-28 PROCEDURE — 770006 HCHG ROOM/CARE - MED/SURG/GYN SEMI*

## 2024-07-28 PROCEDURE — 96376 TX/PRO/DX INJ SAME DRUG ADON: CPT

## 2024-07-28 PROCEDURE — 700111 HCHG RX REV CODE 636 W/ 250 OVERRIDE (IP): Mod: JZ

## 2024-07-28 PROCEDURE — 700102 HCHG RX REV CODE 250 W/ 637 OVERRIDE(OP): Performed by: STUDENT IN AN ORGANIZED HEALTH CARE EDUCATION/TRAINING PROGRAM

## 2024-07-28 PROCEDURE — A9270 NON-COVERED ITEM OR SERVICE: HCPCS | Performed by: STUDENT IN AN ORGANIZED HEALTH CARE EDUCATION/TRAINING PROGRAM

## 2024-07-28 PROCEDURE — 96375 TX/PRO/DX INJ NEW DRUG ADDON: CPT

## 2024-07-28 PROCEDURE — 76705 ECHO EXAM OF ABDOMEN: CPT

## 2024-07-28 PROCEDURE — 99232 SBSQ HOSP IP/OBS MODERATE 35: CPT | Performed by: STUDENT IN AN ORGANIZED HEALTH CARE EDUCATION/TRAINING PROGRAM

## 2024-07-28 PROCEDURE — 700101 HCHG RX REV CODE 250: Performed by: STUDENT IN AN ORGANIZED HEALTH CARE EDUCATION/TRAINING PROGRAM

## 2024-07-28 PROCEDURE — 80074 ACUTE HEPATITIS PANEL: CPT

## 2024-07-28 PROCEDURE — 700111 HCHG RX REV CODE 636 W/ 250 OVERRIDE (IP): Mod: JZ | Performed by: STUDENT IN AN ORGANIZED HEALTH CARE EDUCATION/TRAINING PROGRAM

## 2024-07-28 PROCEDURE — 96366 THER/PROPH/DIAG IV INF ADDON: CPT

## 2024-07-28 PROCEDURE — 80053 COMPREHEN METABOLIC PANEL: CPT

## 2024-07-28 PROCEDURE — 85027 COMPLETE CBC AUTOMATED: CPT

## 2024-07-28 RX ORDER — LIDOCAINE HYDROCHLORIDE 20 MG/ML
15 SOLUTION OROPHARYNGEAL
Status: DISCONTINUED | OUTPATIENT
Start: 2024-07-28 | End: 2024-07-29 | Stop reason: HOSPADM

## 2024-07-28 RX ORDER — HYDROCODONE BITARTRATE AND ACETAMINOPHEN 5; 325 MG/1; MG/1
0.5 TABLET ORAL EVERY 4 HOURS PRN
Status: DISCONTINUED | OUTPATIENT
Start: 2024-07-28 | End: 2024-07-29

## 2024-07-28 RX ORDER — ACETAMINOPHEN 500 MG
1000 TABLET ORAL EVERY 6 HOURS
Status: DISCONTINUED | OUTPATIENT
Start: 2024-07-28 | End: 2024-07-28

## 2024-07-28 RX ORDER — OXYCODONE HYDROCHLORIDE 5 MG/1
5 TABLET ORAL
Status: DISCONTINUED | OUTPATIENT
Start: 2024-07-28 | End: 2024-07-28

## 2024-07-28 RX ORDER — HYDROCODONE BITARTRATE AND ACETAMINOPHEN 5; 325 MG/1; MG/1
1 TABLET ORAL EVERY 4 HOURS PRN
Status: DISCONTINUED | OUTPATIENT
Start: 2024-07-28 | End: 2024-07-29

## 2024-07-28 RX ORDER — PREDNISOLONE ACETATE 10 MG/ML
1 SUSPENSION/ DROPS OPHTHALMIC 4 TIMES DAILY
Status: DISCONTINUED | OUTPATIENT
Start: 2024-07-28 | End: 2024-07-29 | Stop reason: HOSPADM

## 2024-07-28 RX ORDER — ASPIRIN 81 MG/1
81 TABLET ORAL 2 TIMES DAILY
Status: DISCONTINUED | OUTPATIENT
Start: 2024-07-28 | End: 2024-07-29

## 2024-07-28 RX ORDER — LABETALOL HYDROCHLORIDE 5 MG/ML
10 INJECTION, SOLUTION INTRAVENOUS EVERY 6 HOURS PRN
Status: DISCONTINUED | OUTPATIENT
Start: 2024-07-28 | End: 2024-07-29 | Stop reason: HOSPADM

## 2024-07-28 RX ORDER — PREDNISOLONE ACETATE 10 MG/ML
1 SUSPENSION/ DROPS OPHTHALMIC 2 TIMES DAILY
COMMUNITY

## 2024-07-28 RX ORDER — HYDROCORTISONE ACETATE 25 MG/1
25 SUPPOSITORY RECTAL EVERY 12 HOURS
COMMUNITY

## 2024-07-28 RX ORDER — NITROFURANTOIN 25; 75 MG/1; MG/1
100 CAPSULE ORAL 2 TIMES DAILY
Status: ON HOLD | COMMUNITY
Start: 2024-07-24 | End: 2024-07-29

## 2024-07-28 RX ORDER — ONDANSETRON 4 MG/1
4 TABLET, ORALLY DISINTEGRATING ORAL EVERY 6 HOURS PRN
Status: ON HOLD | COMMUNITY
End: 2024-07-29

## 2024-07-28 RX ORDER — OXYCODONE HYDROCHLORIDE 5 MG/1
2.5 TABLET ORAL
Status: DISCONTINUED | OUTPATIENT
Start: 2024-07-28 | End: 2024-07-28

## 2024-07-28 RX ORDER — MORPHINE SULFATE 4 MG/ML
4 INJECTION INTRAVENOUS
Status: DISCONTINUED | OUTPATIENT
Start: 2024-07-28 | End: 2024-07-28

## 2024-07-28 RX ORDER — ACETAMINOPHEN 500 MG
1000 TABLET ORAL EVERY 6 HOURS PRN
Status: DISCONTINUED | OUTPATIENT
Start: 2024-08-02 | End: 2024-07-28

## 2024-07-28 RX ORDER — MORPHINE SULFATE 4 MG/ML
2 INJECTION INTRAVENOUS
Status: DISCONTINUED | OUTPATIENT
Start: 2024-07-28 | End: 2024-07-29

## 2024-07-28 RX ORDER — OFLOXACIN 3 MG/ML
1 SOLUTION/ DROPS OPHTHALMIC 4 TIMES DAILY
COMMUNITY

## 2024-07-28 RX ORDER — ACETAMINOPHEN 325 MG/1
650 TABLET ORAL EVERY 4 HOURS PRN
Status: DISCONTINUED | OUTPATIENT
Start: 2024-07-28 | End: 2024-07-28

## 2024-07-28 RX ADMIN — HYDROCODONE BITARTRATE AND ACETAMINOPHEN 1 TABLET: 5; 325 TABLET ORAL at 18:18

## 2024-07-28 RX ADMIN — HYDROCODONE BITARTRATE AND ACETAMINOPHEN 1 TABLET: 5; 325 TABLET ORAL at 12:46

## 2024-07-28 RX ADMIN — ENOXAPARIN SODIUM 40 MG: 100 INJECTION SUBCUTANEOUS at 18:18

## 2024-07-28 RX ADMIN — PREDNISOLONE ACETATE 1 DROP: 10 SUSPENSION/ DROPS OPHTHALMIC at 20:42

## 2024-07-28 RX ADMIN — MORPHINE SULFATE 4 MG: 4 INJECTION, SOLUTION INTRAMUSCULAR; INTRAVENOUS at 02:01

## 2024-07-28 RX ADMIN — HYDROCODONE BITARTRATE AND ACETAMINOPHEN 1 TABLET: 5; 325 TABLET ORAL at 22:22

## 2024-07-28 RX ADMIN — ONDANSETRON 4 MG: 2 INJECTION INTRAMUSCULAR; INTRAVENOUS at 02:01

## 2024-07-28 RX ADMIN — MORPHINE SULFATE 2 MG: 4 INJECTION, SOLUTION INTRAMUSCULAR; INTRAVENOUS at 14:09

## 2024-07-28 RX ADMIN — ONDANSETRON 4 MG: 2 INJECTION INTRAMUSCULAR; INTRAVENOUS at 18:18

## 2024-07-28 RX ADMIN — LABETALOL HYDROCHLORIDE 10 MG: 5 INJECTION, SOLUTION INTRAVENOUS at 04:46

## 2024-07-28 RX ADMIN — CEFTRIAXONE SODIUM 2000 MG: 10 INJECTION, POWDER, FOR SOLUTION INTRAVENOUS at 20:38

## 2024-07-28 RX ADMIN — ASPIRIN 81 MG: 81 TABLET, COATED ORAL at 18:18

## 2024-07-28 RX ADMIN — METRONIDAZOLE 500 MG: 5 INJECTION, SOLUTION INTRAVENOUS at 18:22

## 2024-07-28 RX ADMIN — MORPHINE SULFATE 4 MG: 4 INJECTION, SOLUTION INTRAMUSCULAR; INTRAVENOUS at 04:45

## 2024-07-28 RX ADMIN — ASPIRIN 81 MG: 81 TABLET, COATED ORAL at 06:19

## 2024-07-28 RX ADMIN — PROCHLORPERAZINE EDISYLATE 10 MG: 5 INJECTION INTRAMUSCULAR; INTRAVENOUS at 14:10

## 2024-07-28 RX ADMIN — METRONIDAZOLE 500 MG: 5 INJECTION, SOLUTION INTRAVENOUS at 06:22

## 2024-07-28 RX ADMIN — ONDANSETRON 4 MG: 2 INJECTION INTRAMUSCULAR; INTRAVENOUS at 12:47

## 2024-07-28 RX ADMIN — MORPHINE SULFATE 2 MG: 4 INJECTION, SOLUTION INTRAMUSCULAR; INTRAVENOUS at 23:27

## 2024-07-28 RX ADMIN — ONDANSETRON 4 MG: 2 INJECTION INTRAMUSCULAR; INTRAVENOUS at 06:20

## 2024-07-28 RX ADMIN — BENZOCAINE AND MENTHOL 1 LOZENGE: 15; 3.6 LOZENGE ORAL at 08:40

## 2024-07-28 RX ADMIN — ACETAMINOPHEN 1000 MG: 500 TABLET ORAL at 07:56

## 2024-07-28 RX ADMIN — LISINOPRIL 10 MG: 10 TABLET ORAL at 06:19

## 2024-07-28 RX ADMIN — MORPHINE SULFATE 2 MG: 4 INJECTION, SOLUTION INTRAMUSCULAR; INTRAVENOUS at 20:33

## 2024-07-28 ASSESSMENT — ENCOUNTER SYMPTOMS
MYALGIAS: 0
FOCAL WEAKNESS: 0
CHILLS: 0
ORTHOPNEA: 0
NECK PAIN: 0
VOMITING: 1
FEVER: 0
SHORTNESS OF BREATH: 0
WEAKNESS: 1
BLURRED VISION: 0
DOUBLE VISION: 0
PALPITATIONS: 0
DIZZINESS: 0
SPUTUM PRODUCTION: 0
ABDOMINAL PAIN: 1
HEADACHES: 0
COUGH: 0
PND: 0
HEARTBURN: 0
SORE THROAT: 1
DEPRESSION: 0
NAUSEA: 1

## 2024-07-28 ASSESSMENT — PAIN DESCRIPTION - PAIN TYPE
TYPE: ACUTE PAIN

## 2024-07-28 NOTE — PROGRESS NOTES
Hospital Medicine Daily Progress Note    Date of Service  7/28/2024    Chief Complaint  Luz Maria Saavedra is a 83 y.o. female admitted 7/27/2024 with diffuse abd pain    Hospital Course  Luz Maria Saavedra is a 83 y.o. female who presented 7/27/2024 with abdominal pain.  Patient stated that she has been experiencing diffuse abdominal pain over the last 2 to 3 days with worsening distention and associated with nausea and vomiting.  She is reported a poor p.o. intake.  Denied any constipation or diarrhea.  Last bowel movement was earlier 7/27/ She also feels like she has been experiencing urinary tract infection over the last week.  She has had dysuria and increased urinary frequency as well.      In the emergency department, patient was found to be tachycardic.  She leukocytosis greater than 20,000.  CT abdomen pelvis did reveal gastritis.  No signs of perforation.  She had a mildly positive urinalysis as well.  Pt was admitted for further management. Pt was empirically started on IV Abx.     Interval Problem Update  Hospital course under my care 7/28:   Afebrile and vitals wnl except mildly elevated BP though improving.   Exam at bedside was notable for mild diffuse Abd tenderness.   Labs and imagings findings discussed with patient.     WBC 20 > 13.3    CMP noted to have an acute liver failure now with AST/ALT/ALKP trended up significantly and Tbili (CT AP was on arrival was unrevealing); US RUQ ordered.    Pain regimen adjusted.     I have discussed this patient's plan of care and discharge plan at IDT rounds today with Case Management, Nursing, Nursing leadership, and other members of the IDT team.    Consultants/Specialty  N/A    Code Status  Full Code    Disposition  The patient is not medically cleared for discharge to home or a post-acute facility.  Anticipate discharge to: home with close outpatient follow-up    I have placed the appropriate orders for post-discharge needs.    Review of Systems  Review of Systems    Constitutional:  Positive for malaise/fatigue. Negative for chills and fever.   HENT:  Positive for sore throat. Negative for congestion.    Eyes:  Negative for blurred vision and double vision.   Respiratory:  Negative for cough, sputum production and shortness of breath.    Cardiovascular:  Negative for chest pain, palpitations, orthopnea, leg swelling and PND.   Gastrointestinal:  Positive for abdominal pain, nausea and vomiting. Negative for heartburn.   Genitourinary:  Positive for dysuria. Negative for frequency and urgency.   Musculoskeletal:  Negative for myalgias and neck pain.   Neurological:  Positive for weakness. Negative for dizziness, focal weakness and headaches.   Psychiatric/Behavioral:  Negative for depression.         Physical Exam  Temp:  [36.4 °C (97.5 °F)-37.2 °C (99 °F)] 36.5 °C (97.7 °F)  Pulse:  [] 53  Resp:  [15-18] 16  BP: (127-205)/(59-96) 153/67  SpO2:  [91 %-98 %] 91 %    Physical Exam  Vitals and nursing note reviewed.   Constitutional:       General: She is not in acute distress.     Appearance: Normal appearance. She is not ill-appearing.   HENT:      Head: Normocephalic and atraumatic.      Mouth/Throat:      Mouth: Mucous membranes are moist.      Pharynx: Oropharynx is clear.   Eyes:      General: No scleral icterus.     Conjunctiva/sclera: Conjunctivae normal.   Cardiovascular:      Rate and Rhythm: Normal rate and regular rhythm.      Pulses: Normal pulses.      Heart sounds: Normal heart sounds.   Pulmonary:      Effort: Pulmonary effort is normal. No respiratory distress.      Breath sounds: Normal breath sounds. No wheezing.   Abdominal:      General: Bowel sounds are normal. There is no distension.      Palpations: Abdomen is soft.      Tenderness: There is abdominal tenderness (Mild).   Musculoskeletal:         General: No swelling or tenderness. Normal range of motion.   Skin:     General: Skin is warm and dry.      Capillary Refill: Capillary refill takes less  than 2 seconds.   Neurological:      General: No focal deficit present.      Mental Status: She is alert and oriented to person, place, and time. Mental status is at baseline.   Psychiatric:         Mood and Affect: Mood normal.         Fluids    Intake/Output Summary (Last 24 hours) at 7/28/2024 0910  Last data filed at 7/28/2024 0500  Gross per 24 hour   Intake 120 ml   Output --   Net 120 ml       Laboratory  Recent Labs     07/27/24  1613 07/28/24  0640   WBC 20.1* 13.3*   RBC 4.75 4.50   HEMOGLOBIN 13.3 12.9   HEMATOCRIT 41.9 39.7   MCV 88.2 88.2   MCH 28.0 28.7   MCHC 31.7* 32.5   RDW 43.6 44.3   PLATELETCT 271 202   MPV 9.5 10.6     Recent Labs     07/27/24  1613 07/28/24  0640   SODIUM 137 137   POTASSIUM 4.5 4.5   CHLORIDE 103 104   CO2 23 20   GLUCOSE 145* 147*   BUN 13 13   CREATININE 0.77 0.65   CALCIUM 9.3 8.4*                   Imaging  CT-ABDOMEN-PELVIS WITH   Final Result      1.  Wall thickening of distal stomach suggesting gastritis.  No evidence of perforation.   2.  No bowel obstruction.   3.  Colonic diverticulosis without evidence for diverticulitis.   4.  Fatty infiltration of liver.      DX-CHEST-PORTABLE (1 VIEW)   Final Result      1.  Persistent hypoinflation with marked elevation of RIGHT hemidiaphragm.   2.  No pneumonia or overt pulmonary edema.   3.  Stable cardiac likely.      US-RUQ    (Results Pending)        Assessment/Plan  * Intractable abdominal pain- (present on admission)  Assessment & Plan  Patient presents with several day history of diffuse generalized abdominal pain.  Intractable while in the emergency department  CT imaging obtained -- Wall thickening of distal stomach suggesting gastritis. No evidence of perforation.   Possibly viral gastroenteritis, however patient presented with persistent tachycardia and leukocytosis  Will empirically start patient on ceftriaxone and Flagyl  De-escalate when appropriate  Starting patient on IV and p.o. narcotics.  She will therefore  need close hemodynamic monitoring    Acute liver failure without hepatic coma  Assessment & Plan  Unclear etiology   Normal range on admission and normal CT AP  Reviewed meds   Hep panel  Will get US RUQ  Trend     Nausea & vomiting- (present on admission)  Assessment & Plan  IV and PO antiemetics     UTI (urinary tract infection)- (present on admission)  Assessment & Plan  Symptomatic.  Patient complains of increased urinary frequency.  She has been having fevers and chills over the last several days well  There are some small leuk esterase in her urinalysis  Patient will be on ceftriaxone  Follow-up urine culture    HTN (hypertension)- (present on admission)  Assessment & Plan  C/w home BP meds    Leukocytosis- (present on admission)  Assessment & Plan  Patient presents with leukocytosis greater than 20,000.  It is possible that this is all reactive given her nausea and vomiting.  However, she does have evidence of gastritis on CT imaging.  Urinalysis did show some small leuk esterase as well.  Will empirically start patient on ceftriaxone and Flagyl to cover any GI vs  causes     Will monitor clinical progress and de-escate Abx         VTE prophylaxis:    enoxaparin ppx      I have performed a physical exam and reviewed and updated ROS and Plan today (7/28/2024).

## 2024-07-28 NOTE — ASSESSMENT & PLAN NOTE
Patient presents with several day history of diffuse generalized abdominal pain.  Intractable while in the emergency department  CT imaging obtained -- Wall thickening of distal stomach suggesting gastritis. No evidence of perforation.   Possibly viral gastroenteritis, however patient presented with persistent tachycardia and leukocytosis  Will empirically start patient on ceftriaxone and Flagyl  De-escalate when appropriate  Starting patient on IV and p.o. narcotics.  She will therefore need close hemodynamic monitoring

## 2024-07-28 NOTE — PROGRESS NOTES
Patient offered oxycodone for management of 8/10 pain. Patient refused medication and states that she has a reaction to the med. Patient educated on pain control options and verbalized understanding.

## 2024-07-28 NOTE — DISCHARGE INSTRUCTIONS
Please take the antibiotics as directed.  Please follow-up with your PCP for further evaluation treatment.  If any worsened symptoms or concerns please return to the ED.  Thank for coming in today.

## 2024-07-28 NOTE — ASSESSMENT & PLAN NOTE
Symptomatic.  Patient complains of increased urinary frequency.  She has been having fevers and chills over the last several days well  There are some small leuk esterase in her urinalysis  Patient will be on ceftriaxone  Follow-up urine culture

## 2024-07-28 NOTE — ED NOTES
Pt to the bathroom via wheelchair, pt was nauseous and was doubled over grabbing at her abdomen with complaints of pain. Pt was weak standing up and walking from wheelchair to toilet. This RN assisted her back in [facility] and notified ERP.

## 2024-07-28 NOTE — PROGRESS NOTES
Received report from night shift RN. Assumed pt care at 0710. Morning assessment completed per flowsheet. Pt updated on POC. Standard safety precautions in place. Pending US, pain management, nausea control.

## 2024-07-28 NOTE — ED NOTES
Medication reconciliation is incomplete. Unable to obtain because patient could not participate and home pharmacy was closed - [facility] [phone].     Daya Gregorio, Pharmacy Intern

## 2024-07-28 NOTE — H&P
Hospital Medicine History & Physical Note    Date of Service  7/27/2024    Primary Care Physician  William Coleman M.D.      Code Status  Full Code    Chief Complaint  Chief Complaint   Patient presents with    N/V    Shortness of Breath    Chest Pain       History of Presenting Illness  Luz Maria Saavedra is a 83 y.o. female who presented 7/27/2024 with abdominal pain.  Patient states that she has been experiencing diffuse abdominal pain over the last 2 to 3 days.  She was feels like her abdomen is more distended.  This is associated with nausea and vomiting.  She is also poor p.o. intake.  Denies any constipation or diarrhea.  Last bowel movement was earlier today.  She also feels like she has been experiencing urinary tract infection over the last week.  She has had dysuria and increased urinary frequency as well.  In the emergency department, patient was found to be tachycardic.  She leukocytosis greater than 20,000.  CT abdomen pelvis did reveal gastritis.  No signs of perforation.  She had a mildly positive urinalysis as well.  Will admit the patient for pain control, and her nausea and vomiting.    I discussed the plan of care with patient.    Review of Systems  Review of Systems   Constitutional:  Negative for chills and fever.   HENT:  Negative for congestion, ear discharge, ear pain, nosebleeds, sinus pain and tinnitus.    Eyes:  Negative for double vision, photophobia, pain and discharge.   Respiratory:  Negative for cough, hemoptysis, sputum production and shortness of breath.    Cardiovascular:  Negative for chest pain, palpitations and orthopnea.   Gastrointestinal:  Positive for abdominal pain, nausea and vomiting. Negative for blood in stool, constipation and diarrhea.   Genitourinary:  Negative for frequency, hematuria and urgency.   Musculoskeletal:  Negative for back pain and neck pain.   Neurological:  Negative for dizziness and headaches.   Psychiatric/Behavioral:  Negative for hallucinations,  "substance abuse and suicidal ideas. The patient is not nervous/anxious and does not have insomnia.        Past Medical History   has a past medical history of Anxiety and depression, Hypertension, Renal disorder, and Stroke (HCC).    Surgical History   has a past surgical history that includes pham by laparoscopy; neuroma excision (11/21/08); cataract phaco with iol (1/27/2009); cataract phaco with iol (2/10/2009); cholecystectomy; exploratory laparotomy (8/4/2012); and hiatal hernia repair.     Family History  family history includes No Known Problems in her father and mother.   Family history reviewed with patient. There is no family history that is pertinent to the chief complaint.     Social History   reports that she has never smoked. She has never used smokeless tobacco. She reports current drug use. She reports that she does not drink alcohol.    Allergies  Allergies   Allergen Reactions    Amitriptyline     Ciprofloxacin     Codeine     Hydralazine      Face swelling    Other Drug      Pt states she is allergic to many antibiotics but she does not remember what they are.    Pcn [Penicillins]     Potassium Shortness of Breath     \"I feel like I can't breath\".  Has tolerate IV KCl in past    Xanax [Alprazolam]     Zoloft     Zyrtec [Cetirizine]        Medications  Prior to Admission Medications   Prescriptions Last Dose Informant Patient Reported? Taking?   amLODIPine (NORVASC) 5 MG Tab   No No   Sig: Take 1 Tab by mouth every day.   Patient not taking: Reported on 2/28/2023   aspirin EC (ECOTRIN) 81 MG Tablet Delayed Response  Patient Yes No   Sig: Take 81 mg by mouth 2 Times a Day.   busPIRone (BUSPAR) 15 MG tablet  Patient Yes No   Sig: Take 15 mg by mouth 3 times a day as needed.   Patient not taking: Reported on 2/28/2023   clopidogrel (PLAVIX) 75 MG Tab   No No   Sig: Take 1 Tablet by mouth every day.   gabapentin (NEURONTIN) 100 MG Cap   Yes No   Sig: Take 100 mg by mouth at bedtime.   lisinopril " (PRINIVIL) 10 MG Tab   Yes No   Sig: Take 10 mg by mouth every day.   simvastatin (ZOCOR) 20 MG Tab   No No   Sig: Take 1 Tab by mouth every evening.   Patient not taking: Reported on 2/28/2023   tamsulosin (FLOMAX) 0.4 MG capsule  Patient No No   Sig: Take 1 Cap by mouth ONE-HALF HOUR AFTER BREAKFAST.   Patient not taking: Reported on 2/28/2023   vitamin D, Ergocalciferol, (DRISDOL) 10880 UNITS CAPS capsule  Patient Yes No   Sig: Take 50,000 Units by mouth every Saturday. saturday   Patient not taking: Reported on 2/28/2023      Facility-Administered Medications: None       Physical Exam  Temp:  [36.4 °C (97.5 °F)] 36.4 °C (97.5 °F)  Pulse:  [] 79  Resp:  [16-18] 18  BP: (127-170)/(59-96) 170/64  SpO2:  [91 %-94 %] 93 %  Blood Pressure : (!) 170/64   Temperature: 36.4 °C (97.5 °F)   Pulse: 79   Respiration: 18   Pulse Oximetry: 93 %       Physical Exam  Constitutional:       General: She is not in acute distress.     Appearance: Normal appearance. She is normal weight. She is not ill-appearing, toxic-appearing or diaphoretic.   HENT:      Head: Normocephalic and atraumatic.      Nose: Nose normal.      Mouth/Throat:      Mouth: Mucous membranes are moist.   Eyes:      Extraocular Movements: Extraocular movements intact.      Pupils: Pupils are equal, round, and reactive to light.   Cardiovascular:      Rate and Rhythm: Normal rate and regular rhythm.      Pulses: Normal pulses.      Heart sounds: Normal heart sounds. No murmur heard.     No friction rub. No gallop.   Pulmonary:      Effort: Pulmonary effort is normal. No respiratory distress.      Breath sounds: No stridor. No wheezing, rhonchi or rales.   Chest:      Chest wall: No tenderness.   Abdominal:      General: Abdomen is flat. There is distension.      Palpations: Abdomen is soft. There is no mass.      Tenderness: There is abdominal tenderness. There is no guarding or rebound.      Hernia: No hernia is present.   Musculoskeletal:         General:  No swelling, tenderness, deformity or signs of injury.      Right lower leg: No edema.      Left lower leg: No edema.      Comments:      Skin:     General: Skin is warm and dry.      Capillary Refill: Capillary refill takes less than 2 seconds.      Coloration: Skin is not jaundiced or pale.      Findings: No bruising, erythema, lesion or rash.   Neurological:      General: No focal deficit present.      Mental Status: She is alert. Mental status is at baseline. She is disoriented.      Cranial Nerves: No cranial nerve deficit.      Sensory: No sensory deficit.      Motor: No weakness.      Coordination: Coordination normal.   Psychiatric:         Mood and Affect: Mood normal.         Behavior: Behavior normal.         Laboratory:  Recent Labs     07/27/24  1613   WBC 20.1*   RBC 4.75   HEMOGLOBIN 13.3   HEMATOCRIT 41.9   MCV 88.2   MCH 28.0   MCHC 31.7*   RDW 43.6   PLATELETCT 271   MPV 9.5     Recent Labs     07/27/24  1613   SODIUM 137   POTASSIUM 4.5   CHLORIDE 103   CO2 23   GLUCOSE 145*   BUN 13   CREATININE 0.77   CALCIUM 9.3     Recent Labs     07/27/24  1613   ALTSGPT 16   ASTSGOT 23   ALKPHOSPHAT 100*   TBILIRUBIN 0.6   GLUCOSE 145*         Recent Labs     07/27/24  1613   NTPROBNP 360*         Recent Labs     07/27/24  1613   TROPONINT 9       Imaging:  CT-ABDOMEN-PELVIS WITH   Final Result      1.  Wall thickening of distal stomach suggesting gastritis.  No evidence of perforation.   2.  No bowel obstruction.   3.  Colonic diverticulosis without evidence for diverticulitis.   4.  Fatty infiltration of liver.      DX-CHEST-PORTABLE (1 VIEW)   Final Result      1.  Persistent hypoinflation with marked elevation of RIGHT hemidiaphragm.   2.  No pneumonia or overt pulmonary edema.   3.  Stable cardiac likely.          X-Ray:  I have personally reviewed the images and compared with prior images.  EKG:  I have personally reviewed the images and compared with prior images.    Assessment/Plan:  Justification  for Admission Status  I anticipate this patient is appropriate for observation status at this time because intractable abdominal pain     Patient will need a Med/Surg bed on MEDICAL service .  The need is secondary to intractable abdominal pain .    * Intractable abdominal pain- (present on admission)  Assessment & Plan  Patient presents with several day history of diffuse generalized abdominal pain.  Intractable while in the emergency department  CT imaging obtained -- Wall thickening of distal stomach suggesting gastritis. No evidence of perforation.   Possibly viral gastroenteritis, however patient presented with persistent tachycardia and leukocytosis  Will empirically start patient on ceftriaxone and Flagyl  De-escalate when appropriate  Starting patient on IV and p.o. narcotics.  She will therefore need close hemodynamic monitoring    Leukocytosis- (present on admission)  Assessment & Plan  Patient presents with leukocytosis greater than 20,000.  It is possible that this is all reactive given her nausea and vomiting.  However she does have evidence of gastritis on CT imaging.  Urinalysis did show some small leuk esterase as well.  Will empirically start patient on ceftriaxone and Flagyl to cover any GI vs  causes     Nausea & vomiting  Assessment & Plan  IV and PO antiemetics     UTI (urinary tract infection)- (present on admission)  Assessment & Plan  Symptomatic.  Patient complains of increased urinary frequency.  She has been having fevers and chills over the last several days well  There are some small leuk esterase in her urinalysis  Patient will be on ceftriaxone  Follow-up urine culture        VTE prophylaxis: enoxaparin ppx

## 2024-07-28 NOTE — ED NOTES
Bedside report received from off going RN/tech: Selina RN, assumed care of patient.  POC discussed with patient. Call light within reach, all needs addressed at this time.       Fall risk interventions in place: Move the patient closer to the nurse's station, Patient's personal possessions are with in their safe reach, Place socks on patient, Place fall risk sign on patient's door, and Keep floor surfaces clean and dry (all applicable per [place] Fall risk assessment)   Continuous monitoring: Cardiac Leads, Pulse Ox, or Blood Pressure  IVF/IV medications: Not Applicable   Oxygen: Room Air  Bedside sitter: Not Applicable   Isolation: Not Applicable

## 2024-07-28 NOTE — PROGRESS NOTES
Medication history reviewed with patient's home pharmacy .  Med rec is complete per recent fill history (last 90 days).  Outpatient antibiotics within the last 30 days: Last received 7 day course of Nitrofurantoin which was filled on 7/25. Last dose unknown.  Anticoagulants: NONE  Patient's home pharmacy - [facility] [place] ([phone])    Kelly Block, PhT

## 2024-07-28 NOTE — CARE PLAN
The patient is Stable - Low risk of patient condition declining or worsening    Shift Goals  Clinical Goals: pain control, N/V cntrol  Patient Goals: pain control, n/v control  Family Goals: JUAN DANIEL    Progress made toward(s) clinical / shift goals:    Problem: Pain - Standard  Goal: Alleviation of pain or a reduction in pain to the patient’s comfort goal  Description: Target End Date:  Prior to discharge or change in level of care    Document on Vitals flowsheet    1.  Document pain using the appropriate pain scale per order or unit policy  2.  Educate and implement non-pharmacologic comfort measures (i.e. relaxation, distraction, massage, cold/heat therapy, etc.)  3.  Pain management medications as ordered  4.  Reassess pain after pain med administration per policy  5.  If opiods administered assess patient's response to pain medication is appropriate per POSS sedation scale  6.  Follow pain management plan developed in collaboration with patient and interdisciplinary team (including palliative care or pain specialists if applicable)  Outcome: Progressing     Problem: Knowledge Deficit - Standard  Goal: Patient and family/care givers will demonstrate understanding of plan of care, disease process/condition, diagnostic tests and medications  Description: Target End Date:  1-3 days or as soon as patient condition allows    Document in Patient Education    1.  Patient and family/caregiver oriented to unit, equipment, visitation policy and means for communicating concern  2.  Complete/review Learning Assessment  3.  Assess knowledge level of disease process/condition, treatment plan, diagnostic tests and medications  4.  Explain disease process/condition, treatment plan, diagnostic tests and medications  Outcome: Progressing     Problem: Fall Risk  Goal: Patient will remain free from falls  Description: Target End Date:  Prior to discharge or change in level of care    Document interventions on the [facility] Fall Risk  Assessment    1.  Assess for fall risk factors  2.  Implement fall precautions  Outcome: Progressing     Problem: Skin Integrity  Goal: Skin integrity is maintained or improved  Description: Target End Date:  Prior to discharge or change in level of care    Document interventions on Skin Risk/Chrsit flowsheet groups and corresponding LDA    1.  Assess and monitor skin integrity, appearance and/or temperature  2.  Assess risk factors for impaired skin integrity and/or pressures ulcers  3.  Implement precautions to protect skin integrity in collaboration with interdisciplinary team  4.  Implement pressure ulcer prevention protocol if at risk for skin breakdown  5.  Confirm wound care consult if at risk for skin breakdown  6.  Ensure patient use of pressure relieving devices  (Low air loss bed, waffle overlay, heel protectors, ROHO cushion, etc)  Outcome: Progressing       Patient is not progressing towards the following goals:

## 2024-07-28 NOTE — PROGRESS NOTES
Patient A x O 4, comaplins of 10/10 abdominal pain, medication given see MAR. Patient continuously dry heaving, medication given for N/V see MAR. Patient resting In bed, no further needs at this time, plan of care explained.

## 2024-07-28 NOTE — ED PROVIDER NOTES
ED Provider Note    Scribed for Josh Girard by Kan Herbert. 7/27/2024  6:00 PM    Primary care provider: William Coleman M.D.  Means of arrival: EMS  History obtained from: Patient  History limited by: None    CHIEF COMPLAINT  Chief Complaint   Patient presents with    N/V    Shortness of Breath    Chest Pain     EXTERNAL RECORDS REVIEWED  Outpatient Notes Seen at primary care three days ago for lower abdominal pain    HPI/ROS    LIMITATION TO HISTORY   Select: : None    HPI  Luz Maria Saavedra is a 83 y.o. female who presents to the Emergency Department for possible allergic reaction onset prior to arrival. She was recently diagnosed with UTI, she was having dysuria and flank pain for a few days. She had tried OTC medications such as Azo and drinking cranberry juice but it was not effective. She was started on Bactrim. However she has a lot of allergies, was told that if she begins to feel poor that she needs to come back. After taking it she began to have body aches, shortness of breath and chest pain. She endorses associated nausea, vomiting and cough. She still is having dysuria. She denies any diarrhea. She occasionally drinks red wine.     REVIEW OF SYSTEMS  As above, all other systems reviewed and are negative.   See HPI for further details.     PAST MEDICAL HISTORY   has a past medical history of Anxiety and depression, Hypertension, Renal disorder, and Stroke (HCC).  SURGICAL HISTORY   has a past surgical history that includes pham by laparoscopy; neuroma excision (11/21/08); cataract phaco with iol (1/27/2009); cataract phaco with iol (2/10/2009); cholecystectomy; exploratory laparotomy (8/4/2012); and hiatal hernia repair.  SOCIAL HISTORY  Social History     Tobacco Use    Smoking status: Never    Smokeless tobacco: Never   Substance Use Topics    Alcohol use: No    Drug use: Yes      Social History     Substance and Sexual Activity   Drug Use Yes     FAMILY HISTORY  Family History   Problem  "Relation Age of Onset    No Known Problems Mother     No Known Problems Father      CURRENT MEDICATIONS  Home Medications    **Home medications have not yet been reviewed for this encounter**       Audit from Redirected Encounters    **Home medications have not yet been reviewed for this encounter**       ALLERGIES  Allergies   Allergen Reactions    Amitriptyline     Ciprofloxacin     Codeine     Hydralazine      Face swelling    Other Drug      Pt states she is allergic to many antibiotics but she does not remember what they are.    Pcn [Penicillins]     Potassium Shortness of Breath     \"I feel like I can't breath\".  Has tolerate IV KCl in past    Xanax [Alprazolam]     Zoloft     Zyrtec [Cetirizine]        PHYSICAL EXAM    VITAL SIGNS:   Vitals:    07/27/24 1547 07/27/24 1557 07/27/24 1834 07/27/24 1900   BP: (!) 127/96  129/59 (!) 170/64   Pulse: (!) 102  82 79   Resp: 16  18 18   Temp: 36.4 °C (97.5 °F)      TempSrc: Temporal      SpO2: 94%  91% 93%   Weight:  76.2 kg (167 lb 15.9 oz)     Height:  1.6 m (5' 3\")       Vitals: My interpretation: hypertensive, tachycardic, afebrile, not hypoxic    Reinterpretation of vitals: Unchanged unremarkable    Cardiac Monitor Interpretation: The cardiac monitor revealed normal Sinus Rhythm with tachycardia as interpreted by me. The cardiac monitor was ordered secondary to the patient's history of chest pain and to monitor for dysrhythmia and/or tachycardia.    PE:   Gen: sitting comfortably, speaking clearly, appears in no acute distress   ENT: Mucous membranes moist, posterior pharynx clear, uvula midline, nares patent bilaterally   Neck: Supple, FROM  Pulmonary: Lungs are clear to auscultation bilaterally. No tachypnea  CV:  RRR, no murmur appreciated, pulses 2+ in both upper and lower extremities  Abdomen: soft, mild generalized tenderness throughout abdomen, ND; no rebound/guarding  : no CVA or suprapubic tenderness   Neuro: A&Ox4 (person, place, time, situation), " speech fluent, gait steady, no focal deficits appreciated  Skin: No rash or lesions.  No pallor or jaundice.  No cyanosis.  Warm and dry.     DIAGNOSTIC STUDIES / PROCEDURES    LABS  Results for orders placed or performed during the hospital encounter of 07/27/24   CBC with Differential   Result Value Ref Range    WBC 20.1 (H) 4.8 - 10.8 K/uL    RBC 4.75 4.20 - 5.40 M/uL    Hemoglobin 13.3 12.0 - 16.0 g/dL    Hematocrit 41.9 37.0 - 47.0 %    MCV 88.2 81.4 - 97.8 fL    MCH 28.0 27.0 - 33.0 pg    MCHC 31.7 (L) 32.2 - 35.5 g/dL    RDW 43.6 35.9 - 50.0 fL    Platelet Count 271 164 - 446 K/uL    MPV 9.5 9.0 - 12.9 fL    Neutrophils-Polys 92.40 (H) 44.00 - 72.00 %    Lymphocytes 2.50 (L) 22.00 - 41.00 %    Monocytes 3.10 0.00 - 13.40 %    Eosinophils 0.20 0.00 - 6.90 %    Basophils 0.30 0.00 - 1.80 %    Immature Granulocytes 1.50 (H) 0.00 - 0.90 %    Nucleated RBC 0.00 0.00 - 0.20 /100 WBC    Neutrophils (Absolute) 18.58 (H) 1.82 - 7.42 K/uL    Lymphs (Absolute) 0.50 (L) 1.00 - 4.80 K/uL    Monos (Absolute) 0.63 0.00 - 0.85 K/uL    Eos (Absolute) 0.04 0.00 - 0.51 K/uL    Baso (Absolute) 0.06 0.00 - 0.12 K/uL    Immature Granulocytes (abs) 0.31 (H) 0.00 - 0.11 K/uL    NRBC (Absolute) 0.00 K/uL   Comp Metabolic Panel   Result Value Ref Range    Sodium 137 135 - 145 mmol/L    Potassium 4.5 3.6 - 5.5 mmol/L    Chloride 103 96 - 112 mmol/L    Co2 23 20 - 33 mmol/L    Anion Gap 11.0 7.0 - 16.0    Glucose 145 (H) 65 - 99 mg/dL    Bun 13 8 - 22 mg/dL    Creatinine 0.77 0.50 - 1.40 mg/dL    Calcium 9.3 8.5 - 10.5 mg/dL    Correct Calcium 9.5 8.5 - 10.5 mg/dL    AST(SGOT) 23 12 - 45 U/L    ALT(SGPT) 16 2 - 50 U/L    Alkaline Phosphatase 100 (H) 30 - 99 U/L    Total Bilirubin 0.6 0.1 - 1.5 mg/dL    Albumin 3.7 3.2 - 4.9 g/dL    Total Protein 7.2 6.0 - 8.2 g/dL    Globulin 3.5 1.9 - 3.5 g/dL    A-G Ratio 1.1 g/dL   Urinalysis, Culture if Indicated    Specimen: Urine, Clean Catch   Result Value Ref Range    Color DK Yellow      Character Cloudy (A)     Specific Gravity 1.024 <1.035    Ph 5.0 5.0 - 8.0    Glucose Negative Negative mg/dL    Ketones Trace (A) Negative mg/dL    Protein 30 (A) Negative mg/dL    Bilirubin Small (A) Negative    Urobilinogen, Urine 1.0 Negative    Nitrite Negative Negative    Leukocyte Esterase Small (A) Negative    Occult Blood Negative Negative    Micro Urine Req Microscopic    ESTIMATED GFR   Result Value Ref Range    GFR (CKD-EPI) 75 >60 mL/min/1.73 m 2   URINE MICROSCOPIC (W/UA)   Result Value Ref Range    WBC 20-50 (A) /hpf    RBC 2-5 (A) /hpf    Bacteria Negative None /hpf    Epithelial Cells Few /hpf    Hyaline Cast 6-10 (A) /lpf   LACTIC ACID   Result Value Ref Range    Lactic Acid 1.3 0.5 - 2.0 mmol/L   proBrain Natriuretic Peptide, NT   Result Value Ref Range    NT-proBNP 360 (H) 0 - 125 pg/mL   TROPONIN   Result Value Ref Range    Troponin T 9 6 - 19 ng/L   EKG (NOW)   Result Value Ref Range    Report       [facility] Emergency Dept.    Test Date:  2024  Pt Name:    JOSE DEL CID                Department: ER  MRN:        [id number]                      Room:  Gender:     Female                       Technician: [id number]  :        [id number]                   Requested By:ER TRIAGE PROTOCOL  Order #:    [id number]                    Reading MD: Josh Girard    Measurements  Intervals                                Axis  Rate:       100                          P:          19  NM:         118                          QRS:        17  QRSD:       75                           T:          5  QT:         346  QTc:        447    Interpretive Statements  Sinus tachycardia  Minimal ST depression, inferior leads  Compared to ECG 2024 14:31:50  ST (T wave) deviation now present  Sinus rhythm no longer present  T-wave abnormality no longer present  Electronically Signed On 2024 18:00:48 PDT by Josh Girard        All labs reviewed by me. Labs were compared to prior labs  if they were available. Significant for leukocytosis of 20,000, no anemia, normal electrolytes, normal glucose, normal renal function, normal liver enzymes, normal bilirubin, urinalysis is somewhat equivocal as there are 20-50 white blood cells and positive leukocyte esterase, however no bacteria, troponin negative, BNP negative, lactic acid normal.    RADIOLOGY  I have independently interpreted the diagnostic imaging associated with this visit and am waiting the final reading from the radiologist.   My preliminary interpretation is a follows: No free air, no kidney stones.  Radiologist interpretation is as follows:  CT-ABDOMEN-PELVIS WITH   Final Result      1.  Wall thickening of distal stomach suggesting gastritis.  No evidence of perforation.   2.  No bowel obstruction.   3.  Colonic diverticulosis without evidence for diverticulitis.   4.  Fatty infiltration of liver.      DX-CHEST-PORTABLE (1 VIEW)   Final Result      1.  Persistent hypoinflation with marked elevation of RIGHT hemidiaphragm.   2.  No pneumonia or overt pulmonary edema.   3.  Stable cardiac likely.        COURSE & MEDICAL DECISION MAKING  Nursing notes, VS, PMSFHx, labs, imaging, EKG reviewed in chart.    Heart Score: Low     ED Observation Status? No; Patient does not meet criteria for ED Observation.     Ddx: PE, MI, pneumonia, gastritis, gastroenteritis, colitis, constipation, small bowel obstruction, UTI    MDM: 6:00 PM Luz Maria Saavedra is a 83 y.o. female who presented with a host of symptoms, including dysuria, abdominal pain, chest pain, cough, nausea, vomiting, body aches, headache etc.  States has been ongoing for few days.  She also was diagnosed with a UTI reportedly was unable to tolerate the antibiotics and showing took 1 dose.  Upon arrival today she arrives with normal vital signs and is afebrile.  She has a benign physical exam other than some very minimal generalized nonspecific tenderness.  She had an EKG done showing borderline  sinus tachycardia but no ischemic changes or arrhythmia.  Chest x-ray independently interpreted by myself shows no signs of cardiomegaly or focal consolidation, radiologist agrees.  Will order CT imaging and labs for further characterization. All labs reviewed by me. Labs were compared to prior labs if they were available. Significant for leukocytosis of 20,000, no anemia, normal electrolytes, normal glucose, normal renal function, normal liver enzymes, normal bilirubin, urinalysis is somewhat equivocal as there are 20-50 white blood cells and positive leukocyte esterase, however no bacteria, troponin negative, BNP negative, lactic acid normal.  Her CT scan read by radiology states that there is some findings suggestive of gastritis which is consistent patient is nausea vomiting but otherwise is unremarkable.  Overall, patient does have a concerning leukocytosis, but I do think this could be reactive in nature secondary to her vomiting.  She was treated with a dose of ceftriaxone initially for acute UTI considered she was unable to tolerate her outpatient antibiotics.  On reevaluation patient is still having persistent and intractable nausea and vomiting.  She walks double over secondary to pain in her epigastrium.  She states it comes in waves.  She has received multiple doses of antiemetics and pain medications here, at this point I think she needs to be admitted for continued treatment of intractable nausea vomiting further evaluation.  Discussed with hospitalist and they are amenable to admission.  She was treated with 40 mg IV Pepcid as well as a GI cocktail prior to admission which improved some of her symptoms.    HYDRATION: Based on the patient's presentation of Acute Vomiting the patient was given IV fluids. IV Hydration was used because oral hydration was not adequate alone. Upon recheck following hydration, the patient was improved.     ADDITIONAL PROBLEM LIST AND DISPOSITION    I have discussed  management of the patient with the following physicians and JAROD's: Hospitalist    Discussion of management with other Q or appropriate source(s): None     Escalation of care considered, and ultimately not performed:acute inpatient care management, however at this time, the patient is most appropriate for outpatient management    Barriers to care at this time, including but not limited to: None    Decision tools and prescription drugs considered including, but not limited to: Antibiotics Keflex .    FINAL IMPRESSION  1. Intractable nausea and vomiting Acute   2. Chest pain, unspecified type Acute   3. Nausea and vomiting, unspecified vomiting type Acute   4. Shortness of breath Acute   5. Leukocytosis, unspecified type Acute   6. Sinus tachycardia Acute   7. Acute UTI Acute      Kan SIDHU (Scribe), am scribing for, and in the presence of, Josh Girard.    Electronically signed by: Kan Herbert (Addi), 7/27/2024    IJosh personally performed the services described in this documentation, as scribed by Kan Herbert in my presence, and it is both accurate and complete.    The note accurately reflects work and decisions made by me.  Josh Girard  7/27/2024  7:06 PM

## 2024-07-28 NOTE — ASSESSMENT & PLAN NOTE
Patient presents with leukocytosis greater than 20,000.  It is possible that this is all reactive given her nausea and vomiting.  However, she does have evidence of gastritis on CT imaging.  Urinalysis did show some small leuk esterase as well.  Will empirically start patient on ceftriaxone and Flagyl to cover any GI vs  causes     Will monitor clinical progress and de-escate Abx

## 2024-07-28 NOTE — ASSESSMENT & PLAN NOTE
Unclear etiology   Normal range on admission and normal CT AP  Reviewed meds   Hep panel  Will get US RUQ  Trend

## 2024-07-28 NOTE — HOSPITAL COURSE
This is an 83-year-old female with past medical history of hypertension, dyslipidemia, mood disorder who was admitted on 7/27/2024 with severe abdominal pain.    CT imaging noted wall thickening of distal stomach suggesting gastritis no evidence of perforation.  Patient started empiric antibiotics.  Patient to complete total 5-day course of antibiotic therapy as patient was formally treated with UTI with Macrobid which patient had adverse reaction to.    Patient noted to have acute transaminitis, elevated T. bili, concern for possible stone, RUQ US negative for any stones or obstruction. Hepatitis panel negative.  Transaminitis resolving.

## 2024-07-28 NOTE — PROGRESS NOTES
4 Eyes Skin Assessment Completed by ZULEMA Mortensen and Cameron RN.    Head WDL  Ears WDL  Nose WDL  Mouth WDL  Neck WDL  Breast/Chest Scar  Shoulder Blades WDL  Spine WDL  (R) Arm/Elbow/Hand Bruising  (L) Arm/Elbow/Hand Bruising  Abdomen Scar  Groin WDL  Scrotum/Coccyx/Buttocks WDL  (R) Leg Bruising  (L) Leg Bruising  (R) Heel/Foot/Toe WDL  (L) Heel/Foot/Toe WDL          Devices In Places Blood Pressure Cuff and Pulse Ox      Interventions In Place Pillows    Possible Skin Injury No    Pictures Uploaded Into Epic N/A  Wound Consult Placed N/A  RN Wound Prevention Protocol Ordered No

## 2024-07-29 ENCOUNTER — HOME HEALTH ADMISSION (OUTPATIENT)
Dept: HOME HEALTH SERVICES | Facility: HOME HEALTHCARE | Age: 84
End: 2024-07-29
Payer: MEDICARE

## 2024-07-29 ENCOUNTER — PHARMACY VISIT (OUTPATIENT)
Dept: PHARMACY | Facility: MEDICAL CENTER | Age: 84
End: 2024-07-29
Payer: COMMERCIAL

## 2024-07-29 VITALS
BODY MASS INDEX: 23.71 KG/M2 | RESPIRATION RATE: 18 BRPM | HEART RATE: 66 BPM | SYSTOLIC BLOOD PRESSURE: 159 MMHG | DIASTOLIC BLOOD PRESSURE: 67 MMHG | HEIGHT: 63 IN | WEIGHT: 133.82 LBS | TEMPERATURE: 97 F | OXYGEN SATURATION: 93 %

## 2024-07-29 LAB
ALBUMIN SERPL BCP-MCNC: 3.3 G/DL (ref 3.2–4.9)
ALBUMIN/GLOB SERPL: 1 G/DL
ALP SERPL-CCNC: 194 U/L (ref 30–99)
ALT SERPL-CCNC: 270 U/L (ref 2–50)
ANION GAP SERPL CALC-SCNC: 10 MMOL/L (ref 7–16)
AST SERPL-CCNC: 213 U/L (ref 12–45)
BACTERIA UR CULT: NORMAL
BILIRUB SERPL-MCNC: 0.4 MG/DL (ref 0.1–1.5)
BUN SERPL-MCNC: 8 MG/DL (ref 8–22)
CALCIUM ALBUM COR SERPL-MCNC: 9.7 MG/DL (ref 8.5–10.5)
CALCIUM SERPL-MCNC: 9.1 MG/DL (ref 8.5–10.5)
CHLORIDE SERPL-SCNC: 107 MMOL/L (ref 96–112)
CO2 SERPL-SCNC: 23 MMOL/L (ref 20–33)
CREAT SERPL-MCNC: 0.71 MG/DL (ref 0.5–1.4)
ERYTHROCYTE [DISTWIDTH] IN BLOOD BY AUTOMATED COUNT: 43.8 FL (ref 35.9–50)
GFR SERPLBLD CREATININE-BSD FMLA CKD-EPI: 83 ML/MIN/1.73 M 2
GLOBULIN SER CALC-MCNC: 3.2 G/DL (ref 1.9–3.5)
GLUCOSE SERPL-MCNC: 109 MG/DL (ref 65–99)
HCT VFR BLD AUTO: 37.4 % (ref 37–47)
HGB BLD-MCNC: 11.9 G/DL (ref 12–16)
MCH RBC QN AUTO: 28.1 PG (ref 27–33)
MCHC RBC AUTO-ENTMCNC: 31.8 G/DL (ref 32.2–35.5)
MCV RBC AUTO: 88.2 FL (ref 81.4–97.8)
PLATELET # BLD AUTO: 232 K/UL (ref 164–446)
PMV BLD AUTO: 10 FL (ref 9–12.9)
POTASSIUM SERPL-SCNC: 4.4 MMOL/L (ref 3.6–5.5)
PROT SERPL-MCNC: 6.5 G/DL (ref 6–8.2)
RBC # BLD AUTO: 4.24 M/UL (ref 4.2–5.4)
SIGNIFICANT IND 70042: NORMAL
SITE SITE: NORMAL
SODIUM SERPL-SCNC: 140 MMOL/L (ref 135–145)
SOURCE SOURCE: NORMAL
WBC # BLD AUTO: 7.2 K/UL (ref 4.8–10.8)

## 2024-07-29 PROCEDURE — 700102 HCHG RX REV CODE 250 W/ 637 OVERRIDE(OP): Performed by: STUDENT IN AN ORGANIZED HEALTH CARE EDUCATION/TRAINING PROGRAM

## 2024-07-29 PROCEDURE — A9270 NON-COVERED ITEM OR SERVICE: HCPCS

## 2024-07-29 PROCEDURE — A9270 NON-COVERED ITEM OR SERVICE: HCPCS | Performed by: STUDENT IN AN ORGANIZED HEALTH CARE EDUCATION/TRAINING PROGRAM

## 2024-07-29 PROCEDURE — 700111 HCHG RX REV CODE 636 W/ 250 OVERRIDE (IP): Mod: JG | Performed by: STUDENT IN AN ORGANIZED HEALTH CARE EDUCATION/TRAINING PROGRAM

## 2024-07-29 PROCEDURE — 700102 HCHG RX REV CODE 250 W/ 637 OVERRIDE(OP)

## 2024-07-29 PROCEDURE — 700111 HCHG RX REV CODE 636 W/ 250 OVERRIDE (IP): Mod: JZ | Performed by: STUDENT IN AN ORGANIZED HEALTH CARE EDUCATION/TRAINING PROGRAM

## 2024-07-29 PROCEDURE — 85027 COMPLETE CBC AUTOMATED: CPT

## 2024-07-29 PROCEDURE — 99239 HOSP IP/OBS DSCHRG MGMT >30: CPT | Performed by: GENERAL PRACTICE

## 2024-07-29 PROCEDURE — 80053 COMPREHEN METABOLIC PANEL: CPT

## 2024-07-29 PROCEDURE — RXMED WILLOW AMBULATORY MEDICATION CHARGE: Performed by: GENERAL PRACTICE

## 2024-07-29 RX ORDER — SUCRALFATE ORAL 1 G/10ML
1 SUSPENSION ORAL
Status: DISCONTINUED | OUTPATIENT
Start: 2024-07-29 | End: 2024-07-29 | Stop reason: HOSPADM

## 2024-07-29 RX ORDER — CEFDINIR 300 MG/1
300 CAPSULE ORAL 2 TIMES DAILY
Qty: 4 CAPSULE | Refills: 0 | Status: SHIPPED | OUTPATIENT
Start: 2024-07-30 | End: 2024-07-29

## 2024-07-29 RX ORDER — HYDROCODONE BITARTRATE AND ACETAMINOPHEN 5; 325 MG/1; MG/1
1 TABLET ORAL EVERY 4 HOURS PRN
Status: DISCONTINUED | OUTPATIENT
Start: 2024-07-29 | End: 2024-07-29 | Stop reason: HOSPADM

## 2024-07-29 RX ORDER — HYDROCODONE BITARTRATE AND ACETAMINOPHEN 5; 325 MG/1; MG/1
1 TABLET ORAL EVERY 8 HOURS PRN
Qty: 15 TABLET | Refills: 0 | Status: SHIPPED | OUTPATIENT
Start: 2024-07-29 | End: 2024-08-03

## 2024-07-29 RX ORDER — HYDROCODONE BITARTRATE AND ACETAMINOPHEN 5; 325 MG/1; MG/1
2 TABLET ORAL EVERY 4 HOURS PRN
Status: DISCONTINUED | OUTPATIENT
Start: 2024-07-29 | End: 2024-07-29 | Stop reason: HOSPADM

## 2024-07-29 RX ORDER — CEFDINIR 300 MG/1
300 CAPSULE ORAL 2 TIMES DAILY
Qty: 6 CAPSULE | Refills: 0 | Status: ACTIVE | OUTPATIENT
Start: 2024-07-29 | End: 2024-08-01

## 2024-07-29 RX ADMIN — MORPHINE SULFATE 2 MG: 4 INJECTION, SOLUTION INTRAMUSCULAR; INTRAVENOUS at 04:59

## 2024-07-29 RX ADMIN — METRONIDAZOLE 500 MG: 5 INJECTION, SOLUTION INTRAVENOUS at 05:10

## 2024-07-29 RX ADMIN — LISINOPRIL 10 MG: 10 TABLET ORAL at 05:09

## 2024-07-29 RX ADMIN — HYDROCODONE BITARTRATE AND ACETAMINOPHEN 1 TABLET: 5; 325 TABLET ORAL at 07:47

## 2024-07-29 RX ADMIN — PREDNISOLONE ACETATE 1 DROP: 10 SUSPENSION/ DROPS OPHTHALMIC at 09:14

## 2024-07-29 RX ADMIN — ASPIRIN 81 MG: 81 TABLET, COATED ORAL at 05:09

## 2024-07-29 RX ADMIN — ONDANSETRON 4 MG: 4 TABLET, ORALLY DISINTEGRATING ORAL at 06:10

## 2024-07-29 ASSESSMENT — COGNITIVE AND FUNCTIONAL STATUS - GENERAL
SUGGESTED CMS G CODE MODIFIER MOBILITY: CH
MOBILITY SCORE: 24
SUGGESTED CMS G CODE MODIFIER DAILY ACTIVITY: CH
DAILY ACTIVITIY SCORE: 24

## 2024-07-29 ASSESSMENT — PAIN DESCRIPTION - PAIN TYPE: TYPE: ACUTE PAIN

## 2024-07-29 NOTE — DISCHARGE PLANNING
Case Management Discharge Planning    Admission Date: 7/27/2024  GMLOS: 3.9  ALOS: 1    6-Clicks ADL Score:    6-Clicks Mobility Score:        Anticipated Discharge Dispo:      DME Needed: Yes    DME Ordered: Yes    Action(s) Taken: Discharge order was placed.. FWW order was placed. RNCM obtained DME choice for [facility] and faxed to [facility]. Referral sent to [facility].     Escalations Completed: None    Medically Clear: Yes    Next Steps: No other CM needs     Barriers to Discharge: None    Is the patient up for discharge tomorrow: No - today

## 2024-07-29 NOTE — DISCHARGE PLANNING
HTH/SCP TCN chart review completed. Collaborated with ELAINE Parnell prior to meeting with the pt. The most current review of medical record, knowledge of pt's PLOF and social support, LACE+ score of 73 was considered.  No 6 clicks scores.       Pt seen at bedside. Introduced TCN program. Provided education regarding post acute levels of care. Education provided regarding case management policy for blanket SNF referrals. Discussed HTH/SCP plan benefits. Pt verbalizes understanding.     Patient states she lives alone in a one level home with 1 step in and was independent with ADL's, IADL's, and mobility (cane outdoors only/no AD indoors).  She uses [employer] for transportation.  She used a cane outdoors only at her baseline level of function.  Patient states she is close to her baseline level of function.        Choice proactively obtained for [facility] ([facility]), faxed to [facility] and given to CM.  In collaboration with CM, current discharge considerations are noted to be home with possible HH vs.  close outpatient f/u when medically cleared.  TCN will continue to follow and collaborate with discharge planning team as additional post acute needs arise. Thank you.     Completed today:  Choice obtained: [facility] ([facility]).   Pt aware of [facility] blanket referral policy  SCP with [facility] PCP. Discussed possible outpatient transitional PCP follow up if indicated and pt refused stating she would schedule her own PCP f/u appointment.

## 2024-07-29 NOTE — FACE TO FACE
Face to Face Note  -  Durable Medical Equipment    Petrona Jensen D.O. - NPI: [id number]  I certify that this patient is under my care and that they have had a durable medical equipment(DME)face to face encounter by myself that meets the physician DME face-to-face encounter requirements with this patient on:    Date of encounter:   Patient:                    MRN:                       YOB: 2026  Luz Maria Saavedra  [id number]  1940     The encounter with the patient was in whole, or in part, for the following medical condition, which is the primary reason for durable medical equipment:  Other - Debility    I certify that, based on my findings, the following durable medical equipment is medically necessary:  Walkers.        ------------------------------------------------------------------------------------------------------------------    Face to Face Supporting Documentation - Home Health    The encounter with this patient was in whole or in part the primary reason for home health admission.    Date of encounter:   Patient:                    MRN:                       YOB: 2026  Luz Maria Saavedra  [id number]  1940     Home health to see patient for:  Skilled Nursing care for assessment, interventions & education, Registered dietitian consult, Medical social work consult, Home health aide, Physical Therapy evaluation and treatment, and Occupational therapy evaluation and treatment    Skilled need for:  New Onset Medical Diagnosis Abdominal Pain, debility    Skilled nursing interventions to include:  Comment: HH/PT/OT    Homebound evidenced status by:  Need the aid of supportive devices such as crutches, canes, wheelchairs or walkers. Leaving home must require a considerable and taxing effort. There must exist a normal inability to leave the home.    Community Physician to provide follow up care: William Coleman M.D.     Optional Interventions    Wound information &  treatment:    Home Infusion Therapy orders:    Line/Drain/Airway:    I certify the face to face encounter for this home care referral meets the CMS requirements and the encounter/clinical assessment with the patient was, in whole, or in part, for the medical condition(s) listed above, which is the primary reason for home health care. Based on my clinical findings: the service(s) are medically necessary, support the need for home health care, and the homebound criteria are met.  I certify that this patient has had a face to face encounter by myself.  Petrona Jensen D.O. - NPI: [id number]    *Debility, frailty and advanced age in the absence of an acute deterioration or exacerbation of a condition do not qualify a patient for home health.

## 2024-07-29 NOTE — PROGRESS NOTES
Patient refused bed alarm and told us to remove from under her sheet it bothers her. Patient is low fall risk education on fall precautions

## 2024-07-29 NOTE — PROGRESS NOTES
4 Eyes Skin Assessment Completed by ZULEMA Givens and ZULEMA Obregon.    Head WDL  Ears Redness and Blanching Glasses   Nose Redness and Blanching Glasses   Mouth WDL  Neck WDL  Breast/Chest WDL  Shoulder Blades WDL  Spine WDL  (R) Arm/Elbow/Hand WDL  (L) Arm/Elbow/Hand WDL  Abdomen WDL  Groin WDL  Scrotum/Coccyx/Buttocks WDL  (R) Leg WDL  (L) Leg WDL  (R) Heel/Foot/Toe WDL  (L) Heel/Foot/Toe WDL          Devices In Places PIV      Interventions In Place Pillows    Possible Skin Injury No    Pictures Uploaded Into Epic N/A  Wound Consult Placed N/A  RN Wound Prevention Protocol Ordered No

## 2024-07-29 NOTE — CARE PLAN
The patient is Stable - Low risk of patient condition declining or worsening    Shift Goals  Clinical Goals: patient will report a reduced pain from 10 to less than 5 at the end of the shift  Patient Goals: rest  Family Goals: peggy    Progress made toward(s) clinical / shift goals:  updated pt on POC and verbalized understanding. Medicated per MAR and tolerated well. Needs attended.     Patient is not progressing towards the following goals: PIV infiltrated and refused reinsertion at this time and requested Ultrasound guided PIV insertion instead.     Problem: Knowledge Deficit - Standard  Goal: Patient and family/care givers will demonstrate understanding of plan of care, disease process/condition, diagnostic tests and medications  Description: Target End Date:  1-3 days or as soon as patient condition allows    Document in Patient Education    1.  Patient and family/caregiver oriented to unit, equipment, visitation policy and means for communicating concern  2.  Complete/review Learning Assessment  3.  Assess knowledge level of disease process/condition, treatment plan, diagnostic tests and medications  4.  Explain disease process/condition, treatment plan, diagnostic tests and medications  Outcome: Not Progressing

## 2024-07-29 NOTE — DISCHARGE PLANNING
Received Choice form at 0816  Agency/Facility Name: [facility]  Referral sent per Choice form @ 8623

## 2024-07-29 NOTE — DISCHARGE PLANNING
TCN following. HTH/SCP chart reviewed. Collaborated with ELAINE Nolan. No new TCN needs identified in discharge planning at this time. Per review, noted patient acceptance to [facility]  7/29. CM advised they are following for patient need for walker. Please see prior TCN note from 7/28 for most recent discharge planning considerations if indicated. Thank you.     Completed:  Choice obtained:  ([facility] ). Per review, noted patient acceptance to [facility]  [facility] aware of [facility] blanket referral policy  SCP with [facility] PCP. Discussed possible outpatient transitional PCP follow up if indicated and pt refused stating she would schedule her own PCP f/u appointment.

## 2024-07-29 NOTE — DISCHARGE SUMMARY
Discharge Summary    CHIEF COMPLAINT ON ADMISSION  Chief Complaint   Patient presents with    N/V    Shortness of Breath    Chest Pain       Reason for Admission  EMS     Admission Date  7/27/2024    CODE STATUS  Full Code    HPI & HOSPITAL COURSE  This is an 83-year-old female with past medical history of hypertension, dyslipidemia, mood disorder who was admitted on 7/27/2024 with severe abdominal pain.    CT imaging noted wall thickening of distal stomach suggesting gastritis no evidence of perforation.  Patient started empiric antibiotics.  Patient to complete total 5-day course of antibiotic therapy as patient was formally treated with UTI with Macrobid which patient had adverse reaction to.    Patient noted to have acute transaminitis, elevated T. bili, concern for possible stone, RUQ US negative for any stones or obstruction. Hepatitis panel negative.  Transaminitis resolving.    Therefore, she is discharged in good and stable condition to home with close outpatient follow-up.    The patient met 2-midnight criteria for an inpatient stay at the time of discharge.    Discharge Date  7/29/2024    FOLLOW UP ITEMS POST DISCHARGE  Primary care physician    DISCHARGE DIAGNOSES  Principal Problem:    Intractable abdominal pain (POA: Yes)  Active Problems:    Leukocytosis (POA: Yes)      Overview: Elevated, but bandemia decreasing. Clinically improving . Continue to       trend closely.      CT abdomen 8/8 without obvious source.    HTN (hypertension) (POA: Yes)    UTI (urinary tract infection) (POA: Yes)      Overview: Diagnois update 10/1/2016    Nausea & vomiting (POA: Yes)    Acute liver failure without hepatic coma (POA: Unknown)    Intractable nausea and vomiting (POA: Yes)  Resolved Problems:    * No resolved hospital problems. *      FOLLOW UP  William Coleman M.D.  [address] [facility]   [address]  [place] [id number] [phone]      As needed, If symptoms worsen      MEDICATIONS ON DISCHARGE    "  Medication List        START taking these medications        Instructions   cefdinir 300 MG Caps  Commonly known as: Omnicef   Take 1 Capsule by mouth 2 times a day for 3 days.  Dose: 300 mg     HYDROcodone-acetaminophen 5-325 MG Tabs per tablet  Commonly known as: Norco   Take 1 Tablet by mouth every 8 hours as needed (Severe pain) for up to 5 days.  Dose: 1 Tablet            CHANGE how you take these medications        Instructions   aspirin EC 81 MG Tbec  What changed: when to take this  Commonly known as: Ecotrin   Take 1 Tablet by mouth every day.  Dose: 81 mg            CONTINUE taking these medications        Instructions   hydrocortisone 25 MG Supp  Commonly known as: Anusol-HC   Insert 25 mg into the rectum every 12 hours.  Dose: 25 mg     lisinopril 10 MG Tabs  Commonly known as: Prinivil   Take 10 mg by mouth every day.  Dose: 10 mg     ofloxacin 0.3 % Soln  Commonly known as: Ocuflox   Administer 1 Drop into affected eye(s) 4 times a day.  Dose: 1 Drop     ondansetron 4 MG Tbdp  Commonly known as: Zofran ODT   Take 1 Tablet by mouth every 6 hours as needed for Nausea/Vomiting.  Dose: 4 mg     polyethylene glycol/lytes Pack  Commonly known as: Miralax   Take 17 g by mouth every day.  Dose: 17 g     prednisoLONE acetate 1 % Susp  Commonly known as: Pred Forte   Administer 1 Drop into affected eye(s) 2 times a day.  Dose: 1 Drop            STOP taking these medications      nitrofurantoin 100 MG Caps  Commonly known as: Macrobid              Allergies  Allergies   Allergen Reactions    Amitriptyline     Ciprofloxacin     Codeine      Tolerated morphine (2019)    Hydralazine      Face swelling    Nitrofurantoin Unspecified     Swelling, rash and pain     Other Drug      Pt states she is allergic to many antibiotics but she does not remember what they are.    Oxycodone Swelling    Pcn [Penicillins]     Potassium Shortness of Breath     \"I feel like I can't breath\".  Has tolerate IV KCl in past    Xanax " [Alprazolam]     Zoloft     Zyrtec [Cetirizine]        DIET  Orders Placed This Encounter   Procedures    Diet Order Diet: Low Fiber(GI Soft)     Standing Status:   Standing     Number of Occurrences:   1     Order Specific Question:   Diet:     Answer:   Low Fiber(GI Soft) [2]       ACTIVITY  As tolerated.  Weight bearing as tolerated    CONSULTATIONS  None    PROCEDURES  None    LABORATORY  Lab Results   Component Value Date    SODIUM 140 07/29/2024    POTASSIUM 4.4 07/29/2024    CHLORIDE 107 07/29/2024    CO2 23 07/29/2024    GLUCOSE 109 (H) 07/29/2024    BUN 8 07/29/2024    CREATININE 0.71 07/29/2024    CREATININE 0.9 11/19/2008        Lab Results   Component Value Date    WBC 7.2 07/29/2024    HEMOGLOBIN 11.9 (L) 07/29/2024    HEMATOCRIT 37.4 07/29/2024    PLATELETCT 232 07/29/2024      US-RUQ   Final Result      No acute process      CT-ABDOMEN-PELVIS WITH   Final Result      1.  Wall thickening of distal stomach suggesting gastritis.  No evidence of perforation.   2.  No bowel obstruction.   3.  Colonic diverticulosis without evidence for diverticulitis.   4.  Fatty infiltration of liver.      DX-CHEST-PORTABLE (1 VIEW)   Final Result      1.  Persistent hypoinflation with marked elevation of RIGHT hemidiaphragm.   2.  No pneumonia or overt pulmonary edema.   3.  Stable cardiac likely.         Total time of the discharge process exceeds 45 minutes.

## 2024-07-29 NOTE — PROGRESS NOTES
Received bedside report from previous RN. Patient is alert and oriented x4. On saline lock no signs of phlebitis and infiltration at this time. On RA. Fall precautions in place and call light within reach. All other needs met at this time.

## 2024-07-29 NOTE — PROGRESS NOTES
"The patient is upset and agitated because she wanted to receive morphine and prednisone acetate ophthalmic suspension immediately. Per patient \"why does it take so long and why you can't  left my eye drops at bedside.\"  Health education was provided to pt regarding the timing of medications administration, as well as the  combination of pain meds and potential side effects.   "

## 2024-07-29 NOTE — PROGRESS NOTES
Pt's PIV infiltrated and removed. Refused reinsertion at this time and requesting ultrasound guided PIV insertion.

## 2024-07-29 NOTE — DISCHARGE PLANNING
ATTN: Case Management  RE: Referral for Home Health    As of 7/29/2024, we have accepted the Home Health referral for the patient listed above.    A [facility] clinician will be out to see the patient within 48 hours. If you have any questions or concerns regarding the patient's transition to Home Health, please do not hesitate to contact us at [phone].      We look forward to collaborating with you,  [facility] Home Health Team

## 2024-07-31 NOTE — DOCUMENTATION QUERY
[facility]                                                                       Query Response Note      PATIENT:               JOSE DEL CID  ACCT #:                  [id number]  MRN:                     [id number]  :                      1940  ADMIT DATE:       2024 5:38 PM  DISCH DATE:        2024 11:30 AM  RESPONDING  PROVIDER #:        [id number]           QUERY TEXT:    Please provide additional clinical indicators supportive of the documented diagnosis of acute liver failure without hepatic coma based on the clinical indicators documented.    The patient's Clinical Indicators include:  Platelet since arrival: 271 -> 202 -> 232  AST/ALT/Alk Phos/Bilirubin since arrival: /100/0.6 -> 666/401/217/1.9 -> 231/270/194/0.4     CTAP - Liver: Diffuse low-attenuation   US RUQ - No focal liver lesion or biliary duct dilation    DC Summary: acute transaminitis...resolving...Active problems: Acute liver failure without hepatic coma    Treatment: Repeat labs and monitoring; CTAP; US RUQ    Risk factors: Transaminitis    Thank you,  Inga Randall BSN  Clinical   Connect via Tagwhat  Options provided:   -- Condition of acute liver failure without hepatic coma exists, (please document additional clinical indicators)   -- Condition of acute liver failure without hepatic coma does not exist, only transaminitis exists, and amended documentation provided in the medical record   -- Other explanation, (please specify other explanation)      Query created by: Inga Randall on 2024 5:33 PM    RESPONSE TEXT:    Condition of acute liver failure without hepatic coma exists -          Electronically signed by:  DIOR JOHANSEN MD 2024 3:32 PM

## 2024-08-01 LAB
BACTERIA BLD CULT: NORMAL
BACTERIA BLD CULT: NORMAL
SIGNIFICANT IND 70042: NORMAL
SIGNIFICANT IND 70042: NORMAL
SITE SITE: NORMAL
SITE SITE: NORMAL
SOURCE SOURCE: NORMAL
SOURCE SOURCE: NORMAL

## 2024-10-14 ENCOUNTER — PATIENT MESSAGE (OUTPATIENT)
Dept: HEALTH INFORMATION MANAGEMENT | Facility: OTHER | Age: 84
End: 2024-10-14

## 2024-10-23 ENCOUNTER — PATIENT OUTREACH (OUTPATIENT)
Dept: HEALTH INFORMATION MANAGEMENT | Facility: OTHER | Age: 84
End: 2024-10-23
Payer: MEDICARE

## 2024-11-04 ENCOUNTER — PATIENT OUTREACH (OUTPATIENT)
Dept: HEALTH INFORMATION MANAGEMENT | Facility: OTHER | Age: 84
End: 2024-11-04
Payer: MEDICARE

## 2024-11-04 NOTE — PROGRESS NOTES
Outpatient Social Work Follow-Up    Synopsis: ALAINA attempted to call Luz Maria on 10/23/24. There was no answer and SW was able to leave a voicemail with name and number requesting a call back in Maori.    @[phone]  SW has not received a call back from Luz Maria. ALAINA attempted to contact Luz Maria a second time on the home phone number available on file.   There was no answer. ALIANA was able to leave a voicemail with name and number requesting a call back in Maori.     Plan: ALAINA to await for a call back from veriCAR. SW to attempt to contact at another time.     Next Follow-Up: 11/13/24

## 2024-11-05 ENCOUNTER — PATIENT OUTREACH (OUTPATIENT)
Dept: HEALTH INFORMATION MANAGEMENT | Facility: OTHER | Age: 84
End: 2024-11-05
Payer: MEDICARE

## 2024-11-05 SDOH — ECONOMIC STABILITY: INCOME INSECURITY: IN THE LAST 12 MONTHS, WAS THERE A TIME WHEN YOU WERE NOT ABLE TO PAY THE MORTGAGE OR RENT ON TIME?: NO

## 2024-11-05 SDOH — ECONOMIC STABILITY: FOOD INSECURITY: WITHIN THE PAST 12 MONTHS, THE FOOD YOU BOUGHT JUST DIDN'T LAST AND YOU DIDN'T HAVE MONEY TO GET MORE.: NEVER TRUE

## 2024-11-05 SDOH — ECONOMIC STABILITY: TRANSPORTATION INSECURITY
IN THE PAST 12 MONTHS, HAS LACK OF TRANSPORTATION KEPT YOU FROM MEETINGS, WORK, OR FROM GETTING THINGS NEEDED FOR DAILY LIVING?: YES

## 2024-11-05 SDOH — ECONOMIC STABILITY: FOOD INSECURITY: WITHIN THE PAST 12 MONTHS, YOU WORRIED THAT YOUR FOOD WOULD RUN OUT BEFORE YOU GOT MONEY TO BUY MORE.: NEVER TRUE

## 2024-11-05 ASSESSMENT — SOCIAL DETERMINANTS OF HEALTH (SDOH)
HOW OFTEN DO YOU ATTENT MEETINGS OF THE CLUB OR ORGANIZATION YOU BELONG TO?: NEVER
IN A TYPICAL WEEK, HOW MANY TIMES DO YOU TALK ON THE PHONE WITH FAMILY, FRIENDS, OR NEIGHBORS?: THREE TIMES A WEEK
DO YOU BELONG TO ANY CLUBS OR ORGANIZATIONS SUCH AS CHURCH GROUPS UNIONS, FRATERNAL OR ATHLETIC GROUPS, OR SCHOOL GROUPS?: NO
DO YOU BELONG TO ANY CLUBS OR ORGANIZATIONS SUCH AS CHURCH GROUPS UNIONS, FRATERNAL OR ATHLETIC GROUPS, OR SCHOOL GROUPS?: NO
IN A TYPICAL WEEK, HOW MANY TIMES DO YOU TALK ON THE PHONE WITH FAMILY, FRIENDS, OR NEIGHBORS?: TWICE A WEEK
IN THE PAST 12 MONTHS, HAS THE ELECTRIC, GAS, OIL, OR WATER COMPANY THREATENED TO SHUT OFF SERVICE IN YOUR HOME?: NO
HOW OFTEN DO YOU GET TOGETHER WITH FRIENDS OR RELATIVES?: TWICE A WEEK
HOW HARD IS IT FOR YOU TO PAY FOR THE VERY BASICS LIKE FOOD, HOUSING, MEDICAL CARE, AND HEATING?: NOT HARD AT ALL
HOW OFTEN DO YOU ATTEND CHURCH OR RELIGIOUS SERVICES?: NEVER
HOW OFTEN DO YOU GET TOGETHER WITH FRIENDS OR RELATIVES?: TWICE A WEEK
HOW OFTEN DO YOU ATTENT MEETINGS OF THE CLUB OR ORGANIZATION YOU BELONG TO?: NEVER
HOW OFTEN DO YOU ATTEND CHURCH OR RELIGIOUS SERVICES?: NEVER

## 2024-11-05 ASSESSMENT — PATIENT HEALTH QUESTIONNAIRE - PHQ9: CLINICAL INTERPRETATION OF PHQ2 SCORE: 0

## 2024-11-05 NOTE — PROGRESS NOTES
Social Work Assessment  Community Care Management    Synopsis: CCM referral received by [facility] [place] BOLAÑOS-   Bill from [facility], unable to pay in full at this time  Bill from [facility], unable to pay in full at this time  Received  from CMS regarding potential eligibility for grocery assistance, not sure how to proceed  Unable to use a credit card she received from [facility], she believes she was supposed to have $25 available  Interested in family caregiver payment program offered through CMS  ALAINA attempted to call Luz Maria on 10/23/24 and 11/4/24 and was not able to speak to Luz Maria.   @[phone]  ALAINA attempted to call Luz Maria on the mobile number available on file. ALAINA was able to speak to Luz Maria. Luz Maria provided the information for the following assessment.   Patient’s Perception of Needs: Luz Maria informed ALAINA that she was able to pay the bills that she had mentioned to [facility] [place] BOLA[place]OS. Luz Maria stated to have seen a commercial that stated that individuals receiving Medicare could be eligible for assistance for homemaker services, grocery assistance, and transportation. Luz Maria stated to have called the number provided on the commercial [phone]. However, they asked her for her social security number and she did not feel comfortable providing it. Luz Maria would like to continue exploring these services.   Transportation  Luz Maria reported to sometimes receive transportation from her family. They all have their own responsibilities and it can be difficult for Luz Maria's family to provide transportation. Luz Maria has used the [employer] benefits and stated to have had issues with it in the past and has used up her ride limit. Luz Maria also stated to have been connected to [employer] and there were a few times that transportation did not show up to pick her up. Luz Maria expressed concerns about [employer] benefits and using [employer].   Medicare  Luz Maria stated to have concerns about [employer] and is currently looking  for assistance to explore other insurance options. Luz Maria requested that SW look up insurance plans and let Luz Maria know which ones are the best ones. SW explained that SW could provide information for community resources that could assist her with navigating the different plans and finding the plan that Luz Maria believes is best for her.   [facility]   Luz Maria reported to require assistance with groceries and homemaker services. SW asked Luz Maria if Luz Maria would be interested in getting connected to MOW and have meals delivered to her home. Luz Maria stated to already buy frozen meals and would be open to exploring MOW further. SW explained that SW could submit a referral to [facility] to be assessed to get connected to MOW and homemaker services. Luz Maria was agreeable to SW placing the referral.   11/6/24 @0921  SW submitted referral for [facility] programs- MOW and homemaker services.       General Care Management - Support  Support System: Yes  Living Arrangement: Alone  Caodaism, spiritual, or cultural beliefs that we should be aware of? No  Advanced Care Planning? No  Difficulty keeping appointments:No  General Care Management - Home Environment/Safety/DME  Type of Residence: Home with stairs  Bed or wheelchair confined: No  Equipment used at home: Cane  Transportation means: Family  Difficulty obtaining medications: No  ADL Assessment  Bathing: independent  Dressing: independent  Grooming: independent  Toileting: independent  Climbing:a flight of stairs needs assistance  IADL Assessment   Shopping: needs assistance  Cooking: independent  Managing Medications: independent  Using the phone and looking up numbers: independent  Driving or using public transportation: needs assistance  Managing finances: independent    Social Drivers of Health  Financial Resource Strain: Low Risk  (11/5/2024)    Overall Financial Resource Strain (CARDIA)     Difficulty of Paying Living  Expenses: Not hard at all     Housing Stability: Low Risk  (11/5/2024)    Housing Stability Vital Sign     Unable to Pay for Housing in the Last Year: No     Number of Times Moved in the Last Year: 1     Homeless in the Last Year: No     Transportation Needs: Unmet Transportation Needs (11/5/2024)    PRAPARE - Transportation     Lack of Transportation (Medical): No     Lack of Transportation (Non-Medical): Yes     Food Insecurity: No Food Insecurity (11/5/2024)    Hunger Vital Sign     Worried About Running Out of Food in the Last Year: Never true     Ran Out of Food in the Last Year: Never true     Social Connections: Socially Isolated (11/5/2024)    Social Connection and Isolation Panel [NHANES]     Frequency of Communication with Friends and Family: Twice a week     Frequency of Social Gatherings with Friends and Family: Twice a week     Attends Jew Services: Never     Active Member of Clubs or Organizations: No     Attends Club or Organization Meetings: Never     Marital Status:        Behavioral and Mental Health Review   Depression Screening    Little interest or pleasure in doing things?  0 - not at all  Feeling down, depressed , or hopeless? 0 - not at all  Patient Health Questionnaire Score: 0    If depressive symptoms identified deferred to follow up visit unless specifically addressed in assesment and plan.    Interpretation of PHQ-9 Total Score   Score Severity   1-4 Minimal Depression   5-9 Mild Depression   10-14 Moderate Depression   15-19 Moderately Severe Depression   20-27 Severe Depression    Mental or Behavioral Health Functioning: None reported.   Plan: Luz Maria to await to hear from [facility] for MOW and homemaker program.   ALAINA mailing out information for transportation- N4 and Access to Healthcare non-emergent transportation, Medicare- Access to HealthCare (MAP) and Medicare Store, ADSD OCL Program application, Local homemaker list, Advanced Directive in Cook Islander  and [facility] ANABEL strange.     Social Work Care Plan   Luz Maria Saavedra’s  Goal:  Luz Maria to work closely with [facility]- MOW and homemaker program, review the resources provided by SW and identify any questions. SW to assist as needed  Barriers:  Primarily Frisian speaking, lives alone, lack of support system, transportation  Interventions: Task based.   Start Date: 11/6/24  Anticipated Goal Achievement Date:  11/20/24  Next Scheduled patient outreach:  11/20/24  Social Work Care Coordinator:  Isabell Catherine   Community Care Management:  [phone]

## 2024-12-26 ENCOUNTER — PATIENT OUTREACH (OUTPATIENT)
Dept: HEALTH INFORMATION MANAGEMENT | Facility: OTHER | Age: 84
End: 2024-12-26
Payer: MEDICARE

## 2024-12-26 NOTE — PROGRESS NOTES
Outpatient Social Work Follow-Up    Synopsis: Luz Maria stated to have seen a commercial that stated that individuals receiving Medicare could be eligible for assistance for homemaker services, grocery assistance, and transportation. Luz Maria stated to have called the number provided on the commercial [phone]. However, they asked her for her social security number and she did not feel comfortable providing it. Luz Maria would like to continue exploring these services. ALAINA called the number provided and identified that the number is for [facility]. ALAINA called and confirmed that [employer] is a Medicaid program and not Medicare.     On 11/5/24 ALAINA placed a referral for the [facility] [place]OW and homemaker program for Luz Maria.    ALAINA mailed out information for transportation- N4 and Access to Healthcare non-emergent transportation, Medicare- Access to HealthCare (MAP) and Medicare Store, ADSD OCL Program application, Local homemaker list, Advanced Directive in [place]ammarinese and [facility] AD workshop flmiriam.     @[phone]  ALAINA attempted to call Luz Maria on the mobile number available on file. There was no answer. ALAINA was able to leave a voicemail with name and number requesting a call back.     Plan: ALAINA to await for a call back from Luz Maria. If a response is not received ALAINA will close CCM referral.     Next Follow-Up: Close referral- 1/3/24

## 2025-01-03 ENCOUNTER — PATIENT OUTREACH (OUTPATIENT)
Dept: HEALTH INFORMATION MANAGEMENT | Facility: OTHER | Age: 85
End: 2025-01-03
Payer: MEDICARE

## 2025-01-03 NOTE — PROGRESS NOTES
Social Work Case Closure    Initial Referral Request: CCM referral received by CCM RNModesto HENRY-   Bill from [facility], unable to pay in full at this time  Bill from [facility], unable to pay in full at this time  Received  from CMS regarding potential eligibility for grocery assistance, not sure how to proceed  Unable to use a credit card she received from [facility], she believes she was supposed to have $25 available  Interested in family caregiver payment program offered through CMS    Identified Needs During Assessment: Luz Maria informed ALAINA that she was able to pay the bills that she had mentioned to [facility] [place] BOLAÑOS. Luz Maria stated to have seen a commercial that stated that individuals receiving Medicare could be eligible for assistance for homemaker services, grocery assistance, and transportation. Luz Maria stated to have called the number provided on the commercial [phone]. However, they asked her for her social security number and she did not feel comfortable providing it. Luz Maria would like to continue exploring these services.     Outcome & Synopsis: Luz Maria could not remember the name of the program she was interested in applying for. Luz Maria provided the number [phone].   ALAINA called the number provided by Luz Maria and identified that it was for [facility]. These services are for individuals that have Medicaid FFS and for family members to become a paid caregiver.     ALAINA placed a referral to [facility] for MOW and homemaker program for Luz Maria and mailed out information for transportation- N4 and Access to Healthcare non-emergent transportation, Medicare- Access to HealthCare (MAP) and Medicare Store, ADSD OCL Program application, Local homemaker list, Advanced Directive in Turkish and [facility] AD mika strange.        ALAINA attempted to contact Luz Maria on the mobile number available on file on 12/26/24. There was no answer. ALAINA was able to leave a voicemail with  name and number requesting a call back. ALAINA did not receive a response from Luz Maria.   ALAINA will be closing CCM referral and will no longer continue to follow at this time.

## 2025-03-07 ENCOUNTER — HOSPITAL ENCOUNTER (OUTPATIENT)
Dept: LAB | Facility: MEDICAL CENTER | Age: 85
End: 2025-03-07
Attending: FAMILY MEDICINE
Payer: MEDICARE

## 2025-03-07 LAB
25(OH)D3 SERPL-MCNC: 30 NG/ML (ref 30–100)
ALBUMIN SERPL BCP-MCNC: 4 G/DL (ref 3.2–4.9)
ALBUMIN/GLOB SERPL: 1.1 G/DL
ALP SERPL-CCNC: 105 U/L (ref 30–99)
ALT SERPL-CCNC: 9 U/L (ref 2–50)
ANION GAP SERPL CALC-SCNC: 12 MMOL/L (ref 7–16)
AST SERPL-CCNC: 17 U/L (ref 12–45)
BILIRUB SERPL-MCNC: 0.6 MG/DL (ref 0.1–1.5)
BUN SERPL-MCNC: 12 MG/DL (ref 8–22)
CALCIUM ALBUM COR SERPL-MCNC: 9.4 MG/DL (ref 8.5–10.5)
CALCIUM SERPL-MCNC: 9.4 MG/DL (ref 8.5–10.5)
CHLORIDE SERPL-SCNC: 104 MMOL/L (ref 96–112)
CHOLEST SERPL-MCNC: 153 MG/DL (ref 100–199)
CO2 SERPL-SCNC: 23 MMOL/L (ref 20–33)
CREAT SERPL-MCNC: 0.83 MG/DL (ref 0.5–1.4)
EST. AVERAGE GLUCOSE BLD GHB EST-MCNC: 120 MG/DL
GFR SERPLBLD CREATININE-BSD FMLA CKD-EPI: 68 ML/MIN/1.73 M 2
GLOBULIN SER CALC-MCNC: 3.6 G/DL (ref 1.9–3.5)
GLUCOSE SERPL-MCNC: 112 MG/DL (ref 65–99)
HBA1C MFR BLD: 5.8 % (ref 4–5.6)
HDLC SERPL-MCNC: 55 MG/DL
LDLC SERPL CALC-MCNC: 70 MG/DL
POTASSIUM SERPL-SCNC: 4.3 MMOL/L (ref 3.6–5.5)
PROT SERPL-MCNC: 7.6 G/DL (ref 6–8.2)
SODIUM SERPL-SCNC: 139 MMOL/L (ref 135–145)
TRIGL SERPL-MCNC: 140 MG/DL (ref 0–149)

## 2025-03-07 PROCEDURE — 87186 SC STD MICRODIL/AGAR DIL: CPT

## 2025-03-07 PROCEDURE — 87077 CULTURE AEROBIC IDENTIFY: CPT

## 2025-03-07 PROCEDURE — 87086 URINE CULTURE/COLONY COUNT: CPT

## 2025-03-07 PROCEDURE — 36415 COLL VENOUS BLD VENIPUNCTURE: CPT

## 2025-03-07 PROCEDURE — 80053 COMPREHEN METABOLIC PANEL: CPT

## 2025-03-07 PROCEDURE — 82306 VITAMIN D 25 HYDROXY: CPT

## 2025-03-07 PROCEDURE — 83036 HEMOGLOBIN GLYCOSYLATED A1C: CPT

## 2025-03-07 PROCEDURE — 80061 LIPID PANEL: CPT

## 2025-03-10 LAB
BACTERIA UR CULT: ABNORMAL
BACTERIA UR CULT: ABNORMAL
SIGNIFICANT IND 70042: ABNORMAL
SITE SITE: ABNORMAL
SOURCE SOURCE: ABNORMAL

## 2025-04-09 ENCOUNTER — HOSPITAL ENCOUNTER (OUTPATIENT)
Dept: LAB | Facility: MEDICAL CENTER | Age: 85
End: 2025-04-09
Attending: FAMILY MEDICINE
Payer: MEDICARE

## 2025-04-09 LAB
APPEARANCE UR: CLEAR
BACTERIA #/AREA URNS HPF: ABNORMAL /HPF
BILIRUB UR QL STRIP.AUTO: NEGATIVE
CASTS URNS QL MICRO: ABNORMAL /LPF (ref 0–2)
COLOR UR: YELLOW
EPITHELIAL CELLS 1715: ABNORMAL /HPF (ref 0–5)
GLUCOSE UR STRIP.AUTO-MCNC: NEGATIVE MG/DL
KETONES UR STRIP.AUTO-MCNC: NEGATIVE MG/DL
LEUKOCYTE ESTERASE UR QL STRIP.AUTO: ABNORMAL
MICRO URNS: ABNORMAL
NITRITE UR QL STRIP.AUTO: NEGATIVE
PH UR STRIP.AUTO: 5.5 [PH] (ref 5–8)
PROT UR QL STRIP: NEGATIVE MG/DL
RBC # URNS HPF: ABNORMAL /HPF (ref 0–2)
RBC UR QL AUTO: ABNORMAL
SP GR UR STRIP.AUTO: 1.02
UROBILINOGEN UR STRIP.AUTO-MCNC: 0.2 EU/DL
WBC #/AREA URNS HPF: ABNORMAL /HPF

## 2025-04-09 PROCEDURE — 87086 URINE CULTURE/COLONY COUNT: CPT

## 2025-04-09 PROCEDURE — 81001 URINALYSIS AUTO W/SCOPE: CPT

## 2025-04-11 LAB
BACTERIA UR CULT: NORMAL
SIGNIFICANT IND 70042: NORMAL
SITE SITE: NORMAL
SOURCE SOURCE: NORMAL

## 2025-06-25 NOTE — OP THERAPY EVALUATION
Outpatient Physical Therapy  INITIAL EVALUATION    [facility] Physical Therapy [place] [address] [facility], [address]  [place] [address]  Phone:  [phone]    Date of Evaluation: 06/26/2025    Patient: Luz Maria Saavedra  YOB: 1940  MRN: [id number]     Referring Provider: William Coleman M.D.  [address] [facility]   [address]  [place] [address]   Referring Diagnosis Low back pain, unspecified [M54.50]     Time Calculation  Start time: 0145  Stop time: 0230 Time Calculation (min): 45 minutes         Chief Complaint: No chief complaint on file.    Visit Diagnoses     ICD-10-CM   1. Low back pain, unspecified back pain laterality, unspecified chronicity, unspecified whether sciatica present  M54.50   2. Chronic pain of both knees  M25.561    M25.562    G89.29       Date of onset of impairment: 6/26/2020    Subjective:   History of Present Illness:     Mechanism of injury:  CMHx: Patient indicates that she has had fibromyalgia since 2007. She notes she did some PT in the past with mixed results and her rheumatologist wanted her to do PT consistently. She stopped due to the effort to get to the clinic however and has had a worsening of her issues when she was consistent here several years ago. She also notes eye issues she has needed cortisone for that she feels made her sick and are contributing to her pain as well as all kinds of other side effects. She feels as though her doctors should have discussed this risk with her more and has a distrust here for medications with her MDs now. She feels with all of these factors she has had a lot harder time moving around. She indicates that she has knee pain, stiffness in her neck, mid back, and eyes. She iniitally just recently statted at a PT clinic but didn't like the clinic as it was open spacesa dn didn't like having everyone right there. She feels she can't walk normally and her swelling. She thinks her fibromyalgia and her being put on corisone for  "several months contributed to her overall issues. She feels that some exercises in PT are helpful in the past in machines, some massage if it wasn't too much, and that it is important to not have someone \"hurt\" her during PT. She was a lot more comfortable with a PT that was more gently to her. She is requesting tos witch toa  female PT because she feels more comfortable working with the same gender.     Pain Behavior  Sxs: B knees (L>R), neck, mid back, lower back. Gets an electrical spreading or burning at times; overall 10/10 pain reported today    Aggs: walking (Esepcailly knees), getting off the bus on steps (especially knees), getting out of bed, chores    Eas: massage (*if not painful), heat, tub submersion which is helpful, wearing a belt on her lumbar spine helps some    Sleep: most nights doesn't sleep well    Yellow flags: stressed per patient at times with her pain but notes that she is not depressed    Imaging: none of note recently    PMHx: L shoulder and L foot surgery. Abdominal surgeries.     Profession/Recreation: pushes herself to do chores/cook. [place] trees in backyard, watches TV. Does Tub submersion daily which was helpful. Worked most of her life    Goals: walk better, get up steps on the bus easier, have enough energy and ability to go out in community more such as in a casino                             Past Medical History[1]  Past Surgical History[2]  Social History     Tobacco Use    Smoking status: Never    Smokeless tobacco: Never   Substance Use Topics    Alcohol use: No     Social Hx - Family and Occupational[3]    Objective     General Comments     Spine Comments   Olivia's sign flexion    5 times sit to stand screen 24.55 seconds from standard chair and needs use of hands and compensatory movement to complete    Can't stand without hands from normal chair    Gait: fairly normalized with SPC; stiff trunk         Therapeutic Exercises (CPT 91193):     1. UPOC 8/26/25 SCP      " Therapeutic Exercise Summary: Home Exercise Program Created and Reviewed with patient    STS x5 throughout the day  Breathing x5' when overwhelmed with pain.        Time-based treatments/modalities:    Physical Therapy Timed Treatment Charges  Therapeutic exercise minutes (CPT 69311): 8 minutes      Assessment, Response and Plan:   Impairments: impaired functional mobility, lacks appropriate home exercise program and pain with function    Assessment details:  PT finds s/sx consistent with LBP with motor coordination/power deficits. This is evident with subjective pattern/location of pain with sxs during arc of motion along with contributing factors of decreased trunk muscle function as well as weakness with functional tasks such as a sit to stand. She also has significant spread of pain, catastrophizing, and perseveration behaviors which may significantly impact her prognosis. She can benefit from skilled PT care to address the above findings to improve her community ambulation, functional strength, and improve her current painful movement and motor control strategies.    Barriers to therapy:  Comorbidities  Prognosis: fair    Goals:   Short Term Goals:   -pt meets [facility] for RMQ  -pt meets [facility] for 5x sit to stand screen to improve her functional strength closer to her peers and improve her community and home mobility  -pt with ability to get on/off transport bus with moderate difficulty at most >75% of trips  -pt without jarrell's sign and reaches down 50% to pick things up without fear in order to improve IADLS/chores/etc    Short term goal time span:  2-4 weeks      Long Term Goals:    -pt meets [facility] for RMQ  -pt meets [facility] for 5x sit to stand screen to improve her functional strength closer to her peers and improve her community and home mobility  -pt with ability to get on/off transport bus with moderate difficulty at most >90% of trips  -pt without jarrell's sign and reaches down 75% to pick things up without fear  in order to improve IADLS/chores/etc  -pt picks up objects 5-10lbs without fear of movement and ability to do so x5 reps with good form to improve trunk strength for transitional postures, chores, etc    Long term goal time span:  6-8 weeks    Plan:   Therapy options:  Physical therapy treatment to continue  Planned therapy interventions:  E Stim Attended (CPT 85592), E Stim Unattended (CPT 80338), Hot or Cold Pack Therapy (CPT 34848), Manual Therapy (CPT 07581), Neuromuscular Re-education (CPT 18971), Therapeutic Exercise (CPT 81268) and Therapeutic Activities (CPT 59976)  Frequency:  2x week  Duration in weeks:  8  Duration in visits:  16  Discussed with:  Patient      Functional Assessment Used        Referring provider co-signature:  I have reviewed this plan of care and my co-signature certifies the need for services.    Certification Period: 06/26/2025 to  08/28/25    Physician Signature: ________________________________ Date: ______________                      [1]   Past Medical History:  Diagnosis Date    Anxiety and depression     Hypertension     Renal disorder     Stroke (HCC)    [2]   Past Surgical History:  Procedure Laterality Date    EXPLORATORY LAPAROTOMY  8/4/2012    Performed by KELI RAMIREZ at SURGERY [facility] ORS    CATARACT PHACO WITH IOL  2/10/2009    [facility]formed [facility]    CATARACT PHACO WITH IOL  1/27/2009    Performed by ZORAIDA LÓPEZ at SURGERY SAME DAY [facility] ORS    NEUROMA EXCISION  11/21/08    Performed by MARCUS RIGGINS at SURGERY SAME DAY [facility] ORS    MIMI BY LAPAROSCOPY      CHOLECYSTECTOMY      HIATAL HERNIA REPAIR     [3]   Family and Occupational History  Socioeconomic History    Marital status:      Spouse name: Not on file    Number of children: Not on file    Years of education: Not on file    Highest education level: Not on file   Occupational History    Not on file

## 2025-06-26 ENCOUNTER — PHYSICAL THERAPY (OUTPATIENT)
Dept: PHYSICAL THERAPY | Facility: REHABILITATION | Age: 85
End: 2025-06-26
Attending: FAMILY MEDICINE
Payer: MEDICARE

## 2025-06-26 DIAGNOSIS — G89.29 CHRONIC PAIN OF BOTH KNEES: ICD-10-CM

## 2025-06-26 DIAGNOSIS — M25.562 CHRONIC PAIN OF BOTH KNEES: ICD-10-CM

## 2025-06-26 DIAGNOSIS — M25.561 CHRONIC PAIN OF BOTH KNEES: ICD-10-CM

## 2025-06-26 DIAGNOSIS — M54.50 LOW BACK PAIN, UNSPECIFIED BACK PAIN LATERALITY, UNSPECIFIED CHRONICITY, UNSPECIFIED WHETHER SCIATICA PRESENT: Primary | ICD-10-CM

## 2025-06-26 PROCEDURE — 97110 THERAPEUTIC EXERCISES: CPT

## 2025-06-26 PROCEDURE — 97163 PT EVAL HIGH COMPLEX 45 MIN: CPT

## 2025-07-03 ENCOUNTER — APPOINTMENT (OUTPATIENT)
Dept: PHYSICAL THERAPY | Facility: REHABILITATION | Age: 85
End: 2025-07-03
Attending: FAMILY MEDICINE
Payer: MEDICARE

## 2025-07-09 ENCOUNTER — PHYSICAL THERAPY (OUTPATIENT)
Dept: PHYSICAL THERAPY | Facility: REHABILITATION | Age: 85
End: 2025-07-09
Attending: FAMILY MEDICINE
Payer: MEDICARE

## 2025-07-09 DIAGNOSIS — M54.50 BILATERAL LOW BACK PAIN WITHOUT SCIATICA, UNSPECIFIED CHRONICITY: Primary | ICD-10-CM

## 2025-07-09 PROCEDURE — 97112 NEUROMUSCULAR REEDUCATION: CPT

## 2025-07-09 PROCEDURE — 97110 THERAPEUTIC EXERCISES: CPT

## 2025-07-09 NOTE — OP THERAPY DAILY TREATMENT
"  Outpatient Physical Therapy  DAILY TREATMENT     [facility] Outpatient Physical Therapy [place] [address] [facility], [address]  [place] [address]  Phone:  [phone]    Date: 07/09/2025    Patient: Luz Maria Saavedra  YOB: 1940  MRN: [id number]     Time Calculation    Start time: 1430  Stop time: 1515 Time Calculation (min): 45 minutes         Chief Complaint: No chief complaint on file.    Visit #: 2    SUBJECTIVE:  'I overdid it, so I have pain. I have FM: here is my diagram of pain\"  Pain area: bilat neck, shoulders, low back, hips knees  Pain level: baseline    OBJECTIVE:  Current objective measures:   Walks w/SPC minimal contact w/ground rather holding off ground.  Sit>stands: NT            Therapeutic Exercises (CPT 27780):     2. TvA march, 10    3. TvA bridge, 1. 5x5\" 2. w/legs on ball 8x5\"    4. Clam shell, l, r 10    5. hip abduction, l, r 10    6. piriformis stretch, l, r 30\"    7. shuttle, DLP 3x10 4C    Therapeutic Treatments and Modalities:     1. Neuromuscular Re-education (CPT 61513), L/S stab./positioning: home position, diaphramatic breaths/PLB,  recruiting TvA    Time-based treatments/modalities:    Physical Therapy Timed Treatment Charges  Neuromusc re-ed, balance, coor, post minutes (CPT 59774): 15 minutes  Therapeutic exercise minutes (CPT 66417): 30 minutes    ASSESSMENT:   Response to treatment: pt. Responds well to beginning Lx stabilization ex. Rec. Continue PT    PLAN/RECOMMENDATIONS:   Plan for treatment: therapy treatment to continue next visit.  Planned interventions for next visit: continue with current treatment.         "

## 2025-07-10 ENCOUNTER — APPOINTMENT (OUTPATIENT)
Dept: PHYSICAL THERAPY | Facility: REHABILITATION | Age: 85
End: 2025-07-10
Attending: FAMILY MEDICINE
Payer: MEDICARE

## 2025-07-17 ENCOUNTER — APPOINTMENT (OUTPATIENT)
Dept: PHYSICAL THERAPY | Facility: REHABILITATION | Age: 85
End: 2025-07-17
Attending: FAMILY MEDICINE
Payer: MEDICARE

## 2025-07-24 ENCOUNTER — APPOINTMENT (OUTPATIENT)
Dept: PHYSICAL THERAPY | Facility: REHABILITATION | Age: 85
End: 2025-07-24
Attending: FAMILY MEDICINE
Payer: MEDICARE

## 2025-07-31 ENCOUNTER — APPOINTMENT (OUTPATIENT)
Dept: PHYSICAL THERAPY | Facility: REHABILITATION | Age: 85
End: 2025-07-31
Attending: FAMILY MEDICINE
Payer: MEDICARE